# Patient Record
Sex: FEMALE | Race: WHITE | Employment: UNEMPLOYED
[De-identification: names, ages, dates, MRNs, and addresses within clinical notes are randomized per-mention and may not be internally consistent; named-entity substitution may affect disease eponyms.]

---

## 2017-05-17 ENCOUNTER — HOSPITAL ENCOUNTER (OUTPATIENT)
Dept: MAMMOGRAPHY | Facility: CLINIC | Age: 72
Discharge: HOME | End: 2017-05-17
Payer: MEDICARE

## 2017-05-17 DIAGNOSIS — Z12.31 VISIT FOR SCREENING MAMMOGRAM: ICD-10-CM

## 2017-05-17 PROCEDURE — G0202 SCR MAMMO BI INCL CAD: HCPCS

## 2018-06-11 ENCOUNTER — HOSPITAL ENCOUNTER (OUTPATIENT)
Dept: MAMMOGRAPHY | Facility: CLINIC | Age: 73
Discharge: HOME | End: 2018-06-13
Payer: MEDICARE

## 2018-06-11 DIAGNOSIS — Z12.39 SCREENING BREAST EXAMINATION: ICD-10-CM

## 2018-06-11 PROCEDURE — 77067 SCR MAMMO BI INCL CAD: CPT

## 2018-06-13 PROBLEM — R82.991 HYPOCITRATURIA: Status: ACTIVE | Noted: 2018-06-13

## 2020-02-11 ENCOUNTER — APPOINTMENT (RX ONLY)
Dept: URBAN - METROPOLITAN AREA CLINIC 116 | Facility: CLINIC | Age: 75
Setting detail: DERMATOLOGY
End: 2020-02-11

## 2020-02-11 DIAGNOSIS — D69.2 OTHER NONTHROMBOCYTOPENIC PURPURA: ICD-10-CM

## 2020-02-11 DIAGNOSIS — L30.9 DERMATITIS, UNSPECIFIED: ICD-10-CM

## 2020-02-11 DIAGNOSIS — L57.0 ACTINIC KERATOSIS: ICD-10-CM

## 2020-02-11 DIAGNOSIS — L29.89 OTHER PRURITUS: ICD-10-CM

## 2020-02-11 DIAGNOSIS — L85.3 XEROSIS CUTIS: ICD-10-CM

## 2020-02-11 PROBLEM — L29.8 OTHER PRURITUS: Status: ACTIVE | Noted: 2020-02-11

## 2020-02-11 PROCEDURE — 99203 OFFICE O/P NEW LOW 30 MIN: CPT

## 2020-02-11 PROCEDURE — ? PRESCRIPTION

## 2020-02-11 PROCEDURE — ? COUNSELING

## 2020-02-11 PROCEDURE — ? DEFER

## 2020-02-11 PROCEDURE — ? TREATMENT REGIMEN

## 2020-02-11 PROCEDURE — ? MEDICATION COUNSELING

## 2020-02-11 RX ORDER — NYSTATIN 100000 [USP'U]/G
1 POWDER TOPICAL TID
Qty: 1 | Refills: 0 | Status: ERX | COMMUNITY
Start: 2020-02-11

## 2020-02-11 RX ORDER — TRIAMCINOLONE ACETONIDE 1 MG/G
CREAM TOPICAL BID
Qty: 1 | Refills: 0 | Status: ERX | COMMUNITY
Start: 2020-02-11

## 2020-02-11 RX ORDER — EMOLLIENT COMBINATION NO.35
1 CREAM (GRAM) TOPICAL QD
Qty: 1 | Refills: 0 | Status: ERX | COMMUNITY
Start: 2020-02-11

## 2020-02-11 RX ORDER — GENTAMICIN SULFATE 1 MG/G
1 CREAM TOPICAL
Qty: 1 | Refills: 0 | Status: ERX | COMMUNITY
Start: 2020-02-11

## 2020-02-11 RX ADMIN — GENTAMICIN SULFATE 1: 1 CREAM TOPICAL at 00:00

## 2020-02-11 RX ADMIN — TRIAMCINOLONE ACETONIDE: 1 CREAM TOPICAL at 00:00

## 2020-02-11 RX ADMIN — NYSTATIN 1: 100000 POWDER TOPICAL at 00:00

## 2020-02-11 RX ADMIN — Medication 1: at 00:00

## 2020-02-11 ASSESSMENT — LOCATION DETAILED DESCRIPTION DERM
LOCATION DETAILED: LEFT ULNAR DORSAL HAND
LOCATION DETAILED: LEFT PROXIMAL DORSAL FOREARM
LOCATION DETAILED: RIGHT RADIAL DORSAL HAND
LOCATION DETAILED: LEFT RADIAL DORSAL HAND
LOCATION DETAILED: RIGHT PROXIMAL DORSAL FOREARM
LOCATION DETAILED: LEFT ANTERIOR PROXIMAL THIGH

## 2020-02-11 ASSESSMENT — LOCATION SIMPLE DESCRIPTION DERM
LOCATION SIMPLE: LEFT THIGH
LOCATION SIMPLE: LEFT FOREARM
LOCATION SIMPLE: LEFT HAND
LOCATION SIMPLE: RIGHT FOREARM
LOCATION SIMPLE: RIGHT HAND

## 2020-02-11 ASSESSMENT — LOCATION ZONE DERM
LOCATION ZONE: LEG
LOCATION ZONE: HAND
LOCATION ZONE: ARM

## 2020-02-11 NOTE — HPI: RASH
What Type Of Note Output Would You Prefer (Optional)?: Standard Output
Is The Patient Presenting As Previously Scheduled?: Yes
How Severe Is Your Rash?: mild
Is This A New Presentation, Or A Follow-Up?: Rash
Additional History: Patient states that she saw her PCP and they thought it was a fungus.  \\nPatient states the rash started after shegot sick and was prescribed a zpack and antibiotics

## 2020-02-11 NOTE — PROCEDURE: DEFER
Introduction Text (Please End With A Colon): The following procedure was deferred: Liquid Nitrogen
Reason To Defer Override: patient wishes to decline LN2 today
Detail Level: Simple

## 2020-02-11 NOTE — PROCEDURE: MEDICATION COUNSELING
Mirvaso Pregnancy And Lactation Text: This medication has not been assigned a Pregnancy Risk Category. It is unknown if the medication is excreted in breast milk.
Tetracycline Pregnancy And Lactation Text: This medication is Pregnancy Category D and not consider safe during pregnancy. It is also excreted in breast milk.
Otezla Pregnancy And Lactation Text: This medication is Pregnancy Category C and it isn't known if it is safe during pregnancy. It is unknown if it is excreted in breast milk.
Clindamycin Counseling: I counseled the patient regarding use of clindamycin as an antibiotic for prophylactic and/or therapeutic purposes. Clindamycin is active against numerous classes of bacteria, including skin bacteria. Side effects may include nausea, diarrhea, gastrointestinal upset, rash, hives, yeast infections, and in rare cases, colitis.
Zyclara Counseling:  I discussed with the patient the risks of imiquimod including but not limited to erythema, scaling, itching, weeping, crusting, and pain. Patient understands that the inflammatory response to imiquimod is variable from person to person and was educated regarded proper titration schedule. If flu-like symptoms develop, patient knows to discontinue the medication and contact us.
Prednisone Pregnancy And Lactation Text: This medication is Pregnancy Category C and it isn't know if it is safe during pregnancy. This medication is excreted in breast milk.
Finasteride Counseling:  I discussed with the patient the risks of use of finasteride including but not limited to decreased libido, decreased ejaculate volume, gynecomastia, and depression. Women should not handle medication. All of the patient's questions and concerns were addressed.
Valtrex Pregnancy And Lactation Text: this medication is Pregnancy Category B and is considered safe during pregnancy. This medication is not directly found in breast milk but it's metabolite acyclovir is present.
(4) no impairment
Azathioprine Counseling:  I discussed with the patient the risks of azathioprine including but not limited to myelosuppression, immunosuppression, hepatotoxicity, lymphoma, and infections. The patient understands that monitoring is required including baseline LFTs, Creatinine, possible TPMP genotyping and weekly CBCs for the first month and then every 2 weeks thereafter. The patient verbalized understanding of the proper use and possible adverse effects of azathioprine. All of the patient's questions and concerns were addressed.
5-Fu Counseling: 5-Fluorouracil Counseling:  I discussed with the patient the risks of 5-fluorouracil including but not limited to erythema, scaling, itching, weeping, crusting, and pain.
Include Pregnancy/Lactation Warning?: No
Dupixent Counseling: I discussed with the patient the risks of dupilumab including but not limited to eye infection and irritation, cold sores, injection site reactions, worsening of asthma, allergic reactions and increased risk of parasitic infection. Live vaccines should be avoided while taking dupilumab. Dupilumab will also interact with certain medications such as warfarin and cyclosporine. The patient understands that monitoring is required and they must alert us or the primary physician if symptoms of infection or other concerning signs are noted.
5-Fu Pregnancy And Lactation Text: This medication is Pregnancy Category X and contraindicated in pregnancy and in women who may become pregnant. It is unknown if this medication is excreted in breast milk.
Zyclara Pregnancy And Lactation Text: This medication is Pregnancy Category C. It is unknown if this medication is excreted in breast milk.
Picato Counseling:  I discussed with the patient the risks of Picato including but not limited to erythema, scaling, itching, weeping, crusting, and pain.
Simponi Pregnancy And Lactation Text: The risk during pregnancy and breastfeeding is uncertain with this medication.
Oxybutynin Counseling:  I discussed with the patient the risks of oxybutynin including but not limited to skin rash, drowsiness, dry mouth, difficulty urinating, and blurred vision.
Doxepin Pregnancy And Lactation Text: This medication is Pregnancy Category C and it isn't known if it is safe during pregnancy. It is also excreted in breast milk and breast feeding isn't recommended.
Clindamycin Pregnancy And Lactation Text: This medication can be used in pregnancy if certain situations. Clindamycin is also present in breast milk.
Azathioprine Pregnancy And Lactation Text: This medication is Pregnancy Category D and isn't considered safe during pregnancy. It is unknown if this medication is excreted in breast milk.
Skyrizi Counseling: I discussed with the patient the risks of risankizumab-rzaa including but not limited to immunosuppression, and serious infections. The patient understands that monitoring is required including a PPD at baseline and must alert us or the primary physician if symptoms of infection or other concerning signs are noted.
Dupixent Pregnancy And Lactation Text: This medication likely crosses the placenta but the risk for the fetus is uncertain. This medication is excreted in breast milk.
Finasteride Pregnancy And Lactation Text: This medication is absolutely contraindicated during pregnancy. It is unknown if it is excreted in breast milk.
Oxybutynin Pregnancy And Lactation Text: This medication is Pregnancy Category B and is considered safe during pregnancy. It is unknown if it is excreted in breast milk.
Hydroxyzine Counseling: Patient advised that the medication is sedating and not to drive a car after taking this medication. Patient informed of potential adverse effects including but not limited to dry mouth, urinary retention, and blurry vision. The patient verbalized understanding of the proper use and possible adverse effects of hydroxyzine. All of the patient's questions and concerns were addressed.
Fluconazole Counseling:  Patient counseled regarding adverse effects of fluconazole including but not limited to headache, diarrhea, nausea, upset stomach, liver function test abnormalities, taste disturbance, and stomach pain. There is a rare possibility of liver failure that can occur when taking fluconazole. The patient understands that monitoring of LFTs and kidney function test may be required, especially at baseline. The patient verbalized understanding of the proper use and possible adverse effects of fluconazole. All of the patient's questions and concerns were addressed.
Acitretin Counseling:  I discussed with the patient the risks of acitretin including but not limited to hair loss, dry lips/skin/eyes, liver damage, hyperlipidemia, depression/suicidal ideation, photosensitivity. Serious rare side effects can include but are not limited to pancreatitis, pseudotumor cerebri, bony changes, clot formation/stroke/heart attack. Patient understands that alcohol is contraindicated since it can result in liver toxicity and significantly prolong the elimination of the drug by many years.
Enbrel Counseling:  I discussed with the patient the risks of etanercept including but not limited to myelosuppression, immunosuppression, autoimmune hepatitis, demyelinating diseases, lymphoma, and infections. The patient understands that monitoring is required including a PPD at baseline and must alert us or the primary physician if symptoms of infection or other concerning signs are noted.
Gabapentin Counseling: I discussed with the patient the risks of gabapentin including but not limited to dizziness, somnolence, fatigue and ataxia.
Doxycycline Counseling:  Patient counseled regarding possible photosensitivity and increased risk for sunburn. Patient instructed to avoid sunlight, if possible. When exposed to sunlight, patients should wear protective clothing, sunglasses, and sunscreen. The patient was instructed to call the office immediately if the following severe adverse effects occur:  hearing changes, easy bruising/bleeding, severe headache, or vision changes. The patient verbalized understanding of the proper use and possible adverse effects of doxycycline. All of the patient's questions and concerns were addressed.
Drysol Counseling:  I discussed with the patient the risks of drysol/aluminum chloride including but not limited to skin rash, itching, irritation, burning.
Cellcept Counseling:  I discussed with the patient the risks of mycophenolate mofetil including but not limited to infection/immunosuppression, GI upset, hypokalemia, hypercholesterolemia, bone marrow suppression, lymphoproliferative disorders, malignancy, GI ulceration/bleed/perforation, colitis, interstitial lung disease, kidney failure, progressive multifocal leukoencephalopathy, and birth defects. The patient understands that monitoring is required including a baseline creatinine and regular CBC testing. In addition, patient must alert us immediately if symptoms of infection or other concerning signs are noted.
Protopic Counseling: Patient may experience a mild burning sensation during topical application. Protopic is not approved in children less than 3years of age. There have been case reports of hematologic and skin malignancies in patients using topical calcineurin inhibitors although causality is questionable.
Opioid Counseling: I discussed with the patient the potential side effects of opioids including but not limited to addiction, altered mental status, and depression. I stressed avoiding alcohol, benzodiazepines, muscle relaxants and sleep aids unless specifically okayed by a physician. The patient verbalized understanding of the proper use and possible adverse effects of opioids. All of the patient's questions and concerns were addressed. They were instructed to flush the remaining pills down the toilet if they did not need them for pain.
Fluconazole Pregnancy And Lactation Text: This medication is Pregnancy Category C and it isn't know if it is safe during pregnancy. It is also excreted in breast milk.
Propranolol Counseling:  I discussed with the patient the risks of propranolol including but not limited to low heart rate, low blood pressure, low blood sugar, restlessness and increased cold sensitivity. They should call the office if they experience any of these side effects.
Acitretin Pregnancy And Lactation Text: This medication is Pregnancy Category X and should not be given to women who are pregnant or may become pregnant in the future. This medication is excreted in breast milk.
Drysol Pregnancy And Lactation Text: This medication is considered safe during pregnancy and breast feeding.
Hydroxyzine Pregnancy And Lactation Text: This medication is not safe during pregnancy and should not be taken. It is also excreted in breast milk and breast feeding isn't recommended.
Doxycycline Pregnancy And Lactation Text: This medication is Pregnancy Category D and not consider safe during pregnancy. It is also excreted in breast milk but is considered safe for shorter treatment courses.
Enbrel Pregnancy And Lactation Text: This medication is Pregnancy Category B and is considered safe during pregnancy. It is unknown if this medication is excreted in breast milk.
Gabapentin Pregnancy And Lactation Text: This medication is Pregnancy Category C and isn't considered safe during pregnancy. It is excreted in breast milk.
Stelara Counseling:  I discussed with the patient the risks of ustekinumab including but not limited to immunosuppression, malignancy, posterior leukoencephalopathy syndrome, and serious infections. The patient understands that monitoring is required including a PPD at baseline and must alert us or the primary physician if symptoms of infection or other concerning signs are noted.
Griseofulvin Counseling:  I discussed with the patient the risks of griseofulvin including but not limited to photosensitivity, cytopenia, liver damage, nausea/vomiting and severe allergy. The patient understands that this medication is best absorbed when taken with a fatty meal (e.g., ice cream or french fries).
Opioid Pregnancy And Lactation Text: These medications can lead to premature delivery and should be avoided during pregnancy. These medications are also present in breast milk in small amounts.
Bexarotene Counseling:  I discussed with the patient the risks of bexarotene including but not limited to hair loss, dry lips/skin/eyes, liver abnormalities, hyperlipidemia, pancreatitis, depression/suicidal ideation, photosensitivity, drug rash/allergic reactions, hypothyroidism, anemia, leukopenia, infection, cataracts, and teratogenicity. Patient understands that they will need regular blood tests to check lipid profile, liver function tests, white blood cell count, thyroid function tests and pregnancy test if applicable.
Humira Counseling:  I discussed with the patient the risks of adalimumab including but not limited to myelosuppression, immunosuppression, autoimmune hepatitis, demyelinating diseases, lymphoma, and serious infections. The patient understands that monitoring is required including a PPD at baseline and must alert us or the primary physician if symptoms of infection or other concerning signs are noted.
Erythromycin Counseling:  I discussed with the patient the risks of erythromycin including but not limited to GI upset, allergic reaction, drug rash, diarrhea, increase in liver enzymes, and yeast infections.
Glycopyrrolate Counseling:  I discussed with the patient the risks of glycopyrrolate including but not limited to skin rash, drowsiness, dry mouth, difficulty urinating, and blurred vision.
Elidel Counseling: Patient may experience a mild burning sensation during topical application. Elidel is not approved in children less than 3years of age. There have been case reports of hematologic and skin malignancies in patients using topical calcineurin inhibitors although causality is questionable.
Protopic Pregnancy And Lactation Text: This medication is Pregnancy Category C. It is unknown if this medication is excreted in breast milk when applied topically.
Propranolol Pregnancy And Lactation Text: This medication is Pregnancy Category C and it isn't known if it is safe during pregnancy. It is excreted in breast milk.
Cyclophosphamide Counseling:  I discussed with the patient the risks of cyclophosphamide including but not limited to hair loss, hormonal abnormalities, decreased fertility, abdominal pain, diarrhea, nausea and vomiting, bone marrow suppression and infection. The patient understands that monitoring is required while taking this medication.
Albendazole Counseling:  I discussed with the patient the risks of albendazole including but not limited to cytopenia, kidney damage, nausea/vomiting and severe allergy. The patient understands that this medication is being used in an off-label manner.
Birth Control Pills Counseling: Birth Control Pill Counseling: I discussed with the patient the potential side effects of OCPs including but not limited to increased risk of stroke, heart attack, thrombophlebitis, deep venous thrombosis, hepatic adenomas, breast changes, GI upset, headaches, and depression. The patient verbalized understanding of the proper use and possible adverse effects of OCPs. All of the patient's questions and concerns were addressed.
Griseofulvin Pregnancy And Lactation Text: This medication is Pregnancy Category X and is known to cause serious birth defects. It is unknown if this medication is excreted in breast milk but breast feeding should be avoided.
Rhofade Counseling: Rhofade is a topical medication which can decrease superficial blood flow where applied. Side effects are uncommon and include stinging, redness and allergic reactions.
Bexarotene Pregnancy And Lactation Text: This medication is Pregnancy Category X and should not be given to women who are pregnant or may become pregnant. This medication should not be used if you are breast feeding.
Cyclophosphamide Pregnancy And Lactation Text: This medication is Pregnancy Category D and it isn't considered safe during pregnancy. This medication is excreted in breast milk.
Glycopyrrolate Pregnancy And Lactation Text: This medication is Pregnancy Category B and is considered safe during pregnancy. It is unknown if it is excreted breast milk.
Erythromycin Pregnancy And Lactation Text: This medication is Pregnancy Category B and is considered safe during pregnancy. It is also excreted in breast milk.
Isotretinoin Counseling: Patient should get monthly blood tests, not donate blood, not drive at night if vision affected, not share medication, and not undergo elective surgery for 6 months after tx completed. Side effects reviewed, pt to contact office should one occur.
Albendazole Pregnancy And Lactation Text: This medication is Pregnancy Category C and it isn't known if it is safe during pregnancy. It is also excreted in breast milk.
Arava Counseling:  Patient counseled regarding adverse effects of Arava including but not limited to nausea, vomiting, abnormalities in liver function tests. Patients may develop mouth sores, rash, diarrhea, and abnormalities in blood counts. The patient understands that monitoring is required including LFTs and blood counts. There is a rare possibility of scarring of the liver and lung problems that can occur when taking methotrexate. Persistent nausea, loss of appetite, pale stools, dark urine, cough, and shortness of breath should be reported immediately. Patient advised to discontinue Arava treatment and consult with a physician prior to attempting conception. The patient will have to undergo a treatment to eliminate Arava from the body prior to conception.
Taltz Counseling: I discussed with the patient the risks of ixekizumab including but not limited to immunosuppression, serious infections, worsening of inflammatory bowel disease and drug reactions. The patient understands that monitoring is required including a PPD at baseline and must alert us or the primary physician if symptoms of infection or other concerning signs are noted.
Metronidazole Counseling:  I discussed with the patient the risks of metronidazole including but not limited to seizures, nausea/vomiting, a metallic taste in the mouth, nausea/vomiting and severe allergy.
Itraconazole Counseling:  I discussed with the patient the risks of itraconazole including but not limited to liver damage, nausea/vomiting, neuropathy, and severe allergy. The patient understands that this medication is best absorbed when taken with acidic beverages such as non-diet cola or ginger ale. The patient understands that monitoring is required including baseline LFTs and repeat LFTs at intervals. The patient understands that they are to contact us or the primary physician if concerning signs are noted.
Birth Control Pills Pregnancy And Lactation Text: This medication should be avoided if pregnant and for the first 30 days post-partum.
Eucrisa Counseling: Patient may experience a mild burning sensation during topical application. Shellye Jorgito is not approved in children less than 3years of age.
Ilumya Counseling: I discussed with the patient the risks of tildrakizumab including but not limited to immunosuppression, malignancy, posterior leukoencephalopathy syndrome, and serious infections. The patient understands that monitoring is required including a PPD at baseline and must alert us or the primary physician if symptoms of infection or other concerning signs are noted.
Ivermectin Counseling:  Patient instructed to take medication on an empty stomach with a full glass of water. Patient informed of potential adverse effects including but not limited to nausea, diarrhea, dizziness, itching, and swelling of the extremities or lymph nodes. The patient verbalized understanding of the proper use and possible adverse effects of ivermectin. All of the patient's questions and concerns were addressed.
Arava Pregnancy And Lactation Text: This medication is Pregnancy Category X and is absolutely contraindicated during pregnancy. It is unknown if it is excreted in breast milk.
Solaraze Counseling:  I discussed with the patient the risks of Solaraze including but not limited to erythema, scaling, itching, weeping, crusting, and pain.
Cyclosporine Counseling:  I discussed with the patient the risks of cyclosporine including but not limited to hypertension, gingival hyperplasia,myelosuppression, immunosuppression, liver damage, kidney damage, neurotoxicity, lymphoma, and serious infections. The patient understands that monitoring is required including baseline blood pressure, CBC, CMP, lipid panel and uric acid, and then 1-2 times monthly CMP and blood pressure.
Hydroxychloroquine Counseling:  I discussed with the patient that a baseline ophthalmologic exam is needed at the start of therapy and every year thereafter while on therapy. A CBC may also be warranted for monitoring. The side effects of this medication were discussed with the patient, including but not limited to agranulocytosis, aplastic anemia, seizures, rashes, retinopathy, and liver toxicity. Patient instructed to call the office should any adverse effect occur. The patient verbalized understanding of the proper use and possible adverse effects of Plaquenil. All the patient's questions and concerns were addressed.
Hydroxychloroquine Pregnancy And Lactation Text: This medication has been shown to cause fetal harm but it isn't assigned a Pregnancy Risk Category. There are small amounts excreted in breast milk.
Spironolactone Counseling: Patient advised regarding risks of diarrhea, abdominal pain, hyperkalemia, birth defects (for female patients), liver toxicity and renal toxicity. The patient may need blood work to monitor liver and kidney function and potassium levels while on therapy. The patient verbalized understanding of the proper use and possible adverse effects of spironolactone. All of the patient's questions and concerns were addressed.
Eucrisa Pregnancy And Lactation Text: This medication has not been assigned a Pregnancy Risk Category but animal studies failed to show danger with the topical medication. It is unknown if the medication is excreted in breast milk.
Isotretinoin Pregnancy And Lactation Text: This medication is Pregnancy Category X and is considered extremely dangerous during pregnancy. It is unknown if it is excreted in breast milk.
Metronidazole Pregnancy And Lactation Text: This medication is Pregnancy Category B and considered safe during pregnancy. It is also excreted in breast milk.
High Dose Vitamin A Counseling: Side effects reviewed, pt to contact office should one occur.
Clofazimine Counseling:  I discussed with the patient the risks of clofazimine including but not limited to skin and eye pigmentation, liver damage, nausea/vomiting, gastrointestinal bleeding and allergy.
Tremfya Counseling: I discussed with the patient the risks of guselkumab including but not limited to immunosuppression, serious infections, worsening of inflammatory bowel disease and drug reactions. The patient understands that monitoring is required including a PPD at baseline and must alert us or the primary physician if symptoms of infection or other concerning signs are noted.
Minocycline Counseling: Patient advised regarding possible photosensitivity and discoloration of the teeth, skin, lips, tongue and gums. Patient instructed to avoid sunlight, if possible. When exposed to sunlight, patients should wear protective clothing, sunglasses, and sunscreen. The patient was instructed to call the office immediately if the following severe adverse effects occur:  hearing changes, easy bruising/bleeding, severe headache, or vision changes. The patient verbalized understanding of the proper use and possible adverse effects of minocycline. All of the patient's questions and concerns were addressed.
Spironolactone Pregnancy And Lactation Text: This medication can cause feminization of the male fetus and should be avoided during pregnancy. The active metabolite is also found in breast milk.
Ketoconazole Counseling:   Patient counseled regarding improving absorption with orange juice. Adverse effects include but are not limited to breast enlargement, headache, diarrhea, nausea, upset stomach, liver function test abnormalities, taste disturbance, and stomach pain. There is a rare possibility of liver failure that can occur when taking ketoconazole. The patient understands that monitoring of LFTs may be required, especially at baseline. The patient verbalized understanding of the proper use and possible adverse effects of ketoconazole. All of the patient's questions and concerns were addressed.
Solaraze Pregnancy And Lactation Text: This medication is Pregnancy Category B and is considered safe. There is some data to suggest avoiding during the third trimester. It is unknown if this medication is excreted in breast milk.
Infliximab Counseling:  I discussed with the patient the risks of infliximab including but not limited to myelosuppression, immunosuppression, autoimmune hepatitis, demyelinating diseases, lymphoma, and serious infections. The patient understands that monitoring is required including a PPD at baseline and must alert us or the primary physician if symptoms of infection or other concerning signs are noted.
Topical Retinoid counseling:  Patient advised to apply a pea-sized amount only at bedtime and wait 30 minutes after washing their face before applying. If too drying, patient may add a non-comedogenic moisturizer. The patient verbalized understanding of the proper use and possible adverse effects of retinoids. All of the patient's questions and concerns were addressed.
Hydroquinone Counseling:  Patient advised that medication may result in skin irritation, lightening (hypopigmentation), dryness, and burning. In the event of skin irritation, the patient was advised to reduce the amount of the drug applied or use it less frequently. Rarely, spots that are treated with hydroquinone can become darker (pseudoochronosis). Should this occur, patient instructed to stop medication and call the office. The patient verbalized understanding of the proper use and possible adverse effects of hydroquinone. All of the patient's questions and concerns were addressed.
Niacinamide Counseling: I recommended taking niacin or niacinamide, also know as vitamin B3, twice daily. Recent evidence suggests that taking vitamin B3 (500 mg twice daily) can reduce the risk of actinic keratoses and non-melanoma skin cancers. Side effects of vitamin B3 include flushing and headache.
SSKI Counseling:  I discussed with the patient the risks of SSKI including but not limited to thyroid abnormalities, metallic taste, GI upset, fever, headache, acne, arthralgias, paraesthesias, lymphadenopathy, easy bleeding, arrhythmias, and allergic reaction.
Ketoconazole Pregnancy And Lactation Text: This medication is Pregnancy Category C and it isn't know if it is safe during pregnancy. It is also excreted in breast milk and breast feeding isn't recommended.
Azithromycin Counseling:  I discussed with the patient the risks of azithromycin including but not limited to GI upset, allergic reaction, drug rash, diarrhea, and yeast infections.
High Dose Vitamin A Pregnancy And Lactation Text: High dose vitamin A therapy is contraindicated during pregnancy and breast feeding.
Colchicine Counseling:  Patient counseled regarding adverse effects including but not limited to stomach upset (nausea, vomiting, stomach pain, or diarrhea). Patient instructed to limit alcohol consumption while taking this medication. Colchicine may reduce blood counts especially with prolonged use. The patient understands that monitoring of kidney function and blood counts may be required, especially at baseline. The patient verbalized understanding of the proper use and possible adverse effects of colchicine. All of the patient's questions and concerns were addressed.
Niacinamide Pregnancy And Lactation Text: These medications are considered safe during pregnancy.
Quinolones Counseling:  I discussed with the patient the risks of fluoroquinolones including but not limited to GI upset, allergic reaction, drug rash, diarrhea, dizziness, photosensitivity, yeast infections, liver function test abnormalities, tendonitis/tendon rupture.
Terbinafine Counseling: Patient counseling regarding adverse effects of terbinafine including but not limited to headache, diarrhea, rash, upset stomach, liver function test abnormalities, itching, taste/smell disturbance, nausea, abdominal pain, and flatulence. There is a rare possibility of liver failure that can occur when taking terbinafine. The patient understands that a baseline LFT and kidney function test may be required. The patient verbalized understanding of the proper use and possible adverse effects of terbinafine. All of the patient's questions and concerns were addressed.
Sski Pregnancy And Lactation Text: This medication is Pregnancy Category D and isn't considered safe during pregnancy. It is excreted in breast milk.
Rituxan Counseling:  I discussed with the patient the risks of Rituxan infusions. Side effects can include infusion reactions, severe drug rashes including mucocutaneous reactions, reactivation of latent hepatitis and other infections and rarely progressive multifocal leukoencephalopathy. All of the patient's questions and concerns were addressed.
Azithromycin Pregnancy And Lactation Text: This medication is considered safe during pregnancy and is also secreted in breast milk.
Nsaids Counseling: NSAID Counseling: I discussed with the patient that NSAIDs should be taken with food. Prolonged use of NSAIDs can result in the development of stomach ulcers. Patient advised to stop taking NSAIDs if abdominal pain occurs. The patient verbalized understanding of the proper use and possible adverse effects of NSAIDs. All of the patient's questions and concerns were addressed.
Herbert Counseling: Caprice Levy Counseling: I discussed with the patient the risks of Loralyn Olton therapy including increased risk of infection, liver issues, headache, diarrhea, or cold symptoms. Live vaccines should be avoided. They were instructed to call if they have any problems.
Imiquimod Counseling:  I discussed with the patient the risks of imiquimod including but not limited to erythema, scaling, itching, weeping, crusting, and pain. Patient understands that the inflammatory response to imiquimod is variable from person to person and was educated regarded proper titration schedule. If flu-like symptoms develop, patient knows to discontinue the medication and contact us.
Terbinafine Pregnancy And Lactation Text: This medication is Pregnancy Category B and is considered safe during pregnancy. It is also excreted in breast milk and breast feeding isn't recommended.
Tazorac Counseling:  Patient advised that medication is irritating and drying. Patient may need to apply sparingly and wash off after an hour before eventually leaving it on overnight. The patient verbalized understanding of the proper use and possible adverse effects of tazorac. All of the patient's questions and concerns were addressed.
Thalidomide Counseling: I discussed with the patient the risks of thalidomide including but not limited to birth defects, anxiety, weakness, chest pain, dizziness, cough and severe allergy.
Nsaids Pregnancy And Lactation Text: These medications are considered safe up to 30 weeks gestation. It is excreted in breast milk.
Dapsone Counseling: I discussed with the patient the risks of dapsone including but not limited to hemolytic anemia, agranulocytosis, rashes, methemoglobinemia, kidney failure, peripheral neuropathy, headaches, GI upset, and liver toxicity. Patients who start dapsone require monitoring including baseline LFTs and weekly CBCs for the first month, then every month thereafter. The patient verbalized understanding of the proper use and possible adverse effects of dapsone. All of the patient's questions and concerns were addressed.
Xelsamsonz Pregnancy And Lactation Text: This medication is Pregnancy Category D and is not considered safe during pregnancy. The risk during breast feeding is also uncertain.
Benzoyl Peroxide Counseling: Patient counseled that medicine may cause skin irritation and bleach clothing. In the event of skin irritation, the patient was advised to reduce the amount of the drug applied or use it less frequently. The patient verbalized understanding of the proper use and possible adverse effects of benzoyl peroxide. All of the patient's questions and concerns were addressed.
Topical Sulfur Applications Counseling: Topical Sulfur Counseling: Patient counseled that this medication may cause skin irritation or allergic reactions. In the event of skin irritation, the patient was advised to reduce the amount of the drug applied or use it less frequently. The patient verbalized understanding of the proper use and possible adverse effects of topical sulfur application. All of the patient's questions and concerns were addressed.
Bactrim Counseling:  I discussed with the patient the risks of sulfa antibiotics including but not limited to GI upset, allergic reaction, drug rash, diarrhea, dizziness, photosensitivity, and yeast infections. Rarely, more serious reactions can occur including but not limited to aplastic anemia, agranulocytosis, methemoglobinemia, blood dyscrasias, liver or kidney failure, lung infiltrates or desquamative/blistering drug rashes.
Cimzia Counseling:  I discussed with the patient the risks of Cimzia including but not limited to immunosuppression, allergic reactions and infections. The patient understands that monitoring is required including a PPD at baseline and must alert us or the primary physician if symptoms of infection or other concerning signs are noted.
Rituxan Pregnancy And Lactation Text: This medication is Pregnancy Category C and it isn't know if it is safe during pregnancy. It is unknown if this medication is excreted in breast milk but similar antibodies are known to be excreted.
Tazorac Pregnancy And Lactation Text: This medication is not safe during pregnancy. It is unknown if this medication is excreted in breast milk.
Xolair Counseling:  Patient informed of potential adverse effects including but not limited to fever, muscle aches, rash and allergic reactions. The patient verbalized understanding of the proper use and possible adverse effects of Xolair. All of the patient's questions and concerns were addressed.
Bactrim Pregnancy And Lactation Text: This medication is Pregnancy Category D and is known to cause fetal risk. It is also excreted in breast milk.
Methotrexate Counseling:  Patient counseled regarding adverse effects of methotrexate including but not limited to nausea, vomiting, abnormalities in liver function tests. Patients may develop mouth sores, rash, diarrhea, and abnormalities in blood counts. The patient understands that monitoring is required including LFT's and blood counts. There is a rare possibility of scarring of the liver and lung problems that can occur when taking methotrexate. Persistent nausea, loss of appetite, pale stools, dark urine, cough, and shortness of breath should be reported immediately. Patient advised to discontinue methotrexate treatment at least three months before attempting to become pregnant. I discussed the need for folate supplements while taking methotrexate. These supplements can decrease side effects during methotrexate treatment. The patient verbalized understanding of the proper use and possible adverse effects of methotrexate. All of the patient's questions and concerns were addressed.
Benzoyl Peroxide Pregnancy And Lactation Text: This medication is Pregnancy Category C. It is unknown if benzoyl peroxide is excreted in breast milk.
Rifampin Counseling: I discussed with the patient the risks of rifampin including but not limited to liver damage, kidney damage, red-orange body fluids, nausea/vomiting and severe allergy.
Siliq Counseling:  I discussed with the patient the risks of Siliq including but not limited to new or worsening depression, suicidal thoughts and behavior, immunosuppression, malignancy, posterior leukoencephalopathy syndrome, and serious infections. The patient understands that monitoring is required including a PPD at baseline and must alert us or the primary physician if symptoms of infection or other concerning signs are noted. There is also a special program designed to monitor depression which is required with Siliq.
Cimzia Pregnancy And Lactation Text: This medication crosses the placenta but can be considered safe in certain situations. Cimzia may be excreted in breast milk.
Minoxidil Counseling: Minoxidil is a topical medication which can increase blood flow where it is applied. It is uncertain how this medication increases hair growth. Side effects are uncommon and include stinging and allergic reactions.
Topical Sulfur Applications Pregnancy And Lactation Text: This medication is Pregnancy Category C and has an unknown safety profile during pregnancy. It is unknown if this topical medication is excreted in breast milk.
Odomzo Counseling- I discussed with the patient the risks of Odomzo including but not limited to nausea, vomiting, diarrhea, constipation, weight loss, changes in the sense of taste, decreased appetite, muscle spasms, and hair loss. The patient verbalized understanding of the proper use and possible adverse effects of Odomzo. All of the patient's questions and concerns were addressed.
Tranexamic Acid Counseling:  Patient advised of the small risk of bleeding problems with tranexamic acid. They were also instructed to call if they developed any nausea, vomiting or diarrhea. All of the patient's questions and concerns were addressed.
Topical Clindamycin Counseling: Patient counseled that this medication may cause skin irritation or allergic reactions. In the event of skin irritation, the patient was advised to reduce the amount of the drug applied or use it less frequently. The patient verbalized understanding of the proper use and possible adverse effects of clindamycin. All of the patient's questions and concerns were addressed.
Cimetidine Counseling:  I discussed with the patient the risks of Cimetidine including but not limited to gynecomastia, headache, diarrhea, nausea, drowsiness, arrhythmias, pancreatitis, skin rashes, psychosis, bone marrow suppression and kidney toxicity.
Dapsone Pregnancy And Lactation Text: This medication is Pregnancy Category C and is not considered safe during pregnancy or breast feeding.
Rifampin Pregnancy And Lactation Text: This medication is Pregnancy Category C and it isn't know if it is safe during pregnancy. It is also excreted in breast milk and should not be used if you are breast feeding.
Detail Level: Simple
Carac Counseling:  I discussed with the patient the risks of Carac including but not limited to erythema, scaling, itching, weeping, crusting, and pain.
Cephalexin Counseling: I counseled the patient regarding use of cephalexin as an antibiotic for prophylactic and/or therapeutic purposes. Cephalexin (commonly prescribed under brand name Keflex) is a cephalosporin antibiotic which is active against numerous classes of bacteria, including most skin bacteria. Side effects may include nausea, diarrhea, gastrointestinal upset, rash, hives, yeast infections, and in rare cases, hepatitis, kidney disease, seizures, fever, confusion, neurologic symptoms, and others. Patients with severe allergies to penicillin medications are cautioned that there is about a 10% incidence of cross-reactivity with cephalosporins. When possible, patients with penicillin allergies should use alternatives to cephalosporins for antibiotic therapy.
Methotrexate Pregnancy And Lactation Text: This medication is Pregnancy Category X and is known to cause fetal harm. This medication is excreted in breast milk.
Wartpeel Counseling:  I discussed with the patient the risks of Wartpeel including but not limited to erythema, scaling, itching, weeping, crusting, and pain.
Erivedge Counseling- I discussed with the patient the risks of Erivedge including but not limited to nausea, vomiting, diarrhea, constipation, weight loss, changes in the sense of taste, decreased appetite, muscle spasms, and hair loss. The patient verbalized understanding of the proper use and possible adverse effects of Erivedge. All of the patient's questions and concerns were addressed.
Xolair Pregnancy And Lactation Text: This medication is Pregnancy Category B and is considered safe during pregnancy. This medication is excreted in breast milk.
Tranexamic Acid Pregnancy And Lactation Text: It is unknown if this medication is safe during pregnancy or breast feeding.
Cosentyx Counseling:  I discussed with the patient the risks of Cosentyx including but not limited to worsening of Crohn's disease, immunosuppression, allergic reactions and infections. The patient understands that monitoring is required including a PPD at baseline and must alert us or the primary physician if symptoms of infection or other concerning signs are noted.
Prednisone Counseling:  I discussed with the patient the risks of prolonged use of prednisone including but not limited to weight gain, insomnia, osteoporosis, mood changes, diabetes, susceptibility to infection, glaucoma and high blood pressure. In cases where prednisone use is prolonged, patients should be monitored with blood pressure checks, serum glucose levels and an eye exam.  Additionally, the patient may need to be placed on GI prophylaxis, PCP prophylaxis, and calcium and vitamin D supplementation and/or a bisphosphonate. The patient verbalized understanding of the proper use and the possible adverse effects of prednisone. All of the patient's questions and concerns were addressed.
Mirvaso Counseling: Shivam Lety is a topical medication which can decrease superficial blood flow where applied. Side effects are uncommon and include stinging, redness and allergic reactions.
Otezla Counseling: Lunette Hailey Counseling: The side effects of Lunette Hailey were discussed with the patient, including but not limited to worsening or new depression, weight loss, diarrhea, nausea, upper respiratory tract infection, and headache. Patient instructed to call the office should any adverse effect occur. The patient verbalized understanding of the proper use and possible adverse effects of Otezla. All the patient's questions and concerns were addressed.
Tetracycline Counseling: Patient counseled regarding possible photosensitivity and increased risk for sunburn. Patient instructed to avoid sunlight, if possible. When exposed to sunlight, patients should wear protective clothing, sunglasses, and sunscreen. The patient was instructed to call the office immediately if the following severe adverse effects occur:  hearing changes, easy bruising/bleeding, severe headache, or vision changes. The patient verbalized understanding of the proper use and possible adverse effects of tetracycline. All of the patient's questions and concerns were addressed. Patient understands to avoid pregnancy while on therapy due to potential birth defects.
Cephalexin Pregnancy And Lactation Text: This medication is Pregnancy Category B and considered safe during pregnancy. It is also excreted in breast milk but can be used safely for shorter doses.
Valtrex Counseling: I discussed with the patient the risks of valacyclovir including but not limited to kidney damage, nausea, vomiting and severe allergy. The patient understands that if the infection seems to be worsening or is not improving, they are to call.
Simponi Counseling:  I discussed with the patient the risks of golimumab including but not limited to myelosuppression, immunosuppression, autoimmune hepatitis, demyelinating diseases, lymphoma, and serious infections. The patient understands that monitoring is required including a PPD at baseline and must alert us or the primary physician if symptoms of infection or other concerning signs are noted.
Doxepin Counseling:  Patient advised that the medication is sedating and not to drive a car after taking this medication. Patient informed of potential adverse effects including but not limited to dry mouth, urinary retention, and blurry vision. The patient verbalized understanding of the proper use and possible adverse effects of doxepin. All of the patient's questions and concerns were addressed.

## 2020-02-11 NOTE — PROCEDURE: TREATMENT REGIMEN
Samples Given: Mimyx
Detail Level: Zone
Initiate Treatment: Nystatin powder TID\\nGentamicin cream BID wait until dry then apply Triamcinolone cream bid

## 2020-03-03 ENCOUNTER — APPOINTMENT (RX ONLY)
Dept: URBAN - METROPOLITAN AREA CLINIC 116 | Facility: CLINIC | Age: 75
Setting detail: DERMATOLOGY
End: 2020-03-03

## 2020-03-03 DIAGNOSIS — D69.2 OTHER NONTHROMBOCYTOPENIC PURPURA: ICD-10-CM

## 2020-03-03 DIAGNOSIS — L81.4 OTHER MELANIN HYPERPIGMENTATION: ICD-10-CM

## 2020-03-03 DIAGNOSIS — L30.9 DERMATITIS, UNSPECIFIED: ICD-10-CM | Status: RESOLVING

## 2020-03-03 DIAGNOSIS — D22 MELANOCYTIC NEVI: ICD-10-CM

## 2020-03-03 DIAGNOSIS — L85.3 XEROSIS CUTIS: ICD-10-CM

## 2020-03-03 DIAGNOSIS — L57.0 ACTINIC KERATOSIS: ICD-10-CM

## 2020-03-03 PROBLEM — D22.62 MELANOCYTIC NEVI OF LEFT UPPER LIMB, INCLUDING SHOULDER: Status: ACTIVE | Noted: 2020-03-03

## 2020-03-03 PROBLEM — D22.61 MELANOCYTIC NEVI OF RIGHT UPPER LIMB, INCLUDING SHOULDER: Status: ACTIVE | Noted: 2020-03-03

## 2020-03-03 PROCEDURE — 17003 DESTRUCT PREMALG LES 2-14: CPT

## 2020-03-03 PROCEDURE — 99214 OFFICE O/P EST MOD 30 MIN: CPT | Mod: 25

## 2020-03-03 PROCEDURE — ? LIQUID NITROGEN

## 2020-03-03 PROCEDURE — ? COUNSELING

## 2020-03-03 PROCEDURE — 17000 DESTRUCT PREMALG LESION: CPT

## 2020-03-03 ASSESSMENT — LOCATION SIMPLE DESCRIPTION DERM
LOCATION SIMPLE: LEFT WRIST
LOCATION SIMPLE: LEFT FOREARM
LOCATION SIMPLE: LEFT HAND
LOCATION SIMPLE: RIGHT FOREARM
LOCATION SIMPLE: LEFT THIGH
LOCATION SIMPLE: RIGHT HAND

## 2020-03-03 ASSESSMENT — LOCATION DETAILED DESCRIPTION DERM
LOCATION DETAILED: LEFT VENTRAL PROXIMAL FOREARM
LOCATION DETAILED: LEFT RADIAL DORSAL HAND
LOCATION DETAILED: RIGHT RADIAL DORSAL HAND
LOCATION DETAILED: LEFT ANTERIOR PROXIMAL THIGH
LOCATION DETAILED: LEFT VENTRAL DISTAL FOREARM
LOCATION DETAILED: LEFT MEDIAL DORSAL WRIST
LOCATION DETAILED: LEFT ULNAR DORSAL HAND
LOCATION DETAILED: RIGHT VENTRAL DISTAL FOREARM
LOCATION DETAILED: RIGHT VENTRAL PROXIMAL FOREARM

## 2020-03-03 ASSESSMENT — LOCATION ZONE DERM
LOCATION ZONE: HAND
LOCATION ZONE: ARM
LOCATION ZONE: LEG

## 2020-03-03 NOTE — PROCEDURE: LIQUID NITROGEN
Post-Care Instructions: I reviewed with the patient in detail post-care instructions. Patient is to wear sunprotection, and avoid picking at any of the treated lesions. Pt may apply Vaseline to crusted or scabbing areas.
Render Note In Bullet Format When Appropriate: No
Number Of Freeze-Thaw Cycles: 2 freeze-thaw cycles
Detail Level: Simple
Consent: The patient's consent was obtained including but not limited to risks of crusting, scabbing, blistering, scarring, darker or lighter pigmentary change, recurrence, incomplete removal and infection.
Duration Of Freeze Thaw-Cycle (Seconds): 3

## 2021-04-23 ENCOUNTER — IMMUNIZATION (OUTPATIENT)
Dept: FAMILY MEDICINE CLINIC | Age: 76
End: 2021-04-23
Payer: MEDICARE

## 2021-04-23 PROCEDURE — 91300 COVID-19, PFIZER VACCINE 30MCG/0.3ML DOSE: CPT | Performed by: INTERNAL MEDICINE

## 2021-04-23 PROCEDURE — 0001A COVID-19, PFIZER VACCINE 30MCG/0.3ML DOSE: CPT | Performed by: INTERNAL MEDICINE

## 2021-05-14 ENCOUNTER — IMMUNIZATION (OUTPATIENT)
Dept: FAMILY MEDICINE CLINIC | Age: 76
End: 2021-05-14
Payer: MEDICARE

## 2021-05-14 PROCEDURE — 91300 COVID-19, PFIZER VACCINE 30MCG/0.3ML DOSE: CPT | Performed by: INTERNAL MEDICINE

## 2021-05-14 PROCEDURE — 0002A COVID-19, PFIZER VACCINE 30MCG/0.3ML DOSE: CPT | Performed by: INTERNAL MEDICINE

## 2021-08-04 RX ORDER — SODIUM CHLORIDE, SODIUM LACTATE, POTASSIUM CHLORIDE, CALCIUM CHLORIDE 600; 310; 30; 20 MG/100ML; MG/100ML; MG/100ML; MG/100ML
1000 INJECTION, SOLUTION INTRAVENOUS CONTINUOUS
Status: CANCELLED | OUTPATIENT
Start: 2021-08-04

## 2021-08-06 ENCOUNTER — HOSPITAL ENCOUNTER (OUTPATIENT)
Dept: PREADMISSION TESTING | Age: 76
Discharge: HOME | End: 2021-08-10
Payer: MEDICARE

## 2021-08-06 VITALS
HEIGHT: 65 IN | DIASTOLIC BLOOD PRESSURE: 57 MMHG | RESPIRATION RATE: 16 BRPM | OXYGEN SATURATION: 97 % | TEMPERATURE: 96.8 F | WEIGHT: 272 LBS | SYSTOLIC BLOOD PRESSURE: 158 MMHG | BODY MASS INDEX: 45.32 KG/M2 | HEART RATE: 55 BPM

## 2021-08-06 LAB
ANION GAP SERPL CALCULATED.3IONS-SCNC: 12 MMOL/L (ref 9–17)
BUN BLDV-MCNC: 25 MG/DL (ref 8–23)
CHLORIDE BLD-SCNC: 106 MMOL/L (ref 98–107)
CO2: 25 MMOL/L (ref 20–31)
CREAT SERPL-MCNC: 0.92 MG/DL (ref 0.5–0.9)
GFR AFRICAN AMERICAN: >60 ML/MIN
GFR NON-AFRICAN AMERICAN: 60 ML/MIN
GFR SERPL CREATININE-BSD FRML MDRD: ABNORMAL ML/MIN/{1.73_M2}
GFR SERPL CREATININE-BSD FRML MDRD: ABNORMAL ML/MIN/{1.73_M2}
GLUCOSE BLD-MCNC: 105 MG/DL (ref 70–99)
POTASSIUM SERPL-SCNC: 5.5 MMOL/L (ref 3.7–5.3)
SODIUM BLD-SCNC: 143 MMOL/L (ref 135–144)

## 2021-08-06 PROCEDURE — 84520 ASSAY OF UREA NITROGEN: CPT

## 2021-08-06 PROCEDURE — 82947 ASSAY GLUCOSE BLOOD QUANT: CPT

## 2021-08-06 PROCEDURE — 93005 ELECTROCARDIOGRAM TRACING: CPT | Performed by: ANESTHESIOLOGY

## 2021-08-06 PROCEDURE — 82565 ASSAY OF CREATININE: CPT

## 2021-08-06 PROCEDURE — 80051 ELECTROLYTE PANEL: CPT

## 2021-08-06 PROCEDURE — 36415 COLL VENOUS BLD VENIPUNCTURE: CPT

## 2021-08-06 RX ORDER — HYDROCODONE BITARTRATE AND ACETAMINOPHEN 5; 325 MG/1; MG/1
TABLET ORAL
Status: ON HOLD | COMMUNITY
Start: 2021-07-14 | End: 2021-08-17 | Stop reason: HOSPADM

## 2021-08-06 RX ORDER — ONDANSETRON 8 MG/1
TABLET, ORALLY DISINTEGRATING ORAL
COMMUNITY
Start: 2021-07-14 | End: 2021-09-29

## 2021-08-06 RX ORDER — CLOTRIMAZOLE AND BETAMETHASONE DIPROPIONATE 10; .5 MG/ML; MG/ML
LOTION TOPICAL PRN
COMMUNITY
Start: 2021-08-02 | End: 2021-09-29

## 2021-08-06 RX ORDER — TRIAMCINOLONE ACETONIDE 0.25 MG/G
CREAM TOPICAL PRN
COMMUNITY
End: 2021-09-29

## 2021-08-06 RX ORDER — METOPROLOL TARTRATE 100 MG/1
100 TABLET ORAL 2 TIMES DAILY
COMMUNITY
Start: 2021-08-02

## 2021-08-06 RX ORDER — LEVOTHYROXINE SODIUM 137 MCG
137 TABLET ORAL DAILY
COMMUNITY
Start: 2021-08-02

## 2021-08-06 RX ORDER — CLINDAMYCIN PHOSPHATE 900 MG/50ML
900 INJECTION INTRAVENOUS ONCE
Status: CANCELLED | OUTPATIENT
Start: 2021-08-06 | End: 2021-08-06

## 2021-08-06 RX ORDER — TRAMADOL HYDROCHLORIDE 50 MG/1
50 TABLET ORAL EVERY 6 HOURS PRN
Status: ON HOLD | COMMUNITY
End: 2021-08-17 | Stop reason: HOSPADM

## 2021-08-06 RX ORDER — GENTAMICIN SULFATE 1 MG/G
CREAM TOPICAL 2 TIMES DAILY PRN
COMMUNITY

## 2021-08-06 RX ORDER — FERROUS SULFATE 325(65) MG
325 TABLET ORAL DAILY
COMMUNITY
Start: 2021-08-02

## 2021-08-06 NOTE — PROGRESS NOTES
Yes    I spoke with Dr. Xiang Sosa. Patient history and pertinent findings reviewed. Patient with history of palpitations and MVP, follows with Dr. Jorge Alberto Plasencia cardiologist twice annually. Most recent cardiac echo with EF = 55%. Today's PAT EKG is sinus oscar. Medical or cardiac clearance ordered: no, obtained most recent cardiology notes and testing results.      Herb Cockayne, APRN - CNP   8/6/21  2:13 PM

## 2021-08-08 LAB
EKG ATRIAL RATE: 55 BPM
EKG P-R INTERVAL: 172 MS
EKG Q-T INTERVAL: 438 MS
EKG QRS DURATION: 76 MS
EKG QTC CALCULATION (BAZETT): 419 MS
EKG T AXIS: 37 DEGREES
EKG VENTRICULAR RATE: 55 BPM

## 2021-08-08 PROCEDURE — 93010 ELECTROCARDIOGRAM REPORT: CPT | Performed by: INTERNAL MEDICINE

## 2021-08-16 ENCOUNTER — ANESTHESIA EVENT (OUTPATIENT)
Dept: OPERATING ROOM | Age: 76
End: 2021-08-16
Payer: MEDICARE

## 2021-08-17 ENCOUNTER — HOSPITAL ENCOUNTER (OUTPATIENT)
Age: 76
Setting detail: OUTPATIENT SURGERY
Discharge: HOME | End: 2021-08-17
Attending: SPECIALIST | Admitting: SPECIALIST
Payer: MEDICARE

## 2021-08-17 ENCOUNTER — ANESTHESIA (OUTPATIENT)
Dept: OPERATING ROOM | Age: 76
End: 2021-08-17
Payer: MEDICARE

## 2021-08-17 ENCOUNTER — APPOINTMENT (OUTPATIENT)
Dept: GENERAL RADIOLOGY | Age: 76
End: 2021-08-17
Attending: SPECIALIST
Payer: MEDICARE

## 2021-08-17 VITALS
OXYGEN SATURATION: 98 % | SYSTOLIC BLOOD PRESSURE: 126 MMHG | WEIGHT: 270 LBS | RESPIRATION RATE: 17 BRPM | TEMPERATURE: 96.8 F | HEART RATE: 55 BPM | HEIGHT: 65 IN | BODY MASS INDEX: 44.98 KG/M2 | DIASTOLIC BLOOD PRESSURE: 62 MMHG

## 2021-08-17 VITALS — SYSTOLIC BLOOD PRESSURE: 141 MMHG | OXYGEN SATURATION: 99 % | DIASTOLIC BLOOD PRESSURE: 115 MMHG

## 2021-08-17 DIAGNOSIS — G89.18 ACUTE POSTOPERATIVE PAIN: Primary | ICD-10-CM

## 2021-08-17 LAB
GLUCOSE BLD-MCNC: 117 MG/DL (ref 65–105)
GLUCOSE BLD-MCNC: 155 MG/DL (ref 74–100)
POC POTASSIUM: 4.6 MMOL/L (ref 3.5–4.5)

## 2021-08-17 PROCEDURE — C2617 STENT, NON-COR, TEM W/O DEL: HCPCS | Performed by: SPECIALIST

## 2021-08-17 PROCEDURE — 6360000002 HC RX W HCPCS: Performed by: SPECIALIST

## 2021-08-17 PROCEDURE — 2709999900 HC NON-CHARGEABLE SUPPLY: Performed by: SPECIALIST

## 2021-08-17 PROCEDURE — C1769 GUIDE WIRE: HCPCS | Performed by: SPECIALIST

## 2021-08-17 PROCEDURE — 2580000003 HC RX 258: Performed by: ANESTHESIOLOGY

## 2021-08-17 PROCEDURE — 3700000001 HC ADD 15 MINUTES (ANESTHESIA): Performed by: SPECIALIST

## 2021-08-17 PROCEDURE — 3700000000 HC ANESTHESIA ATTENDED CARE: Performed by: SPECIALIST

## 2021-08-17 PROCEDURE — 6370000000 HC RX 637 (ALT 250 FOR IP): Performed by: ANESTHESIOLOGY

## 2021-08-17 PROCEDURE — 3209999900 FLUORO FOR SURGICAL PROCEDURES

## 2021-08-17 PROCEDURE — 2580000003 HC RX 258: Performed by: SPECIALIST

## 2021-08-17 PROCEDURE — 3600000014 HC SURGERY LEVEL 4 ADDTL 15MIN: Performed by: SPECIALIST

## 2021-08-17 PROCEDURE — 6360000002 HC RX W HCPCS: Performed by: ANESTHESIOLOGY

## 2021-08-17 PROCEDURE — 84132 ASSAY OF SERUM POTASSIUM: CPT

## 2021-08-17 PROCEDURE — 7100000040 HC SPAR PHASE II RECOVERY - FIRST 15 MIN: Performed by: SPECIALIST

## 2021-08-17 PROCEDURE — 7100000041 HC SPAR PHASE II RECOVERY - ADDTL 15 MIN: Performed by: SPECIALIST

## 2021-08-17 PROCEDURE — 6370000000 HC RX 637 (ALT 250 FOR IP): Performed by: SPECIALIST

## 2021-08-17 PROCEDURE — 7100000001 HC PACU RECOVERY - ADDTL 15 MIN: Performed by: SPECIALIST

## 2021-08-17 PROCEDURE — 3600000004 HC SURGERY LEVEL 4 BASE: Performed by: SPECIALIST

## 2021-08-17 PROCEDURE — C1894 INTRO/SHEATH, NON-LASER: HCPCS | Performed by: SPECIALIST

## 2021-08-17 PROCEDURE — 2500000003 HC RX 250 WO HCPCS: Performed by: ANESTHESIOLOGY

## 2021-08-17 PROCEDURE — 2720000010 HC SURG SUPPLY STERILE: Performed by: SPECIALIST

## 2021-08-17 PROCEDURE — 82947 ASSAY GLUCOSE BLOOD QUANT: CPT

## 2021-08-17 PROCEDURE — 87086 URINE CULTURE/COLONY COUNT: CPT

## 2021-08-17 PROCEDURE — 7100000000 HC PACU RECOVERY - FIRST 15 MIN: Performed by: SPECIALIST

## 2021-08-17 PROCEDURE — 6360000002 HC RX W HCPCS: Performed by: STUDENT IN AN ORGANIZED HEALTH CARE EDUCATION/TRAINING PROGRAM

## 2021-08-17 DEVICE — URETERAL STENT
Type: IMPLANTABLE DEVICE | Site: URETER | Status: FUNCTIONAL
Brand: CONTOUR™

## 2021-08-17 RX ORDER — HYDROCODONE BITARTRATE AND ACETAMINOPHEN 5; 325 MG/1; MG/1
1 TABLET ORAL ONCE
Status: COMPLETED | OUTPATIENT
Start: 2021-08-17 | End: 2021-08-17

## 2021-08-17 RX ORDER — HYDROCODONE BITARTRATE AND ACETAMINOPHEN 5; 325 MG/1; MG/1
TABLET ORAL
Status: DISCONTINUED
Start: 2021-08-17 | End: 2021-08-17 | Stop reason: HOSPADM

## 2021-08-17 RX ORDER — CLINDAMYCIN PHOSPHATE 900 MG/50ML
900 INJECTION INTRAVENOUS ONCE
Status: DISCONTINUED | OUTPATIENT
Start: 2021-08-17 | End: 2021-08-17

## 2021-08-17 RX ORDER — MAGNESIUM HYDROXIDE 1200 MG/15ML
LIQUID ORAL CONTINUOUS PRN
Status: COMPLETED | OUTPATIENT
Start: 2021-08-17 | End: 2021-08-17

## 2021-08-17 RX ORDER — FENTANYL CITRATE 50 UG/ML
50 INJECTION, SOLUTION INTRAMUSCULAR; INTRAVENOUS EVERY 5 MIN PRN
Status: DISCONTINUED | OUTPATIENT
Start: 2021-08-17 | End: 2021-08-17 | Stop reason: HOSPADM

## 2021-08-17 RX ORDER — ONDANSETRON 2 MG/ML
4 INJECTION INTRAMUSCULAR; INTRAVENOUS
Status: DISCONTINUED | OUTPATIENT
Start: 2021-08-17 | End: 2021-08-17 | Stop reason: HOSPADM

## 2021-08-17 RX ORDER — 0.9 % SODIUM CHLORIDE 0.9 %
500 INTRAVENOUS SOLUTION INTRAVENOUS
Status: DISCONTINUED | OUTPATIENT
Start: 2021-08-17 | End: 2021-08-17 | Stop reason: HOSPADM

## 2021-08-17 RX ORDER — SCOLOPAMINE TRANSDERMAL SYSTEM 1 MG/1
1 PATCH, EXTENDED RELEASE TRANSDERMAL ONCE
Status: DISCONTINUED | OUTPATIENT
Start: 2021-08-17 | End: 2021-08-17

## 2021-08-17 RX ORDER — PROPOFOL 10 MG/ML
INJECTION, EMULSION INTRAVENOUS PRN
Status: DISCONTINUED | OUTPATIENT
Start: 2021-08-17 | End: 2021-08-17 | Stop reason: SDUPTHER

## 2021-08-17 RX ORDER — PROMETHAZINE HYDROCHLORIDE 25 MG/ML
6.25 INJECTION, SOLUTION INTRAMUSCULAR; INTRAVENOUS
Status: DISCONTINUED | OUTPATIENT
Start: 2021-08-17 | End: 2021-08-17 | Stop reason: HOSPADM

## 2021-08-17 RX ORDER — OXYBUTYNIN CHLORIDE 10 MG/1
10 TABLET, EXTENDED RELEASE ORAL ONCE
Status: COMPLETED | OUTPATIENT
Start: 2021-08-17 | End: 2021-08-17

## 2021-08-17 RX ORDER — LABETALOL HYDROCHLORIDE 5 MG/ML
5 INJECTION, SOLUTION INTRAVENOUS EVERY 10 MIN PRN
Status: DISCONTINUED | OUTPATIENT
Start: 2021-08-17 | End: 2021-08-17 | Stop reason: HOSPADM

## 2021-08-17 RX ORDER — OXYBUTYNIN CHLORIDE 10 MG/1
TABLET, EXTENDED RELEASE ORAL
Status: DISCONTINUED
Start: 2021-08-17 | End: 2021-08-17 | Stop reason: HOSPADM

## 2021-08-17 RX ORDER — FENTANYL CITRATE 50 UG/ML
INJECTION, SOLUTION INTRAMUSCULAR; INTRAVENOUS PRN
Status: DISCONTINUED | OUTPATIENT
Start: 2021-08-17 | End: 2021-08-17 | Stop reason: SDUPTHER

## 2021-08-17 RX ORDER — MIDAZOLAM HYDROCHLORIDE 2 MG/2ML
1 INJECTION, SOLUTION INTRAMUSCULAR; INTRAVENOUS EVERY 10 MIN PRN
Status: DISCONTINUED | OUTPATIENT
Start: 2021-08-17 | End: 2021-08-17 | Stop reason: HOSPADM

## 2021-08-17 RX ORDER — TAMSULOSIN HYDROCHLORIDE 0.4 MG/1
0.4 CAPSULE ORAL DAILY
Qty: 7 CAPSULE | Refills: 1 | Status: SHIPPED | OUTPATIENT
Start: 2021-08-17 | End: 2021-09-29

## 2021-08-17 RX ORDER — ONDANSETRON 2 MG/ML
INJECTION INTRAMUSCULAR; INTRAVENOUS PRN
Status: DISCONTINUED | OUTPATIENT
Start: 2021-08-17 | End: 2021-08-17 | Stop reason: SDUPTHER

## 2021-08-17 RX ORDER — OXYBUTYNIN CHLORIDE 10 MG/1
10 TABLET, EXTENDED RELEASE ORAL DAILY
Qty: 7 TABLET | Refills: 1 | Status: SHIPPED | OUTPATIENT
Start: 2021-08-17 | End: 2021-09-29

## 2021-08-17 RX ORDER — FENTANYL CITRATE 50 UG/ML
25 INJECTION, SOLUTION INTRAMUSCULAR; INTRAVENOUS EVERY 5 MIN PRN
Status: DISCONTINUED | OUTPATIENT
Start: 2021-08-17 | End: 2021-08-17 | Stop reason: HOSPADM

## 2021-08-17 RX ORDER — LIDOCAINE HYDROCHLORIDE 10 MG/ML
1 INJECTION, SOLUTION EPIDURAL; INFILTRATION; INTRACAUDAL; PERINEURAL
Status: COMPLETED | OUTPATIENT
Start: 2021-08-17 | End: 2021-08-17

## 2021-08-17 RX ORDER — SODIUM CHLORIDE, SODIUM LACTATE, POTASSIUM CHLORIDE, CALCIUM CHLORIDE 600; 310; 30; 20 MG/100ML; MG/100ML; MG/100ML; MG/100ML
1000 INJECTION, SOLUTION INTRAVENOUS CONTINUOUS
Status: DISCONTINUED | OUTPATIENT
Start: 2021-08-17 | End: 2021-08-17 | Stop reason: HOSPADM

## 2021-08-17 RX ORDER — HYDRALAZINE HYDROCHLORIDE 20 MG/ML
5 INJECTION INTRAMUSCULAR; INTRAVENOUS EVERY 10 MIN PRN
Status: DISCONTINUED | OUTPATIENT
Start: 2021-08-17 | End: 2021-08-17 | Stop reason: HOSPADM

## 2021-08-17 RX ORDER — HYDROCODONE BITARTRATE AND ACETAMINOPHEN 5; 325 MG/1; MG/1
1 TABLET ORAL EVERY 6 HOURS PRN
Qty: 4 TABLET | Refills: 0 | Status: SHIPPED | OUTPATIENT
Start: 2021-08-17 | End: 2021-08-24

## 2021-08-17 RX ORDER — SODIUM CHLORIDE, SODIUM LACTATE, POTASSIUM CHLORIDE, CALCIUM CHLORIDE 600; 310; 30; 20 MG/100ML; MG/100ML; MG/100ML; MG/100ML
INJECTION, SOLUTION INTRAVENOUS CONTINUOUS
Status: DISCONTINUED | OUTPATIENT
Start: 2021-08-17 | End: 2021-08-17 | Stop reason: HOSPADM

## 2021-08-17 RX ORDER — SODIUM CHLORIDE, SODIUM LACTATE, POTASSIUM CHLORIDE, CALCIUM CHLORIDE 600; 310; 30; 20 MG/100ML; MG/100ML; MG/100ML; MG/100ML
INJECTION, SOLUTION INTRAVENOUS CONTINUOUS
Status: DISCONTINUED | OUTPATIENT
Start: 2021-08-17 | End: 2021-08-17

## 2021-08-17 RX ORDER — DIPHENHYDRAMINE HYDROCHLORIDE 50 MG/ML
12.5 INJECTION INTRAMUSCULAR; INTRAVENOUS
Status: DISCONTINUED | OUTPATIENT
Start: 2021-08-17 | End: 2021-08-17 | Stop reason: HOSPADM

## 2021-08-17 RX ORDER — ONDANSETRON 2 MG/ML
4 INJECTION INTRAMUSCULAR; INTRAVENOUS DAILY PRN
Status: DISCONTINUED | OUTPATIENT
Start: 2021-08-17 | End: 2021-08-17 | Stop reason: HOSPADM

## 2021-08-17 RX ADMIN — HYDROCODONE BITARTRATE AND ACETAMINOPHEN 1 TABLET: 5; 325 TABLET ORAL at 10:54

## 2021-08-17 RX ADMIN — ONDANSETRON 4 MG: 2 INJECTION, SOLUTION INTRAMUSCULAR; INTRAVENOUS at 09:42

## 2021-08-17 RX ADMIN — FENTANYL CITRATE 25 MCG: 50 INJECTION, SOLUTION INTRAMUSCULAR; INTRAVENOUS at 10:38

## 2021-08-17 RX ADMIN — SODIUM CHLORIDE, POTASSIUM CHLORIDE, SODIUM LACTATE AND CALCIUM CHLORIDE: 600; 310; 30; 20 INJECTION, SOLUTION INTRAVENOUS at 08:46

## 2021-08-17 RX ADMIN — OXYBUTYNIN CHLORIDE 10 MG: 10 TABLET, EXTENDED RELEASE ORAL at 11:46

## 2021-08-17 RX ADMIN — FENTANYL CITRATE 50 MCG: 50 INJECTION, SOLUTION INTRAMUSCULAR; INTRAVENOUS at 10:30

## 2021-08-17 RX ADMIN — FENTANYL CITRATE 50 MCG: 50 INJECTION, SOLUTION INTRAMUSCULAR; INTRAVENOUS at 10:25

## 2021-08-17 RX ADMIN — PROPOFOL 150 MG: 10 INJECTION, EMULSION INTRAVENOUS at 09:21

## 2021-08-17 RX ADMIN — Medication 3000 MG: at 09:27

## 2021-08-17 RX ADMIN — FENTANYL CITRATE 25 MCG: 50 INJECTION, SOLUTION INTRAMUSCULAR; INTRAVENOUS at 10:45

## 2021-08-17 RX ADMIN — FENTANYL CITRATE 50 MCG: 50 INJECTION, SOLUTION INTRAMUSCULAR; INTRAVENOUS at 09:21

## 2021-08-17 RX ADMIN — SODIUM CHLORIDE, POTASSIUM CHLORIDE, SODIUM LACTATE AND CALCIUM CHLORIDE: 600; 310; 30; 20 INJECTION, SOLUTION INTRAVENOUS at 09:10

## 2021-08-17 RX ADMIN — LIDOCAINE HYDROCHLORIDE 50 ML: 10 INJECTION, SOLUTION EPIDURAL; INFILTRATION; INTRACAUDAL; PERINEURAL at 09:21

## 2021-08-17 ASSESSMENT — PAIN DESCRIPTION - PAIN TYPE
TYPE: SURGICAL PAIN
TYPE: ACUTE PAIN

## 2021-08-17 ASSESSMENT — PULMONARY FUNCTION TESTS
PIF_VALUE: 13
PIF_VALUE: 14
PIF_VALUE: 16
PIF_VALUE: 13
PIF_VALUE: 12
PIF_VALUE: 3
PIF_VALUE: 13
PIF_VALUE: 13
PIF_VALUE: 14
PIF_VALUE: 1
PIF_VALUE: 12
PIF_VALUE: 13
PIF_VALUE: 13
PIF_VALUE: 14
PIF_VALUE: 13
PIF_VALUE: 17
PIF_VALUE: 18
PIF_VALUE: 1
PIF_VALUE: 14
PIF_VALUE: 7
PIF_VALUE: 15
PIF_VALUE: 0
PIF_VALUE: 2
PIF_VALUE: 0
PIF_VALUE: 16
PIF_VALUE: 14
PIF_VALUE: 14
PIF_VALUE: 2
PIF_VALUE: 0
PIF_VALUE: 13
PIF_VALUE: 12
PIF_VALUE: 12
PIF_VALUE: 13
PIF_VALUE: 16
PIF_VALUE: 13
PIF_VALUE: 13
PIF_VALUE: 2
PIF_VALUE: 13
PIF_VALUE: 14
PIF_VALUE: 13
PIF_VALUE: 13
PIF_VALUE: 1
PIF_VALUE: 5
PIF_VALUE: 13
PIF_VALUE: 14
PIF_VALUE: 13
PIF_VALUE: 13
PIF_VALUE: 6
PIF_VALUE: 13
PIF_VALUE: 1
PIF_VALUE: 3
PIF_VALUE: 2
PIF_VALUE: 17
PIF_VALUE: 0
PIF_VALUE: 0

## 2021-08-17 ASSESSMENT — PAIN DESCRIPTION - LOCATION
LOCATION: BACK
LOCATION: KNEE;BACK
LOCATION: BACK
LOCATION: FLANK
LOCATION: BACK

## 2021-08-17 ASSESSMENT — PAIN DESCRIPTION - DESCRIPTORS
DESCRIPTORS: PRESSURE
DESCRIPTORS: ACHING;PRESSURE

## 2021-08-17 ASSESSMENT — PAIN SCALES - GENERAL
PAINLEVEL_OUTOF10: 4
PAINLEVEL_OUTOF10: 7
PAINLEVEL_OUTOF10: 5
PAINLEVEL_OUTOF10: 4
PAINLEVEL_OUTOF10: 7

## 2021-08-17 ASSESSMENT — PAIN DESCRIPTION - ORIENTATION
ORIENTATION: LEFT

## 2021-08-17 ASSESSMENT — PAIN - FUNCTIONAL ASSESSMENT: PAIN_FUNCTIONAL_ASSESSMENT: 0-10

## 2021-08-17 NOTE — H&P
Sadi Caldera MD [facility]    Pre-op History and Physical  Ronen Dickson PA-C    Patient:  Winsome Moreno  MRN: [id number]  YOB: 1945    HISTORY OF PRESENT ILLNESS:     The patient is a 77 y.o. female who presents with LUQ and epigastric pain.     CT A/P form 7/14/2021 showed bilateral nonobstructing kidney stones, four kidney stones on the left with largest = 0.5 cm. Patient is on K citrate.      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 Ronen Dickson PA-C independently reviewed the images and verified the radiology reports from:    No results found.       Past Medical History:    Past Medical History:   Diagnosis Date    Allergic rhinitis     Arthritis     Generalized    Borderline diabetes     Caffeine use     2 coffee day    COPD (chronic obstructive pulmonary disease) (HCC)     EARLY STAGE    GERD (gastroesophageal reflux disease)     Kidney stones     Mumps     MVP (mitral valve prolapse)     Nausea & vomiting     Palpitations     PONV (postoperative nausea and vomiting)     Ringing in ears     Schamberg's disease     Thoracic outlet syndrome     Type II or unspecified type diabetes mellitus without mention of complication, not stated as uncontrolled     Borderline, no meds    Under care of team 08/06/2021    PCP: Dr. Zev Carmen, [facility], last visit 7/2021    Under care of team 08/06/2021    Cardiologist: Rafa Sofia, last visit 5/2021    Under care of team 08/06/2021    Urology: Dr. Mario Snider, last visit 7/2021    Under care of team 08/06/2021    Endo: Dr. Patrick Diaz, [facility], last visit 5/2021       Past Surgical History:    Past Surgical History:   Procedure Laterality Date    ACHILLES TENDON SURGERY Bilateral     ANGIOPLASTY      APPENDECTOMY      BACK SURGERY      BREAST BIOPSY      CARPAL TUNNEL RELEASE Bilateral     CERVICAL SPINE SURGERY      CHOLECYSTECTOMY      COLONOSCOPY      CYSTOSCOPY      with stent 2010    DIAGNOSTIC CARDIAC CATH LAB PROCEDURE      10 YEARS AGO    ENDOSCOPY, COLON, DIAGNOSTIC      EYE SURGERY Bilateral     Cataracts    FINGER TRIGGER RELEASE Bilateral     HYSTERECTOMY      JOINT REPLACEMENT Bilateral     Knees    KNEE ARTHROSCOPY Bilateral     LITHOTRIPSY      LITHOTRIPSY      PARATHYROIDECTOMY      STOMACH SURGERY      Cyst removal    THYROIDECTOMY      TONSILLECTOMY      VASCULAR SURGERY      THORACIC OUTLET RELEASE       Medications Prior to Admission:    Prior to Admission medications    Medication Sig Start Date End Date Taking? Authorizing Provider   clotrimazole-betamethasone (LOTRISONE) 1-0.05 % lotion as needed  8/2/21  Yes Historical Provider, MD   FEROSUL 325 (65 Fe) MG tablet Take 325 mg by mouth daily  8/2/21  Yes Historical Provider, MD   SYNTHROID 137 MCG tablet Take 137 mcg by mouth Daily  8/2/21  Yes Historical Provider, MD   metoprolol (LOPRESSOR) 100 MG tablet 100 mg 2 times daily  8/2/21  Yes Historical Provider, MD   Cyanocobalamin (VITAMIN B 12 PO) Take by mouth Patient takes vit-B12 shots, last shot will be 9/21/2021, she did a series of 8 shots   Yes Historical Provider, MD   traMADol (ULTRAM) 50 MG tablet Take 50 mg by mouth every 6 hours as needed for Pain. Yes Historical Provider, MD   gentamicin (GARAMYCIN) 0.1 % cream Apply topically 2 times daily as needed Apply topically 3 times daily.    Yes Historical Provider, MD   triamcinolone (KENALOG) 0.025 % cream Apply topically as needed Apply Topically   Yes Historical Provider, MD   potassium citrate (UROCIT-K) 10 MEQ (1080 MG) extended release tablet Take 1 tablet by mouth 2 times daily 8/3/21  Yes Saul Velazquez MD   Cholecalciferol (VITAMIN D3) 125 MCG (5000 UT) TABS Take 2,000 Units by mouth daily    Yes Historical Provider, MD   Ascorbic Acid (VITAMIN C) 1000 MG tablet Take 500 mg by mouth daily    Yes Historical Provider, MD   omeprazole (PRILOSEC) 40 MG capsule Take 40 mg by mouth as needed  5/25/15  Yes Historical Provider, MD   aspirin 81 MG tablet Take 81 mg by mouth daily. Yes Historical Provider, MD   fluticasone-salmeterol (ADVAIR) 250-50 MCG/DOSE AEPB Inhale 1 puff into the lungs daily. Yes Historical Provider, MD   meloxicam (MOBIC) 15 MG tablet Take 15 mg by mouth daily. Yes Historical Provider, MD   montelukast (SINGULAIR) 10 MG tablet Take 10 mg by mouth nightly.    Yes Historical Provider, MD   gemfibrozil (LOPID) 600 MG tablet Take 600 mg by mouth daily    Yes Historical Provider, MD   HYDROcodone-acetaminophen (Alexander Comes) 5-325 MG per tablet take 1-2 tablets by mouth if needed every 12 hours for SEVERE pain 7/14/21   Historical Provider, MD   ondansetron (ZOFRAN-ODT) 8 MG TBDP disintegrating tablet dissolve 1 tablet ON TONGUE every 12 hours if needed for nausea 7/14/21   Historical Provider, MD       Allergies:  Latex, Other, Adhesive tape, Bactrim [sulfamethoxazole-trimethoprim], Codeine, Iodine, Pcn [penicillins], and Morphine    Social History:    Social History     Socioeconomic History    Marital status:      Spouse name: Not on file    Number of children: Not on file    Years of education: Not on file    Highest education level: Not on file   Occupational History    Not on file   Tobacco Use    Smoking status: Never Smoker    Smokeless tobacco: Never Used   Substance and Sexual Activity    Alcohol use: No     Comment: rare    Drug use: No    Sexual activity: Not on file   Other Topics Concern    Not on file   Social History Narrative    Not on file     Social Determinants of Health     Financial Resource Strain:     Difficulty of Paying Living Expenses:    Food Insecurity:     Worried About Running Out of Food in the Last Year:     [address] in the Last Year:    Transportation Needs:     Lack of Transportation (Medical):  Lack of Transportation (Non-Medical):    Physical Activity:     Days of Exercise per Week:     Minutes of Exercise per Session:    Stress:     Feeling of Stress :    Social Connections:     Frequency of Communication with Friends and Family:     Frequency of Social Gatherings with Friends and Family:     Attends Jew Services:     Active Member of Clubs or Organizations:     Attends Club or Organization Meetings:     Marital Status:    Intimate Partner Violence:     Fear of Current or Ex-Partner:     Emotionally Abused:     Physically Abused:     Sexually Abused:        Family History:    Family History   Problem Relation Age of Onset    Diabetes Mother        REVIEW OF SYSTEMS:  Constitutional: negative  Eyes: negative  Respiratory: negative  Cardiovascular: negative  Gastrointestinal: negative  Genitourinary: no acute issues  Musculoskeletal: negative  Skin: negative   Neurological: negative  Hematological/Lymphatic: negative  Psychological: negative    PHYSICAL EXAM:    No data found. Constitutional: Patient in NAD  Neuro: Alert and oriented to person, place, and time. Psych: Mood and affect normal.  Skin: Clean, dry, intact   Lungs: Respiratory effort normal, CTA  Cardiovascular:  Normal peripheral pulses; no murmur. Normal rhythm  Abdomen: Soft, non-tender, non-distended with no CVA, flank pain, hepatosplenomegaly or hernia. Bladder: non-tender and not distended. LABS:   No results for input(s): WBC, HGB, HCT, MCV, PLT in the last 72 hours.   No results for input(s): NA, K, CL, CO2, PHOS, BUN, CREATININE in the last 72 hours. Invalid input(s): CA  No results found for: PSA      Urinalysis: No results for input(s): COLORU, PHUR, LABCAST, WBCUA, RBCUA, MUCUS, TRICHOMONAS, YEAST, BACTERIA, CLARITYU, SPECGRAV, LEUKOCYTESUR, UROBILINOGEN, Dickson Olden in the last 72 hours. Invalid input(s): NITRATE, GLUCOSEUKETONESUAMORPHOUS     -----------------------------------------------------------------      ASSESSMENT AND PLAN:    Impression:    Bilateral non obstructing renal stones with largest on the left 5mm  Patient Active Problem List   Diagnosis    Renal calculus, bilateral    Type II or unspecified type diabetes mellitus without mention of complication, not stated as uncontrolled    Female stress incontinence    Other nonspecific finding on examination of urine    Hypocitraturia       Plan: Cystoscopy, ureteroscopy, HLL, and ureteral stent insertion LEFT in OR today. Consent obtained    The patient was counseled at length about the risks of adilene Covid-19 during their perioperative period and any recovery window from their procedure. The patient was made aware that adilene Covid-19  may worsen their prognosis for recovering from their procedure  and lend to a higher morbidity and/or mortality risk. All material risks, benefits, and reasonable alternatives including postponing the procedure were discussed. The patient does wish to proceed with the procedure at this time.         Jonatan Barrera PA-C  8:20 AM 8/17/2021

## 2021-08-17 NOTE — BRIEF OP NOTE
Brief Postoperative Note      Patient: Barbie Boyce  YOB: 1945  MRN: [id number]    Date of Procedure: 8/17/2021    Pre-Op Diagnosis: LEFT RENAL CALCULUS    Post-Op Diagnosis: Same       Procedure(s):  HOLMIUM, CYSTOSCOPY, URETEROSCOPY, LITHO, STENT PLACEMENT    Surgeon(s):  Amy Angel MD    Assistant:  Angus Lew MD    Anesthesia: General    Estimated Blood Loss (mL): Minimal    Complications: None    Specimens:   ID Type Source Tests Collected by Time Destination   1 : URINE FOR CULTURE Urine Urine, Cystoscopic CULTURE, URINE Amy Angel MD 8/17/2021 1000        Implants:  Implant Name Type Inv. Item Serial No.  Lot No. LRB No. Used Action   STENT URET 6FR L24CM PERCFLX HYDR+ SFT PGTL TAPR TIP W/O  STENT URET 6FR L24CM PERCFLX HYDR+ SFT PGTL TAPR TIP W/O  [employer]- [id number] Left 1 Implanted         Drains: * No LDAs found *    Findings:   1.  Left renal calyceal stones, largest 1 mm    Electronically signed by Angus Lew MD on 8/17/2021 at 10:23 AM

## 2021-08-17 NOTE — OP NOTE
A timeout occurred. Two patient identifiers were used. We entered the urethra with a 22 [place] scope with 30 degree lens. Urine was collected for culture. The left ureteral orifice was visualized and carefully cannulated with a straight 0.035 Glidewire. This was advanced into the kidney with fluoroscopic guidance. A Timmy sheath introducer was then used to place a second 0.035 Glidewire into the kidney, also using fluoroscopic guidance. The flexible ureteroscope was assembled, place over the Glidewire, and advanced into the kidney carefully under fluoro. The second wire remained in place as a safety. Pan-nephroscopy was completed. The stones were located in the left renal calyces and using a 200 micron laser fiber they were fragmented into sub-200 micron size pieces for easy passage. Repeat nephroscopy and ureteroscopy demonstrated no further stone fragments. In addition, there were no papillary lesions within the kidney, or erythematous patches concerning for malignant disease. With the safety wire in place, the ureteroscope was slowly retracted down the ureter. The entire ureter was surveyed. There was no evidence of stricture, stone disease, ureteral trauma, or papillary lesion. To place the stent a 22 Persian rigid cystoscope was back loaded over the wire. Under direct visualization the stent was advanced until it was in proper location. The Glidewire was then removed. A curl could be seen in the left renal pelvis under using fluoroscopic vision, and in the bladder under direct visualization. The patients bladder was drained. All instrumentation was removed. A string was left on the stent. The patient was then awakened, extubated, and discharged back to the PACU in good and stable condition. Dr. Enzo Hutson was scrubbed and present for the entirety of the surgery. Follow-Up: Patient discharged in stable condition with Flomax, oxybutynin, oral pain medications.   Patient instructed to remove stent in 5 days using the attached strings. Patient will follow up with Dr. Shane Adkins in 6 weeks with KUB prior. Patient will also send in [address] [place] [address] test for stone.     Rudolph Pressley MD  Electronically signed on 8/17/2021 at 3:49 PM        Electronically signed by Rudolph Pressley MD on 8/17/2021 at 3:48 PM

## 2021-08-17 NOTE — ANESTHESIA PRE PROCEDURE
Department of Anesthesiology  Preprocedure Note       Name:  Barbie Smart   Age:  77 y.o.  :  1945                                          MRN:  [id number]         Date:  2021      Surgeon: Emmie Singer):  Cookie Spatz, MD    Procedure: Procedure(s):  HOLMIUM, CYSTOSCOPY, URETEROSCOPY, LITHO, STENT PLACEMENT    Medications prior to admission:   Prior to Admission medications    Medication Sig Start Date End Date Taking? Authorizing Provider   clotrimazole-betamethasone (LOTRISONE) 1-0.05 % lotion as needed  21   Historical Provider, MD   FEROSUL 325 (65 Fe) MG tablet Take 325 mg by mouth daily  21   Historical Provider, MD   HYDROcodone-acetaminophen (Miguel Johnson) 5-325 MG per tablet take 1-2 tablets by mouth if needed every 12 hours for SEVERE pain 21   Historical Provider, MD   SYNTHROID 137 MCG tablet Take 137 mcg by mouth Daily  21   Historical Provider, MD   ondansetron (ZOFRAN-ODT) 8 MG TBDP disintegrating tablet dissolve 1 tablet ON TONGUE every 12 hours if needed for nausea 21   Historical Provider, MD   metoprolol (LOPRESSOR) 100 MG tablet 100 mg 2 times daily  21   Historical Provider, MD   Cyanocobalamin (VITAMIN B 12 PO) Take by mouth Patient takes vit-B12 shots, last shot will be 2021, she did a series of 8 shots    Historical Provider, MD   traMADol (ULTRAM) 50 MG tablet Take 50 mg by mouth every 6 hours as needed for Pain. Historical Provider, MD   gentamicin (GARAMYCIN) 0.1 % cream Apply topically 2 times daily as needed Apply topically 3 times daily.     Historical Provider, MD   triamcinolone (KENALOG) 0.025 % cream Apply topically as needed Apply Topically    Historical Provider, MD   potassium citrate (UROCIT-K) 10 MEQ (1080 MG) extended release tablet Take 1 tablet by mouth 2 times daily 8/3/21   Cookie Spatz, MD   Cholecalciferol (VITAMIN D3) 125 MCG (5000 UT) TABS Take 2,000 Units by mouth daily     Historical Provider, MD   Ascorbic Acid (VITAMIN C) 1000 MG tablet Take 500 mg by mouth daily     Historical Provider, MD   omeprazole (PRILOSEC) 40 MG capsule Take 40 mg by mouth as needed  5/25/15   Historical Provider, MD   aspirin 81 MG tablet Take 81 mg by mouth daily. Historical Provider, MD   fluticasone-salmeterol (ADVAIR) 250-50 MCG/DOSE AEPB Inhale 1 puff into the lungs daily. Historical Provider, MD   meloxicam (MOBIC) 15 MG tablet Take 15 mg by mouth daily. Historical Provider, MD   montelukast (SINGULAIR) 10 MG tablet Take 10 mg by mouth nightly. Historical Provider, MD   gemfibrozil (LOPID) 600 MG tablet Take 600 mg by mouth daily     Historical Provider, MD       Current medications:    Current Facility-Administered Medications   Medication Dose Route Frequency Provider Last Rate Last Admin    lactated ringers infusion   Intravenous Continuous Saúl Akers MD        ceFAZolin (ANCEF) 3000 mg in dextrose 5 % 100 mL IVPB  3,000 mg Intravenous On Call to Iris Winslow MD        clindamycin (CLEOCIN) 900 mg in dextrose 5 % 50 mL IVPB  900 mg Intravenous Once Noreen Aguilera MD        lactated ringers infusion 1,000 mL  1,000 mL Intravenous Continuous Chiqui Delarosa MD           Allergies:     Allergies   Allergen Reactions    Latex Rash    Other Shortness Of Breath    Adhesive Tape Rash    Bactrim [Sulfamethoxazole-Trimethoprim]     Codeine Other (See Comments)     UPSET STOMACH    Iodine Hives    Pcn [Penicillins] Other (See Comments)     Unknown-child, pt just had Keflex from dentist and was fine    Morphine Rash       Problem List:    Patient Active Problem List   Diagnosis Code    Renal calculus, bilateral N20.0    Type II or unspecified type diabetes mellitus without mention of complication, not stated as uncontrolled E11.9    Female stress incontinence N39.3    Other nonspecific finding on examination of urine R82.998    Hypocitraturia R82.991       Past Medical History:        Diagnosis Date    Allergic rhinitis     Arthritis     Generalized    Borderline diabetes     Caffeine use     2 coffee day    COPD (chronic obstructive pulmonary disease) (HCC)     EARLY STAGE    GERD (gastroesophageal reflux disease)     Kidney stones     Mumps     MVP (mitral valve prolapse)     Nausea & vomiting     Palpitations     PONV (postoperative nausea and vomiting)     Ringing in ears     Schamberg's disease     Thoracic outlet syndrome     Type II or unspecified type diabetes mellitus without mention of complication, not stated as uncontrolled     Borderline, no meds    Under care of team 08/06/2021    PCP: Dr. Zev Carmen, [facility], last visit 7/2021    Under care of team 08/06/2021    Cardiologist: Rafa Sofia, last visit 5/2021    Under care of team 08/06/2021    Urology: Dr. Jenny Caldera, last visit 7/2021    Under care of team 08/06/2021    Endo: Dr. Geri Romberg, [facility], last visit 5/2021       Past Surgical History:        Procedure Laterality Date    ACHILLES TENDON SURGERY Bilateral     ANGIOPLASTY      APPENDECTOMY      BACK SURGERY      BREAST BIOPSY      CARPAL TUNNEL RELEASE Bilateral     CERVICAL SPINE SURGERY      CHOLECYSTECTOMY      COLONOSCOPY      CYSTOSCOPY      with stent 2010    DIAGNOSTIC CARDIAC CATH LAB PROCEDURE      10 YEARS AGO    ENDOSCOPY, COLON, DIAGNOSTIC      EYE SURGERY Bilateral     Cataracts    FINGER TRIGGER RELEASE Bilateral     HYSTERECTOMY      JOINT REPLACEMENT Bilateral     Knees    KNEE ARTHROSCOPY Bilateral     LITHOTRIPSY      LITHOTRIPSY      PARATHYROIDECTOMY      STOMACH SURGERY      Cyst removal    THYROIDECTOMY      TONSILLECTOMY      VASCULAR SURGERY      THORACIC OUTLET RELEASE       Social History:    Social History     Tobacco Use    Smoking status: Never Smoker    Smokeless tobacco: Never Used   Substance Use Topics    Alcohol use: No     Comment: rare                                Counseling given: Not Answered      Vital Signs (Current): There were no vitals filed for this visit. BP Readings from Last 3 Encounters:   08/06/21 (!) 158/57   06/15/20 (!) 157/82   06/13/18 (!) 144/80       NPO Status:                                                                                 BMI:   Wt Readings from Last 3 Encounters:   08/06/21 272 lb (123.4 kg)   06/15/20 270 lb (122.5 kg)   06/13/18 262 lb (118.8 kg)     There is no height or weight on file to calculate BMI.    CBC:   Lab Results   Component Value Date    WBC 8.4 06/11/2014    RBC 3.77 06/11/2014    HGB 11.4 06/11/2014    HCT 34.5 06/11/2014    MCV 91.4 06/11/2014    RDW 15.3 06/11/2014     06/11/2014       CMP:   Lab Results   Component Value Date     08/06/2021    K 5.5 08/06/2021     08/06/2021    CO2 25 08/06/2021    BUN 25 08/06/2021    CREATININE 0.92 08/06/2021    GFRAA >60 08/06/2021    LABGLOM 60 08/06/2021    GLUCOSE 105 08/06/2021    CALCIUM 10.5 09/26/2016    CALCIUM 10.4 09/26/2016       POC Tests: No results for input(s): POCGLU, POCNA, POCK, POCCL, POCBUN, POCHEMO, POCHCT in the last 72 hours. Coags:   Lab Results   Component Value Date    PROTIME 10.4 06/02/2014    INR 1.0 06/02/2014    APTT 25.4 06/02/2014       HCG (If Applicable): No results found for: PREGTESTUR, PREGSERUM, HCG, HCGQUANT     ABGs: No results found for: PHART, PO2ART, FGP4HCO, YNO2UIF, BEART, T4FFYSIU     Type & Screen (If Applicable):  No results found for: LABABO, LABRH    Drug/Infectious Status (If Applicable):  No results found for: HIV, HEPCAB    COVID-19 Screening (If Applicable): No results found for: COVID19        Anesthesia Evaluation  Patient summary reviewed   history of anesthetic complications: PONV.   Airway: Mallampati: II        Dental:          Pulmonary:normal exam  breath sounds clear to auscultation  (+) COPD:                             Cardiovascular:Negative CV ROS  Exercise tolerance:

## 2021-08-17 NOTE — LETTER
Liliana Turner MD [facility], [address] [place] [address],[address]  [place], [address] [facility]  P: [phone] / F: [phone]    Brief Postoperative Note  Surgical Facility: [address] [place] [address]   8/17/21    Isi Jimenez  [id number]  1945    Dear Cole Wetzel MD,    I've enclosed a Brief Op note on a procedure performed on your patient, Isi Jimenez today. Pre-operative Diagnosis: Left kidney stones  Post-operative Diagnosis: Same    Procedure: Cystoscopy, Left ureteroscopy and Holmium laser lithotripsy with ureteral stent insertion ([id number]) with string    Anesthesia: General    Surgeon: Shane Adkins; Resident: Rudolph Pressley MD     Findings: All stones broken up nicely with Holmium laser lithotripsy. Plan: Remove stent with string in 5 days. Get Litholink 24 hour urine collection to determine etiology of stone disease in next 2-3 weeks. Follow up in 6 weeks with KUB prior. Thank you for allowing me to participate in the care of this patient. I will keep you updated on this patient's follow up and I look forward to serving you and your patients again in the future.         Electronically signed by Kandis Walker MD, FACS

## 2021-08-17 NOTE — ANESTHESIA POSTPROCEDURE EVALUATION
Department of Anesthesiology  Postprocedure Note    Patient: Chong Linton  MRN: [id number]  YOB: 1945  Date of evaluation: 8/17/2021  Time:  10:38 AM     Procedure Summary     Date: 08/17/21 Room / Location: [address]    Anesthesia Start: 2538 Anesthesia Stop: 6027    Procedure: HOLMIUM, CYSTOSCOPY, URETEROSCOPY, LITHO, STENT PLACEMENT (Left ) Diagnosis: (LEFT RENAL CALCULUS)    Surgeons: Salazar Adhikari MD Responsible Provider: Sandy Molina MD    Anesthesia Type: general ASA Status: 3          Anesthesia Type: general    Dennis Phase I: Dennis Score: 9    Dennis Phase II:      Last vitals: Reviewed and per EMR flowsheets.        Anesthesia Post Evaluation    Patient location during evaluation: PACU  Patient participation: complete - patient participated  Level of consciousness: awake  Pain score: 1  Airway patency: patent  Nausea & Vomiting: no nausea and no vomiting  Complications: no  Cardiovascular status: blood pressure returned to baseline and hemodynamically stable  Respiratory status: acceptable  Hydration status: euvolemic

## 2021-08-18 LAB
CULTURE: NO GROWTH
Lab: NORMAL
SPECIMEN DESCRIPTION: NORMAL

## 2021-09-07 ENCOUNTER — HOSPITAL ENCOUNTER (OUTPATIENT)
Dept: GENERAL RADIOLOGY | Facility: CLINIC | Age: 76
Discharge: HOME | End: 2021-09-09
Payer: MEDICARE

## 2021-09-07 DIAGNOSIS — N20.0 CALCULUS, RENAL: ICD-10-CM

## 2021-09-07 PROCEDURE — 74018 RADEX ABDOMEN 1 VIEW: CPT

## 2023-08-19 ENCOUNTER — APPOINTMENT (OUTPATIENT)
Dept: CT IMAGING | Age: 78
DRG: 393 | End: 2023-08-19
Payer: MEDICARE

## 2023-08-19 ENCOUNTER — HOSPITAL ENCOUNTER (INPATIENT)
Age: 78
LOS: 6 days | Discharge: NURSING, REHAB OR LONG-TERM CARE | DRG: 393 | End: 2023-08-25
Attending: EMERGENCY MEDICINE | Admitting: SURGERY
Payer: MEDICARE

## 2023-08-19 ENCOUNTER — APPOINTMENT (OUTPATIENT)
Dept: GENERAL RADIOLOGY | Age: 78
DRG: 393 | End: 2023-08-19
Payer: MEDICARE

## 2023-08-19 ENCOUNTER — HOSPITAL ENCOUNTER (EMERGENCY)
Age: 78
Discharge: ANOTHER HOSPITAL | DRG: 393 | End: 2023-08-19
Attending: EMERGENCY MEDICINE
Payer: MEDICARE

## 2023-08-19 VITALS
WEIGHT: 270 LBS | HEIGHT: 65 IN | BODY MASS INDEX: 44.98 KG/M2 | TEMPERATURE: 97.3 F | HEART RATE: 70 BPM | OXYGEN SATURATION: 96 % | SYSTOLIC BLOOD PRESSURE: 154 MMHG | RESPIRATION RATE: 20 BRPM | DIASTOLIC BLOOD PRESSURE: 73 MMHG

## 2023-08-19 DIAGNOSIS — U07.1 COVID-19: ICD-10-CM
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
## 2023-08-19 DIAGNOSIS — S70.12XA ILIOPSOAS MUSCLE HEMATOMA, LEFT, INITIAL ENCOUNTER: Primary | ICD-10-CM

## 2023-08-19 DIAGNOSIS — S70.12XA HEMATOMA OF LEFT ILIOPSOAS MUSCLE, INITIAL ENCOUNTER: Primary | ICD-10-CM

## 2023-08-19 DIAGNOSIS — U07.1 COVID-19 VIRUS INFECTION: ICD-10-CM

## 2023-08-19 PROBLEM — K66.1 RETROPERITONEAL HEMATOMA: Status: ACTIVE | Noted: 2023-08-19

## 2023-08-19 PROBLEM — K68.3 RETROPERITONEAL HEMATOMA: Status: ACTIVE | Noted: 2023-08-19

## 2023-08-19 LAB
ALBUMIN SERPL-MCNC: 3.9 G/DL (ref 3.5–5.2)
ALP SERPL-CCNC: 91 U/L (ref 35–104)
ALT SERPL-CCNC: 17 U/L (ref 5–33)
ANION GAP SERPL CALCULATED.3IONS-SCNC: 14 MMOL/L (ref 9–17)
AST SERPL-CCNC: 24 U/L
BASOPHILS # BLD: 0 K/UL (ref 0–0.2)
BASOPHILS NFR BLD: 0 % (ref 0–2)
BILIRUB SERPL-MCNC: 0.4 MG/DL (ref 0.3–1.2)
BUN SERPL-MCNC: 26 MG/DL (ref 8–23)
BUN/CREAT SERPL: 26 (ref 9–20)
CALCIUM SERPL-MCNC: 9.2 MG/DL (ref 8.6–10.4)
CHLORIDE SERPL-SCNC: 102 MMOL/L (ref 98–107)
CO2 SERPL-SCNC: 22 MMOL/L (ref 20–31)
CREAT SERPL-MCNC: 1 MG/DL (ref 0.5–0.9)
EKG ATRIAL RATE: 394 BPM
EKG Q-T INTERVAL: 436 MS
EKG QRS DURATION: 86 MS
EKG QTC CALCULATION (BAZETT): 446 MS
EKG R AXIS: -2 DEGREES
EKG T AXIS: 44 DEGREES
EKG VENTRICULAR RATE: 63 BPM
EOSINOPHIL # BLD: 0 K/UL (ref 0–0.44)
EOSINOPHILS RELATIVE PERCENT: 0 % (ref 1–4)
ERYTHROCYTE [DISTWIDTH] IN BLOOD BY AUTOMATED COUNT: 14 % (ref 11.8–14.4)
ETHANOL PERCENT: <0.01 %
ETHANOLAMINE SERPL-MCNC: <10 MG/DL
GFR SERPL CREATININE-BSD FRML MDRD: 58 ML/MIN/1.73M2
GLUCOSE SERPL-MCNC: 182 MG/DL (ref 70–99)
HCT VFR BLD AUTO: 37.3 % (ref 36.3–47.1)
HCT VFR BLD AUTO: 38.5 % (ref 36.3–47.1)
HGB BLD-MCNC: 11.9 G/DL (ref 11.9–15.1)
HGB BLD-MCNC: 12.2 G/DL (ref 11.9–15.1)
IMM GRANULOCYTES # BLD AUTO: 0 K/UL (ref 0–0.3)
IMM GRANULOCYTES NFR BLD: 0 %
LACTATE BLDV-SCNC: 1.6 MMOL/L (ref 0.5–2.2)
LYMPHOCYTES NFR BLD: 0.59 K/UL (ref 1.1–3.7)
LYMPHOCYTES RELATIVE PERCENT: 6 % (ref 24–43)
MCH RBC QN AUTO: 29 PG (ref 25.2–33.5)
MCHC RBC AUTO-ENTMCNC: 31.7 G/DL (ref 28.4–34.8)
MCV RBC AUTO: 91.4 FL (ref 82.6–102.9)
MONOCYTES NFR BLD: 0.29 K/UL (ref 0.1–1.2)
MONOCYTES NFR BLD: 3 % (ref 3–12)
NEUTROPHILS NFR BLD: 91 % (ref 36–65)
NEUTS SEG NFR BLD: 8.92 K/UL (ref 1.5–8.1)
NRBC BLD-RTO: 0 PER 100 WBC
PLATELET # BLD AUTO: 264 K/UL (ref 138–453)
PMV BLD AUTO: 11.2 FL (ref 8.1–13.5)
POTASSIUM SERPL-SCNC: 4.5 MMOL/L (ref 3.7–5.3)
PROT SERPL-MCNC: 6.8 G/DL (ref 6.4–8.3)
RBC # BLD AUTO: 4.21 M/UL (ref 3.95–5.11)
SARS-COV-2 RDRP RESP QL NAA+PROBE: DETECTED
SODIUM SERPL-SCNC: 138 MMOL/L (ref 135–144)
SPECIMEN DESCRIPTION: ABNORMAL
TROPONIN I SERPL HS-MCNC: 8 NG/L (ref 0–14)
WBC OTHER # BLD: 9.8 K/UL (ref 3.5–11.3)

## 2023-08-19 PROCEDURE — 6360000002 HC RX W HCPCS: Performed by: STUDENT IN AN ORGANIZED HEALTH CARE EDUCATION/TRAINING PROGRAM

## 2023-08-19 PROCEDURE — 73552 X-RAY EXAM OF FEMUR 2/>: CPT

## 2023-08-19 PROCEDURE — 85014 HEMATOCRIT: CPT

## 2023-08-19 PROCEDURE — 74176 CT ABD & PELVIS W/O CONTRAST: CPT

## 2023-08-19 PROCEDURE — 85025 COMPLETE CBC W/AUTO DIFF WBC: CPT

## 2023-08-19 PROCEDURE — 84484 ASSAY OF TROPONIN QUANT: CPT

## 2023-08-19 PROCEDURE — 96375 TX/PRO/DX INJ NEW DRUG ADDON: CPT

## 2023-08-19 PROCEDURE — 80053 COMPREHEN METABOLIC PANEL: CPT

## 2023-08-19 PROCEDURE — 6360000002 HC RX W HCPCS

## 2023-08-19 PROCEDURE — 2060000000 HC ICU INTERMEDIATE R&B

## 2023-08-19 PROCEDURE — G0480 DRUG TEST DEF 1-7 CLASSES: HCPCS

## 2023-08-19 PROCEDURE — 96376 TX/PRO/DX INJ SAME DRUG ADON: CPT

## 2023-08-19 PROCEDURE — 72125 CT NECK SPINE W/O DYE: CPT

## 2023-08-19 PROCEDURE — 73562 X-RAY EXAM OF KNEE 3: CPT

## 2023-08-19 PROCEDURE — 70486 CT MAXILLOFACIAL W/O DYE: CPT

## 2023-08-19 PROCEDURE — 96374 THER/PROPH/DIAG INJ IV PUSH: CPT

## 2023-08-19 PROCEDURE — 99285 EMERGENCY DEPT VISIT HI MDM: CPT

## 2023-08-19 PROCEDURE — 70450 CT HEAD/BRAIN W/O DYE: CPT

## 2023-08-19 PROCEDURE — 87635 SARS-COV-2 COVID-19 AMP PRB: CPT

## 2023-08-19 PROCEDURE — 83605 ASSAY OF LACTIC ACID: CPT

## 2023-08-19 PROCEDURE — 6360000002 HC RX W HCPCS: Performed by: EMERGENCY MEDICINE

## 2023-08-19 PROCEDURE — 2580000003 HC RX 258: Performed by: EMERGENCY MEDICINE

## 2023-08-19 PROCEDURE — 99221 1ST HOSP IP/OBS SF/LOW 40: CPT | Performed by: SURGERY

## 2023-08-19 PROCEDURE — 85018 HEMOGLOBIN: CPT

## 2023-08-19 PROCEDURE — 6370000000 HC RX 637 (ALT 250 FOR IP)

## 2023-08-19 PROCEDURE — 2580000003 HC RX 258

## 2023-08-19 PROCEDURE — 93005 ELECTROCARDIOGRAM TRACING: CPT

## 2023-08-19 RX ORDER — SODIUM CHLORIDE 0.9 % (FLUSH) 0.9 %
5-40 SYRINGE (ML) INJECTION EVERY 12 HOURS SCHEDULED
Status: DISCONTINUED | OUTPATIENT
Start: 2023-08-19 | End: 2023-08-25 | Stop reason: HOSPADM

## 2023-08-19 RX ORDER — SODIUM CHLORIDE 0.9 % (FLUSH) 0.9 %
5-40 SYRINGE (ML) INJECTION PRN
Status: DISCONTINUED | OUTPATIENT
Start: 2023-08-19 | End: 2023-08-25 | Stop reason: HOSPADM

## 2023-08-19 RX ORDER — ONDANSETRON 4 MG/1
4 TABLET, ORALLY DISINTEGRATING ORAL EVERY 8 HOURS PRN
Status: DISCONTINUED | OUTPATIENT
Start: 2023-08-19 | End: 2023-08-25 | Stop reason: HOSPADM

## 2023-08-19 RX ORDER — ONDANSETRON 2 MG/ML
4 INJECTION INTRAMUSCULAR; INTRAVENOUS ONCE
Status: COMPLETED | OUTPATIENT
Start: 2023-08-19 | End: 2023-08-19

## 2023-08-19 RX ORDER — PREDNISONE 50 MG/1
50 TABLET ORAL DAILY
Status: ON HOLD | COMMUNITY
Start: 2023-08-17 | End: 2023-08-23

## 2023-08-19 RX ORDER — POLYETHYLENE GLYCOL 3350 17 G/17G
17 POWDER, FOR SOLUTION ORAL DAILY
Status: DISCONTINUED | OUTPATIENT
Start: 2023-08-19 | End: 2023-08-25 | Stop reason: HOSPADM

## 2023-08-19 RX ORDER — METHOCARBAMOL 750 MG/1
750 TABLET, FILM COATED ORAL EVERY 6 HOURS SCHEDULED
Status: DISCONTINUED | OUTPATIENT
Start: 2023-08-20 | End: 2023-08-23

## 2023-08-19 RX ORDER — OXYCODONE HYDROCHLORIDE 5 MG/1
2.5 TABLET ORAL EVERY 6 HOURS PRN
Status: DISCONTINUED | OUTPATIENT
Start: 2023-08-19 | End: 2023-08-20

## 2023-08-19 RX ORDER — SODIUM CHLORIDE 9 MG/ML
INJECTION, SOLUTION INTRAVENOUS PRN
Status: DISCONTINUED | OUTPATIENT
Start: 2023-08-19 | End: 2023-08-25 | Stop reason: HOSPADM

## 2023-08-19 RX ORDER — GABAPENTIN 300 MG/1
300 CAPSULE ORAL EVERY 8 HOURS SCHEDULED
Status: DISCONTINUED | OUTPATIENT
Start: 2023-08-19 | End: 2023-08-22

## 2023-08-19 RX ORDER — ACETAMINOPHEN 500 MG
1000 TABLET ORAL EVERY 8 HOURS SCHEDULED
Status: DISCONTINUED | OUTPATIENT
Start: 2023-08-19 | End: 2023-08-23

## 2023-08-19 RX ORDER — SODIUM CHLORIDE 9 MG/ML
INJECTION, SOLUTION INTRAVENOUS CONTINUOUS
Status: DISCONTINUED | OUTPATIENT
Start: 2023-08-19 | End: 2023-08-19 | Stop reason: HOSPADM

## 2023-08-19 RX ORDER — ONDANSETRON 2 MG/ML
4 INJECTION INTRAMUSCULAR; INTRAVENOUS EVERY 6 HOURS PRN
Status: DISCONTINUED | OUTPATIENT
Start: 2023-08-19 | End: 2023-08-25 | Stop reason: HOSPADM

## 2023-08-19 RX ORDER — FENTANYL CITRATE 0.05 MG/ML
50 INJECTION, SOLUTION INTRAMUSCULAR; INTRAVENOUS ONCE
Status: COMPLETED | OUTPATIENT
Start: 2023-08-19 | End: 2023-08-19

## 2023-08-19 RX ORDER — FENTANYL CITRATE 50 UG/ML
50 INJECTION, SOLUTION INTRAMUSCULAR; INTRAVENOUS ONCE
Status: COMPLETED | OUTPATIENT
Start: 2023-08-19 | End: 2023-08-19

## 2023-08-19 RX ADMIN — ONDANSETRON 4 MG: 2 INJECTION INTRAMUSCULAR; INTRAVENOUS at 19:53

## 2023-08-19 RX ADMIN — GABAPENTIN 300 MG: 300 CAPSULE ORAL at 22:20

## 2023-08-19 RX ADMIN — FENTANYL CITRATE 50 MCG: 50 INJECTION INTRAMUSCULAR; INTRAVENOUS at 17:30

## 2023-08-19 RX ADMIN — SODIUM CHLORIDE, PRESERVATIVE FREE 5 ML: 5 INJECTION INTRAVENOUS at 21:11

## 2023-08-19 RX ADMIN — METHOCARBAMOL 750 MG: 750 TABLET ORAL at 23:23

## 2023-08-19 RX ADMIN — SODIUM CHLORIDE: 9 INJECTION, SOLUTION INTRAVENOUS at 16:38

## 2023-08-19 RX ADMIN — ACETAMINOPHEN 1000 MG: 500 TABLET ORAL at 22:20

## 2023-08-19 RX ADMIN — HYDROMORPHONE HYDROCHLORIDE 0.25 MG: 1 INJECTION, SOLUTION INTRAMUSCULAR; INTRAVENOUS; SUBCUTANEOUS at 15:58

## 2023-08-19 RX ADMIN — FENTANYL CITRATE 50 MCG: 50 INJECTION, SOLUTION INTRAMUSCULAR; INTRAVENOUS at 19:53

## 2023-08-19 RX ADMIN — ONDANSETRON 4 MG: 2 INJECTION INTRAMUSCULAR; INTRAVENOUS at 13:47

## 2023-08-19 RX ADMIN — ONDANSETRON 4 MG: 2 INJECTION INTRAMUSCULAR; INTRAVENOUS at 21:39

## 2023-08-19 RX ADMIN — HYDROMORPHONE HYDROCHLORIDE 0.25 MG: 1 INJECTION, SOLUTION INTRAMUSCULAR; INTRAVENOUS; SUBCUTANEOUS at 13:47

## 2023-08-19 ASSESSMENT — PAIN DESCRIPTION - LOCATION: LOCATION: LEG

## 2023-08-19 ASSESSMENT — PAIN SCALES - GENERAL
PAINLEVEL_OUTOF10: 10
PAINLEVEL_OUTOF10: 10
PAINLEVEL_OUTOF10: 9
PAINLEVEL_OUTOF10: 9
PAINLEVEL_OUTOF10: 10
PAINLEVEL_OUTOF10: 9
PAINLEVEL_OUTOF10: 7

## 2023-08-19 ASSESSMENT — ENCOUNTER SYMPTOMS
VOMITING: 0
COUGH: 1
COUGH: 1
SHORTNESS OF BREATH: 0
VOMITING: 0
ABDOMINAL PAIN: 0
NAUSEA: 1
SHORTNESS OF BREATH: 0
NAUSEA: 1
ABDOMINAL PAIN: 1

## 2023-08-19 ASSESSMENT — PAIN DESCRIPTION - ORIENTATION: ORIENTATION: LEFT

## 2023-08-19 ASSESSMENT — PAIN - FUNCTIONAL ASSESSMENT: PAIN_FUNCTIONAL_ASSESSMENT: 0-10

## 2023-08-19 ASSESSMENT — PAIN DESCRIPTION - DESCRIPTORS: DESCRIPTORS: ACHING

## 2023-08-19 NOTE — ED PROVIDER NOTES
[address] [place] [address] ED  Emergency Department Encounter  Emergency Medicine Resident     Pt Name:Theresa Farias  MRN: [id number]  [facility] 1945  Date of evaluation: 8/19/23  PCP:  Mitul Galo MD  Note Started: 6:45 PM EDT      CHIEF COMPLAINT       No chief complaint on file. HISTORY OF PRESENT ILLNESS  (Location/Symptom, Timing/Onset, Context/Setting, Quality, Duration, Modifying Factors, Severity.)      Lynn Medina is a 69 y.o. female who presents as a transfer from [facility] emergency department for evaluation of left iliopsoas hematoma with retroperitoneal component. Patient reports fall yesterday morning from couch, hit right forehead against a lamp and landed onto left side. Denies LOS. Unable to voice the reason she fell. States since then has had significant pain to left hip and flank and diminished abilities to range left hip. Additionally was found to be COVID-positive earlier this week, also positive on testing today at previous ED. Endorses significant pain currently and significant nausea. Reports small area of L lateral thigh with decreased sensation compared to rest of leg/other leg. Patient transferred here for trauma surgery evaluation and possible evaluation for intervention with IR. Patient is on Eliquis for A-fib/a flutter.     PAST MEDICAL / SURGICAL / SOCIAL / FAMILY HISTORY      has a past medical history of Allergic rhinitis, Arthritis, Borderline diabetes, Caffeine use, COPD (chronic obstructive pulmonary disease) ([address]), GERD (gastroesophageal reflux disease), Kidney stones, Mumps, MVP (mitral valve prolapse), Nausea & vomiting, Palpitations, PONV (postoperative nausea and vomiting), Ringing in ears, Schamberg's disease, Thoracic outlet syndrome, Type II or unspecified type diabetes mellitus without mention of complication, not stated as uncontrolled, Under care of team, Under care of team, Under care of team, and Under care of team.       has a

## 2023-08-20 PROBLEM — S70.12XA HEMATOMA OF LEFT ILIOPSOAS MUSCLE: Status: ACTIVE | Noted: 2023-08-20

## 2023-08-20 LAB
ANION GAP SERPL CALCULATED.3IONS-SCNC: 13 MMOL/L (ref 9–17)
BASOPHILS # BLD: <0.03 K/UL (ref 0–0.2)
BASOPHILS # BLD: <0.03 K/UL (ref 0–0.2)
BASOPHILS NFR BLD: 0 % (ref 0–2)
BASOPHILS NFR BLD: 0 % (ref 0–2)
BUN SERPL-MCNC: 33 MG/DL (ref 8–23)
CALCIUM SERPL-MCNC: 8.8 MG/DL (ref 8.6–10.4)
CHLORIDE SERPL-SCNC: 103 MMOL/L (ref 98–107)
CO2 SERPL-SCNC: 21 MMOL/L (ref 20–31)
CREAT SERPL-MCNC: 1.1 MG/DL (ref 0.5–0.9)
EOSINOPHIL # BLD: <0.03 K/UL (ref 0–0.44)
EOSINOPHIL # BLD: <0.03 K/UL (ref 0–0.44)
EOSINOPHILS RELATIVE PERCENT: 0 % (ref 1–4)
EOSINOPHILS RELATIVE PERCENT: 0 % (ref 1–4)
ERYTHROCYTE [DISTWIDTH] IN BLOOD BY AUTOMATED COUNT: 13.9 % (ref 11.8–14.4)
ERYTHROCYTE [DISTWIDTH] IN BLOOD BY AUTOMATED COUNT: 13.9 % (ref 11.8–14.4)
GFR SERPL CREATININE-BSD FRML MDRD: 52 ML/MIN/1.73M2
GLUCOSE BLD-MCNC: 116 MG/DL (ref 65–105)
GLUCOSE BLD-MCNC: 151 MG/DL (ref 65–105)
GLUCOSE BLD-MCNC: 93 MG/DL (ref 65–105)
GLUCOSE SERPL-MCNC: 123 MG/DL (ref 70–99)
HCT VFR BLD AUTO: 34.1 % (ref 36.3–47.1)
HCT VFR BLD AUTO: 35.1 % (ref 36.3–47.1)
HGB BLD-MCNC: 10.5 G/DL (ref 11.9–15.1)
HGB BLD-MCNC: 10.6 G/DL (ref 11.9–15.1)
IMM GRANULOCYTES # BLD AUTO: 0.04 K/UL (ref 0–0.3)
IMM GRANULOCYTES # BLD AUTO: 0.05 K/UL (ref 0–0.3)
IMM GRANULOCYTES NFR BLD: 0 %
IMM GRANULOCYTES NFR BLD: 1 %
INR PPP: 1.6
LYMPHOCYTES NFR BLD: 0.84 K/UL (ref 1.1–3.7)
LYMPHOCYTES NFR BLD: 1.29 K/UL (ref 1.1–3.7)
LYMPHOCYTES RELATIVE PERCENT: 12 % (ref 24–43)
LYMPHOCYTES RELATIVE PERCENT: 8 % (ref 24–43)
MCH RBC QN AUTO: 28.3 PG (ref 25.2–33.5)
MCH RBC QN AUTO: 29.1 PG (ref 25.2–33.5)
MCHC RBC AUTO-ENTMCNC: 30.2 G/DL (ref 28.4–34.8)
MCHC RBC AUTO-ENTMCNC: 30.8 G/DL (ref 28.4–34.8)
MCV RBC AUTO: 93.9 FL (ref 82.6–102.9)
MCV RBC AUTO: 94.5 FL (ref 82.6–102.9)
MONOCYTES NFR BLD: 0.75 K/UL (ref 0.1–1.2)
MONOCYTES NFR BLD: 0.86 K/UL (ref 0.1–1.2)
MONOCYTES NFR BLD: 7 % (ref 3–12)
MONOCYTES NFR BLD: 8 % (ref 3–12)
NEUTROPHILS NFR BLD: 79 % (ref 36–65)
NEUTROPHILS NFR BLD: 85 % (ref 36–65)
NEUTS SEG NFR BLD: 8.51 K/UL (ref 1.5–8.1)
NEUTS SEG NFR BLD: 9.14 K/UL (ref 1.5–8.1)
NRBC BLD-RTO: 0 PER 100 WBC
NRBC BLD-RTO: 0 PER 100 WBC
PLATELET # BLD AUTO: 275 K/UL (ref 138–453)
PLATELET # BLD AUTO: 290 K/UL (ref 138–453)
PMV BLD AUTO: 11.3 FL (ref 8.1–13.5)
PMV BLD AUTO: 11.6 FL (ref 8.1–13.5)
POTASSIUM SERPL-SCNC: 4.2 MMOL/L (ref 3.7–5.3)
PROTHROMBIN TIME: 19.2 SEC (ref 11.7–14.9)
RBC # BLD AUTO: 3.61 M/UL (ref 3.95–5.11)
RBC # BLD AUTO: 3.74 M/UL (ref 3.95–5.11)
SODIUM SERPL-SCNC: 137 MMOL/L (ref 135–144)
WBC OTHER # BLD: 10.7 K/UL (ref 3.5–11.3)
WBC OTHER # BLD: 10.8 K/UL (ref 3.5–11.3)

## 2023-08-20 PROCEDURE — 97530 THERAPEUTIC ACTIVITIES: CPT

## 2023-08-20 PROCEDURE — 2060000000 HC ICU INTERMEDIATE R&B

## 2023-08-20 PROCEDURE — 82947 ASSAY GLUCOSE BLOOD QUANT: CPT

## 2023-08-20 PROCEDURE — 2580000003 HC RX 258: Performed by: SURGERY

## 2023-08-20 PROCEDURE — 99232 SBSQ HOSP IP/OBS MODERATE 35: CPT | Performed by: SURGERY

## 2023-08-20 PROCEDURE — 80048 BASIC METABOLIC PNL TOTAL CA: CPT

## 2023-08-20 PROCEDURE — 85610 PROTHROMBIN TIME: CPT

## 2023-08-20 PROCEDURE — 2580000003 HC RX 258

## 2023-08-20 PROCEDURE — 6370000000 HC RX 637 (ALT 250 FOR IP)

## 2023-08-20 PROCEDURE — 51798 US URINE CAPACITY MEASURE: CPT

## 2023-08-20 PROCEDURE — 6370000000 HC RX 637 (ALT 250 FOR IP): Performed by: STUDENT IN AN ORGANIZED HEALTH CARE EDUCATION/TRAINING PROGRAM

## 2023-08-20 PROCEDURE — 94640 AIRWAY INHALATION TREATMENT: CPT

## 2023-08-20 PROCEDURE — 36415 COLL VENOUS BLD VENIPUNCTURE: CPT

## 2023-08-20 PROCEDURE — 85025 COMPLETE CBC W/AUTO DIFF WBC: CPT

## 2023-08-20 PROCEDURE — 97162 PT EVAL MOD COMPLEX 30 MIN: CPT

## 2023-08-20 RX ORDER — OXYCODONE HYDROCHLORIDE 5 MG/1
2.5 TABLET ORAL EVERY 6 HOURS PRN
Status: DISCONTINUED | OUTPATIENT
Start: 2023-08-20 | End: 2023-08-25 | Stop reason: HOSPADM

## 2023-08-20 RX ORDER — SODIUM CHLORIDE, SODIUM LACTATE, POTASSIUM CHLORIDE, AND CALCIUM CHLORIDE .6; .31; .03; .02 G/100ML; G/100ML; G/100ML; G/100ML
1000 INJECTION, SOLUTION INTRAVENOUS ONCE
Status: COMPLETED | OUTPATIENT
Start: 2023-08-20 | End: 2023-08-20

## 2023-08-20 RX ORDER — MONTELUKAST SODIUM 10 MG/1
10 TABLET ORAL NIGHTLY
Status: DISCONTINUED | OUTPATIENT
Start: 2023-08-20 | End: 2023-08-25 | Stop reason: HOSPADM

## 2023-08-20 RX ORDER — METOPROLOL TARTRATE 50 MG/1
100 TABLET, FILM COATED ORAL 2 TIMES DAILY
Status: DISCONTINUED | OUTPATIENT
Start: 2023-08-20 | End: 2023-08-23

## 2023-08-20 RX ORDER — LEVOTHYROXINE SODIUM 137 UG/1
137 TABLET ORAL DAILY
Status: DISCONTINUED | OUTPATIENT
Start: 2023-08-20 | End: 2023-08-25 | Stop reason: HOSPADM

## 2023-08-20 RX ORDER — PANTOPRAZOLE SODIUM 40 MG/1
40 TABLET, DELAYED RELEASE ORAL
Status: DISCONTINUED | OUTPATIENT
Start: 2023-08-21 | End: 2023-08-25 | Stop reason: HOSPADM

## 2023-08-20 RX ORDER — BUDESONIDE AND FORMOTEROL FUMARATE DIHYDRATE 160; 4.5 UG/1; UG/1
2 AEROSOL RESPIRATORY (INHALATION)
Status: DISCONTINUED | OUTPATIENT
Start: 2023-08-20 | End: 2023-08-25 | Stop reason: HOSPADM

## 2023-08-20 RX ADMIN — METHOCARBAMOL 750 MG: 750 TABLET ORAL at 23:07

## 2023-08-20 RX ADMIN — GABAPENTIN 300 MG: 300 CAPSULE ORAL at 06:41

## 2023-08-20 RX ADMIN — MONTELUKAST SODIUM 10 MG: 10 TABLET, FILM COATED ORAL at 21:54

## 2023-08-20 RX ADMIN — GABAPENTIN 300 MG: 300 CAPSULE ORAL at 21:54

## 2023-08-20 RX ADMIN — OXYCODONE HYDROCHLORIDE 2.5 MG: 5 TABLET ORAL at 11:27

## 2023-08-20 RX ADMIN — OXYCODONE HYDROCHLORIDE 2.5 MG: 5 TABLET ORAL at 02:33

## 2023-08-20 RX ADMIN — SODIUM CHLORIDE, PRESERVATIVE FREE 10 ML: 5 INJECTION INTRAVENOUS at 11:52

## 2023-08-20 RX ADMIN — METHOCARBAMOL 750 MG: 750 TABLET ORAL at 06:41

## 2023-08-20 RX ADMIN — POLYETHYLENE GLYCOL 3350 17 G: 17 POWDER, FOR SOLUTION ORAL at 11:29

## 2023-08-20 RX ADMIN — METHOCARBAMOL 750 MG: 750 TABLET ORAL at 18:34

## 2023-08-20 RX ADMIN — ACETAMINOPHEN 1000 MG: 500 TABLET ORAL at 15:45

## 2023-08-20 RX ADMIN — SODIUM CHLORIDE, PRESERVATIVE FREE 10 ML: 5 INJECTION INTRAVENOUS at 20:48

## 2023-08-20 RX ADMIN — BUDESONIDE AND FORMOTEROL FUMARATE DIHYDRATE 2 PUFF: 160; 4.5 AEROSOL RESPIRATORY (INHALATION) at 20:34

## 2023-08-20 RX ADMIN — ACETAMINOPHEN 1000 MG: 500 TABLET ORAL at 20:48

## 2023-08-20 RX ADMIN — ACETAMINOPHEN 1000 MG: 500 TABLET ORAL at 06:41

## 2023-08-20 RX ADMIN — SODIUM CHLORIDE, POTASSIUM CHLORIDE, SODIUM LACTATE AND CALCIUM CHLORIDE 1000 ML: 600; 310; 30; 20 INJECTION, SOLUTION INTRAVENOUS at 16:28

## 2023-08-20 RX ADMIN — GABAPENTIN 300 MG: 300 CAPSULE ORAL at 15:45

## 2023-08-20 RX ADMIN — METHOCARBAMOL 750 MG: 750 TABLET ORAL at 11:27

## 2023-08-20 ASSESSMENT — PAIN SCALES - GENERAL
PAINLEVEL_OUTOF10: 6
PAINLEVEL_OUTOF10: 9
PAINLEVEL_OUTOF10: 2
PAINLEVEL_OUTOF10: 2
PAINLEVEL_OUTOF10: 9

## 2023-08-20 ASSESSMENT — PAIN DESCRIPTION - LOCATION
LOCATION: BACK;ABDOMEN;LEG
LOCATION: BACK;ABDOMEN;LEG
LOCATION: ABDOMEN;BACK;LEG

## 2023-08-20 ASSESSMENT — PAIN DESCRIPTION - ORIENTATION
ORIENTATION: LEFT

## 2023-08-20 ASSESSMENT — PAIN DESCRIPTION - DESCRIPTORS
DESCRIPTORS: ACHING;DISCOMFORT;SORE
DESCRIPTORS: SORE;DISCOMFORT
DESCRIPTORS: DISCOMFORT;ACHING;SORE

## 2023-08-20 NOTE — CARE COORDINATION
Case Management Assessment  Initial Evaluation    Date/Time of Evaluation: 8/20/2023 10:54 AM  Assessment Completed by: Liv Enriquez RN    If patient is discharged prior to next notation, then this note serves as note for discharge by case management. Patient Name: Lakhwinder Arvizu                   YOB: 1945  Diagnosis: Retroperitoneal hematoma [K66.1]  Hematoma of left iliopsoas muscle, initial encounter [S58.72MH]  COVID-19 virus infection [U07.1]                   Date / Time: 8/19/2023  6:18 PM    Patient Admission Status: Inpatient   Readmission Risk (Low < 19, Mod (19-27), High > 27): Readmission Risk Score: 15.8    Current PCP: Dave Chappell MD  PCP verified by CM? (P) Yes    Chart Reviewed: Yes      History Provided by: (P) Patient  Patient Orientation: (P) Alert and Oriented    Patient Cognition: (P) Alert    Hospitalization in the last 30 days (Readmission):  No    If yes, Readmission Assessment in CM Navigator will be completed. Advance Directives:      Code Status: Full Code   Patient's Primary Decision Maker is: (P) Legal Next of Kin      Discharge Planning:    Patient lives with: (P) Alone Type of Home: (P) House  Primary Care Giver: (P) Self  Patient Support Systems include: (P) Children   Current Financial resources: (P) Medicare  Current community resources:    Current services prior to admission: (P) Durable Medical Equipment            Current DME: (P) Cane            Type of Home Care services:  (P) None    ADLS  Prior functional level: (P) Independent in ADLs/IADLs  Current functional level: (P) Independent in ADLs/IADLs    PT AM-PAC:   /24  OT AM-PAC:   /24    Family can provide assistance at DC: (P) No  Would you like Case Management to discuss the discharge plan with any other family members/significant others, and if so, who?     Plans to Return to Present Housing: (P) Unknown at present  Other Identified Issues/Barriers to RETURNING to current housing: none  Potential Assistance needed at discharge: (P) [address], [address]            Potential DME:    Patient expects to discharge to: (P) [address] [employer] [place] for transportation at discharge: Christiano Rivera Family    Financial    Payor: MEDICARE / Plan: MEDICARE PART A AND B / Product Type: *No Product type* /     Does insurance require precert for SNF: No    Potential assistance Purchasing Medications:    Meds-to-Beds request:        [address], [address] [facility] 1 [facility] [phone] - [phone]  [address] [place] [address] [address] [address]  Phone: [phone] Fax: [phone] - [phone] [facility], [address] [facility] [phone] Angelica Willy [phone]  [facility] [phone]  Phone: [phone] Fax: [phone]    [facility] ([facility]) - KEILA [address] [place] [address] [phone] Angelica Willy [phone]  [address] [place] [address]  Phone: [phone] Fax: [phone]      Notes:    Factors facilitating achievement of predicted outcomes: Family support, Cooperative, and Pleasant    Barriers to discharge: Medical complications    Additional Case Management Notes: patient's goal is to return home. She has been living alone as her  is currently inpatient at hospice. She may need therapy d/t recent falls.  Will need PT/OT evals when able    The Plan for Transition of Care is related to the following treatment goals of Retroperitoneal hematoma [K66.1]  Hematoma of left iliopsoas muscle, initial encounter [S70.12XA]  COVID-19 virus infection [U07.1]    The Patient and/or Patient Representative Agree with the Discharge Plan?  yes    Kristina Villela RN  Case Management Department  Ph: [phone] Fax: [phone]

## 2023-08-20 NOTE — PROGRESS NOTES
Patient fainted while working PT. Patient never loss consciousness, VS remained stable and orientation in take. Patient also hasn't voided all shift. Bladder scan 25ml. Patient encouraged to eat lunch and increase fluid intake.  Doctor notified

## 2023-08-20 NOTE — ED NOTES
[facility]  69 yof  Fall yesterday, syncope  COVID POS  Non-con CT Iliopsoas/RP hematoma on Eliquis (pt not able to get contrast)  Normal vitals  Hgb stable  Dr. Gaitan Skill aware    Accepted by Dr. David Leon, RN  08/19/23 9830
Dirk Reddy is a 69 y.o. female who presents as a transfer [facility] [place] emergency department for evaluation of left iliopsoas hematoma with retroperitoneal component. Patient reports fall yesterday morning from couch, hit right forehead against a lamp and landed onto left side. Denies LOS. Unable to voice the reason she fell.   States since then has had significant pain to left hip and flank and diminished abilities to range left hip     [place], [place]  08/19/23 2014
Pt resting on stretcher. A&Ox4. RR even and unlabored. No distress noted. Pt denies any needs at this time. Call light within reach.       Adilene Mack RN  08/19/23 2030
Report received from [facility], all questions answered. Pt resting comfortably, NAD noted. Pt remains on full cardiac monitor.  Care ongoing     Carissa Deleon RN  08/19/23 5565
The following labs were labeled with appropriate pt sticker and tubed to lab:     [] Blue     [] Lavender   [] on ice  [x] Green/yellow  [] Green/black [] on ice  [] Collie Hammed  [] on ice  [] Yellow  [] Red  [] Type/ Screen  [] ABG  [] VBG    [] COVID-19 swab    [] Rapid  [] PCR  [] Flu swab  [] Peds Viral Panel     [] Urine Sample  [] Fecal Sample  [] Pelvic Cultures  [] Blood Cultures  [] X 2  [] STREP Cultures     Vince Knott RN  08/19/23 8363
The following labs were labeled with appropriate pt sticker and tubed to lab:     [] Blue     [x] Lavender   [] on ice  [] Green/yellow  [] Green/black [] on ice  [] Dewitte Zain  [] on ice  [] Yellow  [] Red  [] Type/ Screen  [] ABG  [] VBG    [] COVID-19 swab    [] Rapid  [] PCR  [] Flu swab  [] Peds Viral Panel     [] Urine Sample  [] Fecal Sample  [] Pelvic Cultures  [] Blood Cultures  [] X 2  [] STREP Cultures       Nora Dixon RN  08/20/23 0028
Apply topically 2 times daily as needed Apply topically 3 times daily.     MELOXICAM (MOBIC) 15 MG TABLET    Take 1 tablet by mouth daily    METOPROLOL (LOPRESSOR) 100 MG TABLET    TAKE 1 TABLET TWICE A DAY    MONTELUKAST (SINGULAIR) 10 MG TABLET    Take 1 tablet by mouth nightly    OMEPRAZOLE (PRILOSEC) 40 MG CAPSULE    Take 1 capsule by mouth as needed    PREDNISONE (DELTASONE) 50 MG TABLET    Take 1 tablet by mouth daily    SYNTHROID 137 MCG TABLET    Take 1 tablet by mouth Daily    VENTOLIN  (90 BASE) MCG/ACT INHALER         Orders Placed This Encounter   Medications    fentaNYL (SUBLIMAZE) injection 50 mcg    ondansetron (ZOFRAN) injection 4 mg       SURGICAL HISTORY       Past Surgical History:   Procedure Laterality Date    ACHILLES TENDON SURGERY Bilateral     ANGIOPLASTY      APPENDECTOMY      BACK SURGERY      BREAST BIOPSY      CARPAL TUNNEL RELEASE Bilateral     CERVICAL SPINE SURGERY      CHOLECYSTECTOMY      COLONOSCOPY      CYSTOSCOPY      with stent 2010    CYSTOSCOPY Left 08/17/2021    HOLMIUM, CYSTOSCOPY, URETEROSCOPY, LITHO, STENT PLACEMENT     DIAGNOSTIC CARDIAC CATH LAB PROCEDURE      10 YEARS AGO    ENDOSCOPY, COLON, DIAGNOSTIC      EYE SURGERY Bilateral     Cataracts    FINGER TRIGGER RELEASE Bilateral     HYSTERECTOMY (CERVIX STATUS UNKNOWN)      JOINT REPLACEMENT Bilateral     Knees    KNEE ARTHROSCOPY Bilateral     LITHOTRIPSY      LITHOTRIPSY      PARATHYROIDECTOMY      STOMACH SURGERY      Cyst removal    THYROIDECTOMY      TONSILLECTOMY      URETER SURGERY Left 8/17/2021    HOLMIUM, CYSTOSCOPY, URETEROSCOPY, LITHO, STENT PLACEMENT performed by Harvey Hutchins MD at [address]       Past Medical History:   Diagnosis Date    Allergic rhinitis     Arthritis     Generalized    Borderline diabetes     Caffeine use     2 coffee day    COPD (chronic obstructive pulmonary disease) ([facility])     EARLY STAGE    GERD

## 2023-08-20 NOTE — PROGRESS NOTES
Trauma Tertiary Survey    Admit Date: 8/19/2023  Hospital day 1    [facility] [place]       Past Medical History:   Diagnosis Date    Allergic rhinitis     Arthritis     Generalized    Borderline diabetes     Caffeine use     2 coffee day    COPD (chronic obstructive pulmonary disease) (H[facility])     EARLY STAGE    GERD (gastroesophageal reflux disease)     Kidney stones     Mumps     MVP (mitral valve prolapse)     Nausea & vomiting     Palpitations     PONV (postoperative nausea and vomiting)     Ringing in ears     Schamberg's disease     Thoracic outlet syndrome     Type II or unspecified type diabetes mellitus without mention of complication, not stated as uncontrolled     Borderline, no meds    Under care of team 08/06/2021    PCP: Dr. Farhat Dominguez, last visit 7/2021    Under care of team 08/06/2021    Cardiologist: Rei Dixon, last visit 5/2021    Under care of team 08/06/2021    Urology: Dr. Adam Kay, last visit 7/2021    Under care of team 08/06/2021    Endo: Dr. Rachel Marks, [facility], last visit 5/2021       Scheduled Meds:   metoprolol  100 mg Oral BID    levothyroxine  137 mcg Oral Daily    [START ON 8/21/2023] pantoprazole  40 mg Oral QAM AC    montelukast  10 mg Oral Nightly    budesonide-formoterol  2 puff Inhalation BID RT    lactated ringers bolus  1,000 mL IntraVENous Once    sodium chloride flush  5-40 mL IntraVENous 2 times per day    polyethylene glycol  17 g Oral Daily    gabapentin  300 mg Oral 3 times per day    methocarbamol  750 mg Oral 4 times per day    acetaminophen  1,000 mg Oral 3 times per day     Continuous Infusions:   sodium chloride       PRN Meds:oxyCODONE, sodium chloride flush, sodium chloride, ondansetron **OR** ondansetron    Subjective:     Patient has no complaint of new pain. Pain is mild, worsens with movement, and some relief by rest.  There is not associated numbness.     Objective:   Patient Vitals for the past 8 hrs:   BP Temp Temp src Pulse Resp SpO2

## 2023-08-20 NOTE — PROGRESS NOTES
Occupational [address]  Occupational Therapy Not Seen Note    DATE: 2023    NAME: Hua Quan  MRN: [id number]   : 1945      Patient not seen this date for Occupational Therapy due to: Other: Pt had syncopal episode with PT this date.  OT will check back tomorrow and evaluate as appropriate     Next Scheduled Treatment: 2023    Electronically signed by NICOLE Huynh on 2023 at 1:14 PM

## 2023-08-20 NOTE — PROGRESS NOTES
Patient has almost completed her Bolus of LR that was given for her soft BP and PIOTR. Patient bladder scanned and 350 ml noted. Patient ask that we allow her to eat and then she would urinate in the purwick because she doesn't want a joiner. Wound Care: Bacitracin

## 2023-08-20 NOTE — PROGRESS NOTES
Physical Therapy  Facility/Department: [facility] CAR 2- STEPDOWN  Physical Therapy Initial Assessment    Name: Dirk Reddy  : 1945  MRN: [id number]  Date of Service: 2023    Discharge Recommendations:  Patient would benefit from continued therapy after discharge          Patient Diagnosis(es): The primary encounter diagnosis was Hematoma of left iliopsoas muscle, initial encounter. A diagnosis of COVID-19 virus infection was also pertinent to this visit. Past Medical History:  has a past medical history of Allergic rhinitis, Arthritis, Borderline diabetes, Caffeine use, COPD (chronic obstructive pulmonary disease) ([address]), GERD (gastroesophageal reflux disease), Kidney stones, Mumps, MVP (mitral valve prolapse), Nausea & vomiting, Palpitations, PONV (postoperative nausea and vomiting), Ringing in ears, Schamberg's disease, Thoracic outlet syndrome, Type II or unspecified type diabetes mellitus without mention of complication, not stated as uncontrolled, Under care of team, Under care of team, Under care of team, and Under care of team.  Past Surgical History:  has a past surgical history that includes Hysterectomy; Appendectomy; Tonsillectomy; Knee arthroscopy (Bilateral); Carpal tunnel release (Bilateral); Cervical spine surgery; back surgery; vascular surgery; Finger trigger release (Bilateral); Achilles tendon surgery (Bilateral); Diagnostic Cardiac Cath Lab Procedure; Lithotripsy; angioplasty; Cholecystectomy; Colonoscopy; Stomach surgery; Lithotripsy; Cystocopy; Thyroidectomy; parathyroidectomy; Breast biopsy; Endoscopy, colon, diagnostic; eye surgery (Bilateral); joint replacement (Bilateral); Cystoscopy (Left, 2021); and Ureter surgery (Left, 2021). Assessment   Body Structures, Functions, Activity Limitations Requiring Skilled Therapeutic Intervention: Decreased functional mobility ; Decreased ROM; Decreased balance; Increased pain;Decreased posture;Decreased endurance;Decreased Given To: Patient  Education Provided: Role of Therapy;Plan of Care  Education Provided Comments: safety; importance of calling for assistance. Education Method: Demonstration;Verbal  Education Outcome: Verbalized understanding      Therapy Time   Individual   Time In 1142   Time Out 1230   Minutes 48      Total patient care time 59 minutes. Treatment time: 29 minutes.      Vita Ritchie, PT

## 2023-08-21 LAB
ANION GAP SERPL CALCULATED.3IONS-SCNC: 12 MMOL/L (ref 9–17)
BASOPHILS # BLD: 0.03 K/UL (ref 0–0.2)
BASOPHILS NFR BLD: 0 % (ref 0–2)
BUN SERPL-MCNC: 47 MG/DL (ref 8–23)
CALCIUM SERPL-MCNC: 8 MG/DL (ref 8.6–10.4)
CHLORIDE SERPL-SCNC: 107 MMOL/L (ref 98–107)
CO2 SERPL-SCNC: 22 MMOL/L (ref 20–31)
CREAT SERPL-MCNC: 1.8 MG/DL (ref 0.5–0.9)
EOSINOPHIL # BLD: 0.2 K/UL (ref 0–0.44)
EOSINOPHILS RELATIVE PERCENT: 2 % (ref 1–4)
ERYTHROCYTE [DISTWIDTH] IN BLOOD BY AUTOMATED COUNT: 14.2 % (ref 11.8–14.4)
GFR SERPL CREATININE-BSD FRML MDRD: 29 ML/MIN/1.73M2
GLUCOSE SERPL-MCNC: 111 MG/DL (ref 70–99)
HCT VFR BLD AUTO: 29.5 % (ref 36.3–47.1)
HGB BLD-MCNC: 8.9 G/DL (ref 11.9–15.1)
IMM GRANULOCYTES # BLD AUTO: 0.04 K/UL (ref 0–0.3)
IMM GRANULOCYTES NFR BLD: 1 %
LYMPHOCYTES NFR BLD: 1.92 K/UL (ref 1.1–3.7)
LYMPHOCYTES RELATIVE PERCENT: 22 % (ref 24–43)
MCH RBC QN AUTO: 28.8 PG (ref 25.2–33.5)
MCHC RBC AUTO-ENTMCNC: 30.2 G/DL (ref 28.4–34.8)
MCV RBC AUTO: 95.5 FL (ref 82.6–102.9)
MONOCYTES NFR BLD: 0.98 K/UL (ref 0.1–1.2)
MONOCYTES NFR BLD: 11 % (ref 3–12)
NEUTROPHILS NFR BLD: 64 % (ref 36–65)
NEUTS SEG NFR BLD: 5.45 K/UL (ref 1.5–8.1)
NRBC BLD-RTO: 0 PER 100 WBC
PLATELET # BLD AUTO: 221 K/UL (ref 138–453)
PMV BLD AUTO: 11.1 FL (ref 8.1–13.5)
POTASSIUM SERPL-SCNC: 3.9 MMOL/L (ref 3.7–5.3)
RBC # BLD AUTO: 3.09 M/UL (ref 3.95–5.11)
SODIUM SERPL-SCNC: 141 MMOL/L (ref 135–144)
WBC OTHER # BLD: 8.6 K/UL (ref 3.5–11.3)

## 2023-08-21 PROCEDURE — 6370000000 HC RX 637 (ALT 250 FOR IP): Performed by: STUDENT IN AN ORGANIZED HEALTH CARE EDUCATION/TRAINING PROGRAM

## 2023-08-21 PROCEDURE — 2580000003 HC RX 258

## 2023-08-21 PROCEDURE — 85025 COMPLETE CBC W/AUTO DIFF WBC: CPT

## 2023-08-21 PROCEDURE — 36415 COLL VENOUS BLD VENIPUNCTURE: CPT

## 2023-08-21 PROCEDURE — 80048 BASIC METABOLIC PNL TOTAL CA: CPT

## 2023-08-21 PROCEDURE — 97530 THERAPEUTIC ACTIVITIES: CPT

## 2023-08-21 PROCEDURE — 97166 OT EVAL MOD COMPLEX 45 MIN: CPT

## 2023-08-21 PROCEDURE — 94760 N-INVAS EAR/PLS OXIMETRY 1: CPT

## 2023-08-21 PROCEDURE — 99232 SBSQ HOSP IP/OBS MODERATE 35: CPT | Performed by: SURGERY

## 2023-08-21 PROCEDURE — 94640 AIRWAY INHALATION TREATMENT: CPT

## 2023-08-21 PROCEDURE — 2060000000 HC ICU INTERMEDIATE R&B

## 2023-08-21 PROCEDURE — 6370000000 HC RX 637 (ALT 250 FOR IP)

## 2023-08-21 RX ADMIN — GABAPENTIN 300 MG: 300 CAPSULE ORAL at 12:27

## 2023-08-21 RX ADMIN — BUDESONIDE AND FORMOTEROL FUMARATE DIHYDRATE 2 PUFF: 160; 4.5 AEROSOL RESPIRATORY (INHALATION) at 21:09

## 2023-08-21 RX ADMIN — GABAPENTIN 300 MG: 300 CAPSULE ORAL at 06:16

## 2023-08-21 RX ADMIN — OXYCODONE HYDROCHLORIDE 2.5 MG: 5 TABLET ORAL at 16:32

## 2023-08-21 RX ADMIN — MONTELUKAST SODIUM 10 MG: 10 TABLET, FILM COATED ORAL at 20:17

## 2023-08-21 RX ADMIN — SODIUM CHLORIDE, PRESERVATIVE FREE 10 ML: 5 INJECTION INTRAVENOUS at 08:42

## 2023-08-21 RX ADMIN — LEVOTHYROXINE SODIUM 137 MCG: 137 TABLET ORAL at 06:18

## 2023-08-21 RX ADMIN — METHOCARBAMOL 750 MG: 750 TABLET ORAL at 12:27

## 2023-08-21 RX ADMIN — PANTOPRAZOLE SODIUM 40 MG: 40 TABLET, DELAYED RELEASE ORAL at 06:16

## 2023-08-21 RX ADMIN — METHOCARBAMOL 750 MG: 750 TABLET ORAL at 23:20

## 2023-08-21 RX ADMIN — POLYETHYLENE GLYCOL 3350 17 G: 17 POWDER, FOR SOLUTION ORAL at 08:42

## 2023-08-21 RX ADMIN — GABAPENTIN 300 MG: 300 CAPSULE ORAL at 20:17

## 2023-08-21 RX ADMIN — METOPROLOL TARTRATE 100 MG: 50 TABLET, FILM COATED ORAL at 08:41

## 2023-08-21 RX ADMIN — SODIUM CHLORIDE, PRESERVATIVE FREE 10 ML: 5 INJECTION INTRAVENOUS at 20:24

## 2023-08-21 RX ADMIN — METHOCARBAMOL 750 MG: 750 TABLET ORAL at 06:16

## 2023-08-21 RX ADMIN — ACETAMINOPHEN 1000 MG: 500 TABLET ORAL at 06:16

## 2023-08-21 RX ADMIN — METHOCARBAMOL 750 MG: 750 TABLET ORAL at 17:27

## 2023-08-21 RX ADMIN — ACETAMINOPHEN 1000 MG: 500 TABLET ORAL at 20:17

## 2023-08-21 RX ADMIN — METOPROLOL TARTRATE 100 MG: 50 TABLET, FILM COATED ORAL at 20:17

## 2023-08-21 RX ADMIN — ACETAMINOPHEN 1000 MG: 500 TABLET ORAL at 12:27

## 2023-08-21 RX ADMIN — BUDESONIDE AND FORMOTEROL FUMARATE DIHYDRATE 2 PUFF: 160; 4.5 AEROSOL RESPIRATORY (INHALATION) at 08:23

## 2023-08-21 ASSESSMENT — PAIN SCALES - GENERAL
PAINLEVEL_OUTOF10: 8
PAINLEVEL_OUTOF10: 7
PAINLEVEL_OUTOF10: 5
PAINLEVEL_OUTOF10: 1

## 2023-08-21 ASSESSMENT — PAIN DESCRIPTION - ORIENTATION: ORIENTATION: LEFT

## 2023-08-21 ASSESSMENT — PAIN DESCRIPTION - DESCRIPTORS: DESCRIPTORS: NUMBNESS;ACHING;SHARP

## 2023-08-21 ASSESSMENT — PAIN DESCRIPTION - LOCATION: LOCATION: LEG

## 2023-08-21 NOTE — PROGRESS NOTES
Occupational Therapy  Facility/Department: [facility] CAR 2- STEPDOWN  Occupational Therapy Initial Assessment    Name: Jessica Keys  : 1945  MRN: [id number]  Date of Service: 2023    Chief Complaint   Patient presents with    Fall       Yesterday, R eye injury, pt taking eliquis, has L leg pain, generalized weakness       Discharge Recommendations:  Patient would benefit from continued therapy after discharge  OT Equipment Recommendations  Equipment Needed: No       Patient Diagnosis(es): The primary encounter diagnosis was Hematoma of left iliopsoas muscle, initial encounter. A diagnosis of COVID-19 virus infection was also pertinent to this visit. Past Medical History:  has a past medical history of Allergic rhinitis, Arthritis, Borderline diabetes, Caffeine use, COPD (chronic obstructive pulmonary disease) ([address]), GERD (gastroesophageal reflux disease), Kidney stones, Mumps, MVP (mitral valve prolapse), Nausea & vomiting, Palpitations, PONV (postoperative nausea and vomiting), Ringing in ears, Schamberg's disease, Thoracic outlet syndrome, Type II or unspecified type diabetes mellitus without mention of complication, not stated as uncontrolled, Under care of team, Under care of team, Under care of team, and Under care of team.  Past Surgical History:  has a past surgical history that includes Hysterectomy; Appendectomy; Tonsillectomy; Knee arthroscopy (Bilateral); Carpal tunnel release (Bilateral); Cervical spine surgery; back surgery; vascular surgery; Finger trigger release (Bilateral); Achilles tendon surgery (Bilateral); Diagnostic Cardiac Cath Lab Procedure; Lithotripsy; angioplasty; Cholecystectomy; Colonoscopy; Stomach surgery; Lithotripsy; Cystocopy; Thyroidectomy; parathyroidectomy; Breast biopsy; Endoscopy, colon, diagnostic; eye surgery (Bilateral); joint replacement (Bilateral); Cystoscopy (Left, 2021); and Ureter surgery (Left, 2021).            Assessment   Performance deficits / chair  Home Equipment: Robbi Fuentes, janina (uses intermittently per pt report)  Has the patient had two or more falls in the past year or any fall with injury in the past year?: No (1 leading to admission-syncopal episode per pt report)  Receives Help From: Family  ADL Assistance: Independent  Homemaking Assistance: Independent (mona whitehead to assist)  Homemaking Responsibilities: Yes  Ambulation Assistance: Independent  Transfer Assistance: Independent  Active : Yes  Mode of Transportation: SUV  Additional Comments: Pt reports  recently entered hospice and actively passing       Objective        Safety Devices  Type of Devices: All rafia prominences offloaded;Left in bed;Nurse notified;Call light within reach  Restraints  Restraints Initially in Place: No    Bed Mobility Training  Bed Mobility Training: Yes  Overall Level of Assistance: Moderate assistance; Additional time  Interventions: Verbal cues; Safety awareness training  Supine to Sit: Moderate assistance; Additional time  Sit to Supine: Maximum assistance; Additional time  Scooting: Moderate assistance; Additional time    Balance  Sitting: Without support (SBA EOB ~20 mins)  Standing: With support (Max A w walker, tolerating ~20 seconds prior to feeling lightheaded)    Transfer Training  Transfer Training: Yes  Overall Level of Assistance: Maximum assistance; Adaptive equipment; Additional time (x2 attempts. initally unable to offload, sucessful second attempt)  Interventions: Verbal cues  Sit to Stand: Maximum assistance; Additional time; Adaptive equipment  Stand to Sit: Maximum assistance; Adaptive equipment; Additional time    Gait  Overall Level of Assistance:  (KAITLYNN for pt safety d/t report of light headedness upon standing and recent syncopal episodes)     AROM: Within functional limits  Strength:  Within functional limits (4/5 BUE grossly)  Coordination: Within functional limits (Pt is R handed)  Tone: Normal  Sensation: Impaired (numb L hip by

## 2023-08-21 NOTE — PROGRESS NOTES
Physician Progress Note      PATIENTOneal   CSN #:                  [id number]  :                       1945  ADMIT DATE:       2023 6:18 PM  [address] DATE:  RESPONDING  PROVIDER #:        Sam MILTON          QUERY TEXT:    Patient admitted with L iliopsoas and retroperitoneal hematoma. Documentation   reflects COVID positive in ED notes of . If possible, please document in   the progress notes and discharge summary if COVID was: The medical record reflects the following:  Risk Factors: COVID,  L iliopsoas and retroperitoneal hematoma  Clinical Indicators: labs show COVID positive on testing on  @ 13:20   before transfer. pt transferred to [facility] on  @ 18:16. Treatment: Droplet precaution, monitor. Thank you, please contact me for any questions. Farshad Dotson RN, CDS  cell- [phone]  office hours - [phone]  Options provided:  -- COVID confirmed after study  -- COVID ruled out after study  -- Other - I will add my own diagnosis  -- Disagree - Not applicable / Not valid  -- Disagree - Clinically unable to determine / Unknown  -- Refer to Clinical Documentation Reviewer    PROVIDER RESPONSE TEXT:    Provider disagreed with this query.   Writer only documented history and physical at time of admission, did not   document progress note nor discharge summary    Query created by: Farshad Dotson on 2023 11:10 AM      Electronically signed by:  Dagmar Cabot 2023 5:46 PM

## 2023-08-22 LAB
ANION GAP SERPL CALCULATED.3IONS-SCNC: 11 MMOL/L (ref 9–17)
BASOPHILS # BLD: <0.03 K/UL (ref 0–0.2)
BASOPHILS NFR BLD: 0 % (ref 0–2)
BNP SERPL-MCNC: 1938 PG/ML
BUN SERPL-MCNC: 54 MG/DL (ref 8–23)
CALCIUM SERPL-MCNC: 7.8 MG/DL (ref 8.6–10.4)
CHLORIDE SERPL-SCNC: 102 MMOL/L (ref 98–107)
CO2 SERPL-SCNC: 21 MMOL/L (ref 20–31)
CREAT SERPL-MCNC: 2.3 MG/DL (ref 0.5–0.9)
CREAT UR-MCNC: 124.8 MG/DL (ref 28–217)
EOSINOPHIL # BLD: 0.4 K/UL (ref 0–0.44)
EOSINOPHILS RELATIVE PERCENT: 5 % (ref 1–4)
ERYTHROCYTE [DISTWIDTH] IN BLOOD BY AUTOMATED COUNT: 14.1 % (ref 11.8–14.4)
GFR SERPL CREATININE-BSD FRML MDRD: 21 ML/MIN/1.73M2
GLUCOSE SERPL-MCNC: 108 MG/DL (ref 70–99)
HCT VFR BLD AUTO: 29.5 % (ref 36.3–47.1)
HGB BLD-MCNC: 8.7 G/DL (ref 11.9–15.1)
IMM GRANULOCYTES # BLD AUTO: 0.06 K/UL (ref 0–0.3)
IMM GRANULOCYTES NFR BLD: 1 %
LYMPHOCYTES NFR BLD: 2.08 K/UL (ref 1.1–3.7)
LYMPHOCYTES RELATIVE PERCENT: 24 % (ref 24–43)
MCH RBC QN AUTO: 28.6 PG (ref 25.2–33.5)
MCHC RBC AUTO-ENTMCNC: 29.5 G/DL (ref 28.4–34.8)
MCV RBC AUTO: 97 FL (ref 82.6–102.9)
MONOCYTES NFR BLD: 0.8 K/UL (ref 0.1–1.2)
MONOCYTES NFR BLD: 9 % (ref 3–12)
NEUTROPHILS NFR BLD: 61 % (ref 36–65)
NEUTS SEG NFR BLD: 5.29 K/UL (ref 1.5–8.1)
NRBC BLD-RTO: 0 PER 100 WBC
PLATELET # BLD AUTO: 208 K/UL (ref 138–453)
PMV BLD AUTO: 11.5 FL (ref 8.1–13.5)
POTASSIUM SERPL-SCNC: 4 MMOL/L (ref 3.7–5.3)
RBC # BLD AUTO: 3.04 M/UL (ref 3.95–5.11)
SODIUM SERPL-SCNC: 134 MMOL/L (ref 135–144)
SODIUM UR-SCNC: 57 MMOL/L
WBC OTHER # BLD: 8.7 K/UL (ref 3.5–11.3)

## 2023-08-22 PROCEDURE — 80048 BASIC METABOLIC PNL TOTAL CA: CPT

## 2023-08-22 PROCEDURE — 6370000000 HC RX 637 (ALT 250 FOR IP)

## 2023-08-22 PROCEDURE — 85025 COMPLETE CBC W/AUTO DIFF WBC: CPT

## 2023-08-22 PROCEDURE — 2580000003 HC RX 258

## 2023-08-22 PROCEDURE — 2060000000 HC ICU INTERMEDIATE R&B

## 2023-08-22 PROCEDURE — 84300 ASSAY OF URINE SODIUM: CPT

## 2023-08-22 PROCEDURE — 2580000003 HC RX 258: Performed by: NURSE PRACTITIONER

## 2023-08-22 PROCEDURE — 6360000002 HC RX W HCPCS: Performed by: NURSE PRACTITIONER

## 2023-08-22 PROCEDURE — 6370000000 HC RX 637 (ALT 250 FOR IP): Performed by: STUDENT IN AN ORGANIZED HEALTH CARE EDUCATION/TRAINING PROGRAM

## 2023-08-22 PROCEDURE — 82570 ASSAY OF URINE CREATININE: CPT

## 2023-08-22 PROCEDURE — 36415 COLL VENOUS BLD VENIPUNCTURE: CPT

## 2023-08-22 PROCEDURE — 83880 ASSAY OF NATRIURETIC PEPTIDE: CPT

## 2023-08-22 RX ORDER — HEPARIN SODIUM 5000 [USP'U]/ML
5000 INJECTION, SOLUTION INTRAVENOUS; SUBCUTANEOUS EVERY 8 HOURS SCHEDULED
Status: DISCONTINUED | OUTPATIENT
Start: 2023-08-22 | End: 2023-08-25 | Stop reason: HOSPADM

## 2023-08-22 RX ORDER — SODIUM CHLORIDE 9 MG/ML
INJECTION, SOLUTION INTRAVENOUS CONTINUOUS
Status: DISCONTINUED | OUTPATIENT
Start: 2023-08-22 | End: 2023-08-24

## 2023-08-22 RX ORDER — 0.9 % SODIUM CHLORIDE 0.9 %
1000 INTRAVENOUS SOLUTION INTRAVENOUS ONCE
Status: COMPLETED | OUTPATIENT
Start: 2023-08-22 | End: 2023-08-22

## 2023-08-22 RX ADMIN — LEVOTHYROXINE SODIUM 137 MCG: 137 TABLET ORAL at 05:21

## 2023-08-22 RX ADMIN — ACETAMINOPHEN 1000 MG: 500 TABLET ORAL at 05:21

## 2023-08-22 RX ADMIN — METHOCARBAMOL 750 MG: 750 TABLET ORAL at 17:32

## 2023-08-22 RX ADMIN — METHOCARBAMOL 750 MG: 750 TABLET ORAL at 23:33

## 2023-08-22 RX ADMIN — SODIUM CHLORIDE, PRESERVATIVE FREE 10 ML: 5 INJECTION INTRAVENOUS at 09:00

## 2023-08-22 RX ADMIN — METHOCARBAMOL 750 MG: 750 TABLET ORAL at 05:21

## 2023-08-22 RX ADMIN — OXYCODONE HYDROCHLORIDE 2.5 MG: 5 TABLET ORAL at 11:26

## 2023-08-22 RX ADMIN — OXYCODONE HYDROCHLORIDE 2.5 MG: 5 TABLET ORAL at 20:31

## 2023-08-22 RX ADMIN — SODIUM CHLORIDE: 9 INJECTION, SOLUTION INTRAVENOUS at 15:42

## 2023-08-22 RX ADMIN — SODIUM CHLORIDE: 9 INJECTION, SOLUTION INTRAVENOUS at 22:45

## 2023-08-22 RX ADMIN — METHOCARBAMOL 750 MG: 750 TABLET ORAL at 11:26

## 2023-08-22 RX ADMIN — POLYETHYLENE GLYCOL 3350 17 G: 17 POWDER, FOR SOLUTION ORAL at 09:00

## 2023-08-22 RX ADMIN — HEPARIN SODIUM 5000 UNITS: 5000 INJECTION INTRAVENOUS; SUBCUTANEOUS at 20:31

## 2023-08-22 RX ADMIN — ACETAMINOPHEN 1000 MG: 500 TABLET ORAL at 20:30

## 2023-08-22 RX ADMIN — PANTOPRAZOLE SODIUM 40 MG: 40 TABLET, DELAYED RELEASE ORAL at 05:21

## 2023-08-22 RX ADMIN — SODIUM CHLORIDE 1000 ML: 9 INJECTION, SOLUTION INTRAVENOUS at 10:19

## 2023-08-22 RX ADMIN — METOPROLOL TARTRATE 100 MG: 50 TABLET, FILM COATED ORAL at 08:59

## 2023-08-22 RX ADMIN — HEPARIN SODIUM 5000 UNITS: 5000 INJECTION INTRAVENOUS; SUBCUTANEOUS at 14:57

## 2023-08-22 RX ADMIN — ACETAMINOPHEN 1000 MG: 500 TABLET ORAL at 14:54

## 2023-08-22 RX ADMIN — GABAPENTIN 300 MG: 300 CAPSULE ORAL at 05:21

## 2023-08-22 RX ADMIN — MONTELUKAST SODIUM 10 MG: 10 TABLET, FILM COATED ORAL at 20:30

## 2023-08-22 ASSESSMENT — PAIN SCALES - GENERAL
PAINLEVEL_OUTOF10: 7
PAINLEVEL_OUTOF10: 6
PAINLEVEL_OUTOF10: 5
PAINLEVEL_OUTOF10: 7

## 2023-08-22 ASSESSMENT — PAIN DESCRIPTION - DESCRIPTORS: DESCRIPTORS: ACHING

## 2023-08-22 ASSESSMENT — PAIN DESCRIPTION - LOCATION: LOCATION: LEG

## 2023-08-22 ASSESSMENT — PAIN DESCRIPTION - ORIENTATION: ORIENTATION: LEFT

## 2023-08-22 NOTE — PLAN OF CARE
Problem: Respiratory - Adult  Goal: Achieves optimal ventilation and oxygenation  8/22/2023 0830 by Naga Lovett RCP  Outcome: Progressing   BRONCHOSPASM/BRONCHOCONSTRICTION     [x]         IMPROVE AERATION/BREATH SOUNDS  [x]   ADMINISTER BRONCHODILATOR THERAPY AS APPROPRIATE  [x]   ASSESS BREATH SOUNDS  []   IMPLEMENT AEROSOL/MDI PROTOCOL  [x]   PATIENT EDUCATION AS NEEDED

## 2023-08-22 NOTE — DISCHARGE INSTR - COC
Continuity of Care Form    Patient Name: Lazaro Qiu   :  1945  MRN:  [id number]    Admit date:  2023  Discharge date:  2023    Code Status Order: Full Code   Advance Directives:     Admitting Physician:  Yuko Penny MD  PCP: Sofi Hayes MD    Discharging Nurse:  Gabby Sow [facility] Unit/Room#:   Discharging Unit Phone Number: [phone]    Emergency Contact:   Extended Emergency Contact Information  Primary Emergency Contact: [address] Phone: [phone]  Mobile Phone: [phone]  Relation: Child  Secondary Emergency Contact: Jerome Crow  Address: [address] [facility], [address] [place] [address]  Home Phone: [phone]  Relation: Spouse    Past Surgical History:  Past Surgical History:   Procedure Laterality Date    ACHILLES TENDON SURGERY Bilateral     ANGIOPLASTY      APPENDECTOMY      BACK SURGERY      BREAST BIOPSY      CARPAL TUNNEL RELEASE Bilateral     CERVICAL SPINE SURGERY      CHOLECYSTECTOMY      COLONOSCOPY      CYSTOSCOPY      with stent     CYSTOSCOPY Left 2021    HOLMIUM, CYSTOSCOPY, URETEROSCOPY, LITHO, STENT PLACEMENT     DIAGNOSTIC CARDIAC CATH LAB PROCEDURE      10 YEARS AGO    ENDOSCOPY, COLON, DIAGNOSTIC      EYE SURGERY Bilateral     Cataracts    FINGER TRIGGER RELEASE Bilateral     HYSTERECTOMY (CERVIX STATUS UNKNOWN)      JOINT REPLACEMENT Bilateral     Knees    KNEE ARTHROSCOPY Bilateral     LITHOTRIPSY      LITHOTRIPSY      PARATHYROIDECTOMY      STOMACH SURGERY      Cyst removal    THYROIDECTOMY      TONSILLECTOMY      URETER SURGERY Left 2021    HOLMIUM, CYSTOSCOPY, URETEROSCOPY, LITHO, STENT PLACEMENT performed by Karena Kothari MD at [address] [place] [address]       Immunization History:   Immunization History   Administered Date(s) Administered    COVID-19, PFIZER Bivalent, DO NOT Dilute, (age 12y+), IM, 30 mcg/0.3 mL 10/03/2022    COVID-19, PFIZER PURPLE top, information and transfer of Dereck Parent  is necessary for the continuing treatment of the diagnosis listed and that she requires [address] for less 30 days.      Update Admission H&P: No change in H&P    PHYSICIAN SIGNATURE:  Electronically signed by REMA Bonilla CNP on 8/23/23 at 2:28 PM EDT

## 2023-08-22 NOTE — PLAN OF CARE
Problem: Discharge Planning  Goal: Discharge to home or other facility with appropriate resources  Outcome: Progressing     Problem: Safety - Adult  Goal: Free from fall injury  Outcome: Progressing     Problem: ABCDS Injury Assessment  Goal: Absence of physical injury  Outcome: Progressing     Problem: Skin/Tissue Integrity  Goal: Absence of new skin breakdown  Description: 1. Monitor for areas of redness and/or skin breakdown  2. Assess vascular access sites hourly  3. Every 4-6 hours minimum:  Change oxygen saturation probe site  4. Every 4-6 hours:  If on nasal continuous positive airway pressure, respiratory therapy assess nares and determine need for appliance change or resting period.   Outcome: Progressing     Problem: Respiratory - Adult  Goal: Achieves optimal ventilation and oxygenation  8/21/2023 2156 by Kathleen Sanders RCP  Outcome: Progressing

## 2023-08-22 NOTE — CARE COORDINATION
Transitional care planning    [phone] with pt regarding discharge plan. Pt lives alone and is agreeable to go to SNF upon discharge. SNF list provided to pt. [facility] is [facility], referral sent. Awaiting other choices, pt considering list.    1220 SNF choices are as follows:  [address]  Referrals sent to [facility] and [facility]    Pt tested covid + at home on 8/17.    1435: Clinicals faxed to [place] at [facility]. [phone] with Sheeba Cifuentes from [facility], they will not have a bed until the end of the week but will likely be able to accept at end of week. Messi with Darrell Heath from [facility], they can accept pt on 8/29 when her 10 days of covid isolation are up.

## 2023-08-22 NOTE — ACP (ADVANCE CARE PLANNING)
Advance Care Planning     Advance Care Planning Activator (Inpatient)  Conversation Note      Date of ACP Conversation: 8/22/2023     Conversation Conducted with: Patient with Decision Making Capacity    ACP Activator: Malika Pinon RN    Health Care Decision Maker:  Son: Vince Vieira    Today we documented Decision Maker(s) consistent with Legal Next of Kin hierarchy. Care Preferences    Ventilation: \"If you were in your present state of health and suddenly became very ill and were unable to breathe on your own, what would your preference be about the use of a ventilator (breathing machine) if it were available to you? \"      Would the patient desire the use of ventilator (breathing machine)?: yes    \"If your health worsens and it becomes clear that your chance of recovery is unlikely, what would your preference be about the use of a ventilator (breathing machine) if it were available to you? \"     Would the patient desire the use of ventilator (breathing machine)?: Yes      Resuscitation  \"CPR works best to restart the heart when there is a sudden event, like a heart attack, in someone who is otherwise healthy. Unfortunately, CPR does not typically restart the heart for people who have serious health conditions or who are very sick. \"    \"In the event your heart stopped as a result of an underlying serious health condition, would you want attempts to be made to restart your heart (answer \"yes\" for attempt to resuscitate) or would you prefer a natural death (answer \"no\" for do not attempt to resuscitate)? \" yes       [x] Yes   [] No   Educated Patient / Berle Picket regarding differences between Advance Directives and portable DNR orders.     Length of ACP Conversation in minutes:  10    Conversation Outcomes:  ACP discussion completed

## 2023-08-22 NOTE — DISCHARGE INSTRUCTIONS
Discharge Instructions for Trauma       What to do after you leave the hospital:      For resources transitioning back to the community following your trauma, visit [email or web address]    General questions or concerns please call the Trauma and [address] [place] [address] at [phone]. If needed, the clinic fax number is [phone]. Trauma is a life-threatening condition. Your doctor will want to closely monitor you. Be sure to go to all of your appointments.

## 2023-08-22 NOTE — PROGRESS NOTES
Team made aware of drop in BP with orthostatics. Also made aware BP was soft to begin with after the administration of Lopressor 100mg.

## 2023-08-23 ENCOUNTER — APPOINTMENT (OUTPATIENT)
Dept: GENERAL RADIOLOGY | Age: 78
DRG: 393 | End: 2023-08-23
Payer: MEDICARE

## 2023-08-23 PROBLEM — I48.20 ATRIAL FIBRILLATION, CHRONIC (HCC): Status: ACTIVE | Noted: 2023-08-23

## 2023-08-23 PROBLEM — E11.65 TYPE 2 DIABETES MELLITUS WITH HYPERGLYCEMIA (HCC): Status: ACTIVE | Noted: 2023-08-23

## 2023-08-23 PROBLEM — R55 SYNCOPE: Status: ACTIVE | Noted: 2023-08-23

## 2023-08-23 PROBLEM — E66.813 CLASS 3 SEVERE OBESITY DUE TO EXCESS CALORIES WITH SERIOUS COMORBIDITY AND BODY MASS INDEX (BMI) OF 45.0 TO 49.9 IN ADULT: Status: ACTIVE | Noted: 2023-08-23

## 2023-08-23 PROBLEM — E66.01 CLASS 3 SEVERE OBESITY DUE TO EXCESS CALORIES WITH SERIOUS COMORBIDITY AND BODY MASS INDEX (BMI) OF 45.0 TO 49.9 IN ADULT (HCC): Status: ACTIVE | Noted: 2023-08-23

## 2023-08-23 PROBLEM — U07.1 COVID-19: Status: ACTIVE | Noted: 2023-08-23

## 2023-08-23 PROBLEM — I95.0 IDIOPATHIC HYPOTENSION: Status: ACTIVE | Noted: 2023-08-23

## 2023-08-23 LAB
ANION GAP SERPL CALCULATED.3IONS-SCNC: 13 MMOL/L (ref 9–17)
BASOPHILS # BLD: 0.03 K/UL (ref 0–0.2)
BASOPHILS NFR BLD: 0 % (ref 0–2)
BNP SERPL-MCNC: 3134 PG/ML
BUN SERPL-MCNC: 46 MG/DL (ref 8–23)
CALCIUM SERPL-MCNC: 7.9 MG/DL (ref 8.6–10.4)
CHLORIDE SERPL-SCNC: 109 MMOL/L (ref 98–107)
CO2 SERPL-SCNC: 21 MMOL/L (ref 20–31)
CREAT SERPL-MCNC: 1.5 MG/DL (ref 0.5–0.9)
CREAT UR-MCNC: 75.6 MG/DL (ref 28–217)
CRP SERPL HS-MCNC: 109.8 MG/L (ref 0–5)
CRP SERPL HS-MCNC: 92.4 MG/L (ref 0–5)
EOSINOPHIL # BLD: 0.43 K/UL (ref 0–0.44)
EOSINOPHILS RELATIVE PERCENT: 5 % (ref 1–4)
ERYTHROCYTE [DISTWIDTH] IN BLOOD BY AUTOMATED COUNT: 14.4 % (ref 11.8–14.4)
GFR SERPL CREATININE-BSD FRML MDRD: 36 ML/MIN/1.73M2
GLUCOSE SERPL-MCNC: 120 MG/DL (ref 70–99)
HCT VFR BLD AUTO: 28.6 % (ref 36.3–47.1)
HGB BLD-MCNC: 8.8 G/DL (ref 11.9–15.1)
IMM GRANULOCYTES # BLD AUTO: 0.06 K/UL (ref 0–0.3)
IMM GRANULOCYTES NFR BLD: 1 %
LYMPHOCYTES NFR BLD: 1.43 K/UL (ref 1.1–3.7)
LYMPHOCYTES RELATIVE PERCENT: 16 % (ref 24–43)
MCH RBC QN AUTO: 29.2 PG (ref 25.2–33.5)
MCHC RBC AUTO-ENTMCNC: 30.8 G/DL (ref 28.4–34.8)
MCV RBC AUTO: 95 FL (ref 82.6–102.9)
MONOCYTES NFR BLD: 0.57 K/UL (ref 0.1–1.2)
MONOCYTES NFR BLD: 7 % (ref 3–12)
NEUTROPHILS NFR BLD: 71 % (ref 36–65)
NEUTS SEG NFR BLD: 6.22 K/UL (ref 1.5–8.1)
NRBC BLD-RTO: 0.2 PER 100 WBC
PLATELET # BLD AUTO: 234 K/UL (ref 138–453)
PMV BLD AUTO: 11.8 FL (ref 8.1–13.5)
POTASSIUM SERPL-SCNC: 4.1 MMOL/L (ref 3.7–5.3)
RBC # BLD AUTO: 3.01 M/UL (ref 3.95–5.11)
SODIUM SERPL-SCNC: 143 MMOL/L (ref 135–144)
SODIUM UR-SCNC: 46 MMOL/L
WBC OTHER # BLD: 8.7 K/UL (ref 3.5–11.3)

## 2023-08-23 PROCEDURE — 84300 ASSAY OF URINE SODIUM: CPT

## 2023-08-23 PROCEDURE — 6370000000 HC RX 637 (ALT 250 FOR IP)

## 2023-08-23 PROCEDURE — 97116 GAIT TRAINING THERAPY: CPT

## 2023-08-23 PROCEDURE — 6360000002 HC RX W HCPCS: Performed by: NURSE PRACTITIONER

## 2023-08-23 PROCEDURE — 99232 SBSQ HOSP IP/OBS MODERATE 35: CPT | Performed by: NURSE PRACTITIONER

## 2023-08-23 PROCEDURE — 2060000000 HC ICU INTERMEDIATE R&B

## 2023-08-23 PROCEDURE — 97530 THERAPEUTIC ACTIVITIES: CPT

## 2023-08-23 PROCEDURE — 6370000000 HC RX 637 (ALT 250 FOR IP): Performed by: NURSE PRACTITIONER

## 2023-08-23 PROCEDURE — 97110 THERAPEUTIC EXERCISES: CPT

## 2023-08-23 PROCEDURE — 36415 COLL VENOUS BLD VENIPUNCTURE: CPT

## 2023-08-23 PROCEDURE — 94640 AIRWAY INHALATION TREATMENT: CPT

## 2023-08-23 PROCEDURE — 97112 NEUROMUSCULAR REEDUCATION: CPT

## 2023-08-23 PROCEDURE — 94760 N-INVAS EAR/PLS OXIMETRY 1: CPT

## 2023-08-23 PROCEDURE — 83880 ASSAY OF NATRIURETIC PEPTIDE: CPT

## 2023-08-23 PROCEDURE — 80048 BASIC METABOLIC PNL TOTAL CA: CPT

## 2023-08-23 PROCEDURE — 2580000003 HC RX 258: Performed by: NURSE PRACTITIONER

## 2023-08-23 PROCEDURE — 99222 1ST HOSP IP/OBS MODERATE 55: CPT | Performed by: INTERNAL MEDICINE

## 2023-08-23 PROCEDURE — 6370000000 HC RX 637 (ALT 250 FOR IP): Performed by: STUDENT IN AN ORGANIZED HEALTH CARE EDUCATION/TRAINING PROGRAM

## 2023-08-23 PROCEDURE — 71045 X-RAY EXAM CHEST 1 VIEW: CPT

## 2023-08-23 PROCEDURE — 82570 ASSAY OF URINE CREATININE: CPT

## 2023-08-23 PROCEDURE — 85025 COMPLETE CBC W/AUTO DIFF WBC: CPT

## 2023-08-23 PROCEDURE — 86140 C-REACTIVE PROTEIN: CPT

## 2023-08-23 RX ORDER — ACETAMINOPHEN 500 MG
1000 TABLET ORAL EVERY 8 HOURS PRN
Status: DISCONTINUED | OUTPATIENT
Start: 2023-08-23 | End: 2023-08-25 | Stop reason: HOSPADM

## 2023-08-23 RX ORDER — GEMFIBROZIL 600 MG/1
600 TABLET, FILM COATED ORAL DAILY
Status: DISCONTINUED | OUTPATIENT
Start: 2023-08-23 | End: 2023-08-25 | Stop reason: HOSPADM

## 2023-08-23 RX ORDER — ALBUTEROL SULFATE 2.5 MG/3ML
2.5 SOLUTION RESPIRATORY (INHALATION) EVERY 6 HOURS PRN
Status: DISCONTINUED | OUTPATIENT
Start: 2023-08-23 | End: 2023-08-25 | Stop reason: HOSPADM

## 2023-08-23 RX ADMIN — METOPROLOL TARTRATE 25 MG: 50 TABLET, FILM COATED ORAL at 21:21

## 2023-08-23 RX ADMIN — OXYCODONE HYDROCHLORIDE 2.5 MG: 5 TABLET ORAL at 23:38

## 2023-08-23 RX ADMIN — BUDESONIDE AND FORMOTEROL FUMARATE DIHYDRATE 2 PUFF: 160; 4.5 AEROSOL RESPIRATORY (INHALATION) at 12:01

## 2023-08-23 RX ADMIN — HEPARIN SODIUM 5000 UNITS: 5000 INJECTION INTRAVENOUS; SUBCUTANEOUS at 05:54

## 2023-08-23 RX ADMIN — LEVOTHYROXINE SODIUM 137 MCG: 137 TABLET ORAL at 05:54

## 2023-08-23 RX ADMIN — SODIUM CHLORIDE: 9 INJECTION, SOLUTION INTRAVENOUS at 23:39

## 2023-08-23 RX ADMIN — ACETAMINOPHEN 1000 MG: 500 TABLET ORAL at 05:54

## 2023-08-23 RX ADMIN — HEPARIN SODIUM 5000 UNITS: 5000 INJECTION INTRAVENOUS; SUBCUTANEOUS at 15:14

## 2023-08-23 RX ADMIN — MONTELUKAST SODIUM 10 MG: 10 TABLET, FILM COATED ORAL at 21:21

## 2023-08-23 RX ADMIN — GEMFIBROZIL 600 MG: 600 TABLET ORAL at 15:14

## 2023-08-23 RX ADMIN — POLYETHYLENE GLYCOL 3350 17 G: 17 POWDER, FOR SOLUTION ORAL at 08:42

## 2023-08-23 RX ADMIN — PANTOPRAZOLE SODIUM 40 MG: 40 TABLET, DELAYED RELEASE ORAL at 05:54

## 2023-08-23 RX ADMIN — SODIUM CHLORIDE: 9 INJECTION, SOLUTION INTRAVENOUS at 15:15

## 2023-08-23 RX ADMIN — METHOCARBAMOL 750 MG: 750 TABLET ORAL at 05:53

## 2023-08-23 RX ADMIN — OXYCODONE HYDROCHLORIDE 2.5 MG: 5 TABLET ORAL at 08:56

## 2023-08-23 RX ADMIN — BUDESONIDE AND FORMOTEROL FUMARATE DIHYDRATE 2 PUFF: 160; 4.5 AEROSOL RESPIRATORY (INHALATION) at 21:45

## 2023-08-23 RX ADMIN — OXYCODONE HYDROCHLORIDE 2.5 MG: 5 TABLET ORAL at 16:12

## 2023-08-23 RX ADMIN — HEPARIN SODIUM 5000 UNITS: 5000 INJECTION INTRAVENOUS; SUBCUTANEOUS at 21:21

## 2023-08-23 ASSESSMENT — PAIN SCALES - GENERAL
PAINLEVEL_OUTOF10: 6
PAINLEVEL_OUTOF10: 7
PAINLEVEL_OUTOF10: 10
PAINLEVEL_OUTOF10: 7

## 2023-08-23 NOTE — CONSULTS
[facility]    Consult / H&P               Today's Date: 8/23/2023  Patient Name: Emely Brito  Date of admission: 8/19/2023  6:18 PM  Patient's age: 69 y.o., 1945  Admission Dx: Retroperitoneal hematoma [K66.1]  Hematoma of left iliopsoas muscle, initial encounter [S70.12XA]  COVID-19 virus infection [U07.1]    Reason for Consult: Hypotensive and dizzy after taking lopressor for rate control    Requesting Physician: Mirta Smith MD    CHIEF COMPLAINT: Fall    History Obtained From:  patient, EMR    HISTORY OF PRESENT ILLNESS:      The patient is a 69 y.o. female who presents as a transfer from [address] [place] [address] At [facility] after a fall on Friday morning. She was walking in living room when she felt like she was going to lose her balance, went to sit on the couch and miss. She did hit her head and she did lose consciousness. Imaging at [facility]. Kelli's revealed Left iliopsoas hematoma along entire length of muscle as well as a retroperitoneal hematoma. The patients eliquis was held, but she was continued on her home dose lopressor 100mg bid. Yesterday it was noted that the patient became hypotensive and dizzy after administration of lopressor for rate control, BP 85/44. Orthostatic vitals were negative yesterday. Normal heart catheterization 05/2001 with normal LV function. Echo 08/24/2016: EF 50%.  Grade I DD  Tilt table test on 09/01/2016 positive for vasodepressor response    Past Medical History:   has a past medical history of Allergic rhinitis, Arthritis, Borderline diabetes, Caffeine use, COPD (chronic obstructive pulmonary disease) ([address]), GERD (gastroesophageal reflux disease), Kidney stones, Mumps, MVP (mitral valve prolapse), Nausea & vomiting, Palpitations, PONV (postoperative nausea and vomiting), Ringing in ears, Schamberg's disease, Thoracic outlet syndrome, Type II or unspecified type diabetes mellitus without mention of complication, not stated as uncontrolled, Under care of team, Atrial Fibrillation Stroke Risk   Risk   Factors  Component Value   C CHF No 0   H HTN No 0   A2 Age >= 76 Yes,  (80 y.o.) 2   D DM Yes 1   S2 Prior Stroke/TIA No 0   V Vascular Disease No 0   A Age 77-78 No,  (80 y.o.) 0   Sc Sex female 1    [id number]  Score  4   Score last updated 8/23/23 8:14 AM EDT    Disclaimer: Risk Score calculation is dependent on accuracy of patient problem list and past encounter diagnosis. Decrease lopressor to 25mg bid with holding parameters  Restart eliquis once okay per surgery    Will discuss with rounding attending for final recommendations. Nicole Zuniga MD  Cardiology Service  Internal Medicine Residency Program, PGY-2  [address]      Attending Physician Statement  I have discussed the case of Heidi Cummings including pertinent history and exam findings with the student/resident/fellow. I have seen and examined the patient and the key elements of the encounter have been performed by me. I agree with the assessment, plan and orders as documented by the resident With changes made to the note.      Electronically signed by Vazquez Andrew MD on 8/23/2023 at 2:56 PM.    [facility]      [phone]

## 2023-08-23 NOTE — PROGRESS NOTES
and pivot transfers and re eval as appropriate  Short Term Goal 4: Standing balance static min assist +1 w/ rwalker  Patient Goals   Patient Goals : Pt goal is to get up and walk again    Education  Patient Education  Education Given To: Patient  Education Provided: Role of Therapy;Plan of Care  Education Provided Comments: safety; importance of calling for assistance.   Education Method: Demonstration;Verbal  Education Outcome: Verbalized understanding    Therapy Time   Individual Concurrent Group Co-treatment   Time In 0640         Time Out 2503         Minutes 53         Timed Code Treatment Minutes: [address], PT, DPT

## 2023-08-23 NOTE — PLAN OF CARE
Problem: Discharge Planning  Goal: Discharge to home or other facility with appropriate resources  Outcome: Progressing     Problem: Safety - Adult  Goal: Free from fall injury  Outcome: Progressing     Problem: ABCDS Injury Assessment  Goal: Absence of physical injury  Outcome: Progressing     Problem: Respiratory - Adult  Goal: Achieves optimal ventilation and oxygenation  8/22/2023 0830 by Nahun Hawkins RCP  Outcome: Progressing

## 2023-08-23 NOTE — PLAN OF CARE
Problem: Discharge Planning  Goal: Discharge to home or other facility with appropriate resources  Outcome: Progressing     Problem: Safety - Adult  Goal: Free from fall injury  Outcome: Progressing     Problem: ABCDS Injury Assessment  Goal: Absence of physical injury  Outcome: Progressing     Problem: Skin/Tissue Integrity  Goal: Absence of new skin breakdown  Description: 1. Monitor for areas of redness and/or skin breakdown  2. Assess vascular access sites hourly  3. Every 4-6 hours minimum:  Change oxygen saturation probe site  4. Every 4-6 hours:  If on nasal continuous positive airway pressure, respiratory therapy assess nares and determine need for appliance change or resting period.   Outcome: Progressing     Problem: Pain  Goal: Verbalizes/displays adequate comfort level or baseline comfort level  Outcome: Progressing     Problem: Respiratory - Adult  Goal: Achieves optimal ventilation and oxygenation  Outcome: Progressing     Problem: Chronic Conditions and Co-morbidities  Goal: Patient's chronic conditions and co-morbidity symptoms are monitored and maintained or improved  Outcome: Progressing

## 2023-08-23 NOTE — PROGRESS NOTES
Writer was able to reach Elsa Almeida after Eb Gibson sent secure message that stated this patient she is complaining of worsening SOB and cough. SPO2 99% on RA. She is requesting a breathing treatment. No orders were placed by previous resident. She also has a CXR that ID ordered earlier that is still in process. Resident made aware that day shift nurse is signing out and may call the unit to speak to night shift with any questions.

## 2023-08-23 NOTE — PROGRESS NOTES
Patient complaining of SOB and worsening cough. Writer reached out to Trauma resident Dr. Naga Smith. Writer was instructed to reach out to Wade Gilliland however unable to reach via perfect serve. Waiting for response from resident.

## 2023-08-23 NOTE — PROGRESS NOTES
Physician Progress Note      [id number]  :                       1945  ADMIT DATE:       2023 6:18 PM  [address] [facility] DATE:  RESPONDING  PROVIDER #:        Molly Pulido CNP          QUERY TEXT:    Patient admitted with L iliopsoas and retroperitoneal hematoma. Documentation   reflects COVID positive in ED notes of . Previously answered per Resident   and disagreed with query as only did H&P. If possible, please review for any   further clarification of the diagnosis of COVID :    The medical record reflects the following:  Risk Factors: COVID,  L iliopsoas and retroperitoneal hematoma  Clinical Indicators: labs show COVID positive on testing on  @ 13:20   before transfer. pt transferred to [facility] on  @ 18:16. Treatment: Droplet precaution, monitor. Thank you, please contact me for any questions. Dayne Harris RN, CDS  cell- [phone]  office hours - [id number]  Options provided:  -- COVID confirmed after study  -- COVID ruled out after study  -- Other - I will add my own diagnosis  -- Disagree - Not applicable / Not valid  -- Disagree - Clinically unable to determine / Unknown  -- Refer to Clinical Documentation Reviewer    PROVIDER RESPONSE TEXT:    COVID confirmed after study.     Query created by: Dayne Harris on 2023 7:06 AM      Electronically signed by:  Molly Pulido CNP 2023 1:50 PM

## 2023-08-23 NOTE — PROGRESS NOTES
Physician Progress Note      PATIENT:               Marcel Becker  CSN #:                  [id number]  :                       1945  ADMIT DATE:       2023 6:18 PM  [address] [facility] DATE:  RESPONDING  PROVIDER #:        Ana Rosa Abbott MD          QUERY TEXT:    Patient admitted with L iliopsoas and retroperitoneal hematoma, noted to have   atrial fibrillation and is maintained on Eliquis. If possible, please document   in progress notes and discharge summary if you are evaluating and/or treating   any of the following: The medical record reflects the following:  Risk Factors: L iliopsoas and retroperitoneal hematoma, Atrial fibrillation  Clinical Indicators: telemetry noted as Atrial Fibrillation with rate   controlled from 66-74  Treatment: PO- Eliquis, on hold due to hematoma and fall. Thank you, please contact me for any questions. Angelita Dyson RN, [facility]  wzvh- [phone]  office hours - [phone]  Options provided:  -- Secondary hypercoagulable state in a patient with atrial fibrillation  -- Other - I will add my own diagnosis  -- Disagree - Not applicable / Not valid  -- Disagree - Clinically unable to determine / Unknown  -- Refer to Clinical Documentation Reviewer    PROVIDER RESPONSE TEXT:    Provider is clinically unable to determine a response to this query.     Query created by: Angelita Dyson on 2023 11:16 AM      Electronically signed by:  Ana Rosa Abbott MD 2023 10:54 PM

## 2023-08-23 NOTE — PROGRESS NOTES
PROGRESS NOTE          PATIENT NAME: [facility] RECORD NO. [id number]  DATE: 8/23/2023    HD: # 4      Patient Active Problem List   Diagnosis    Renal calculus, bilateral    Type II or unspecified type diabetes mellitus without mention of complication, not stated as uncontrolled    Female stress incontinence    Other nonspecific finding on examination of urine    Hypocitraturia    Retroperitoneal hematoma    Hematoma of left iliopsoas muscle       DIAGNOSIS AND PLAN  L iliopsoas and retroperitoneal hematoma  Anemia   Hemoglobin stable   Hold eliquis for now     PIOTR  Positive orthostatic hypotension   IVF given   Creatinine improving today   Continue to monitor intake/output   Will order ABIDA hose   Follow up urine sodium and urine creatinine    COVID   In isolation   Patient states she testing positive on 9/17 but did have symptoms prior to that date. On singulair and symbicort   Consult ID    AFib   Rate controlled   Continue metoprolol    DVT proph   Continue heparin SC    GI   On protonix   Continue general diet    Pain management   Continue PRN tylenol and kianna    Hypothyroidism   Continue home synthroid    Dispo   Pending resolution/improvement of PIOTR   Pt/ot         Subjective    Patient seen and examined at bedside. Patient states she is feeling better. She states she fell at home but is unsure how she fell. She did not call for help right away as she did not think she was injured and states she has had a lot of stressful events recently. Her  was recently placed in a hospice facility and she was recently diagnosed with covid so she was not feeling well. Patient states she laid on the couch but the next morning she was unable to move well. States it took her about 30 minutes just to get to the bathroom so she decided to call her son for help. Patient states she has numbness to her anterior left thigh but can feel below her knee.   States she does have pain to her left flank

## 2023-08-23 NOTE — CONSULTS
unspecified type diabetes mellitus without mention of complication, not stated as uncontrolled     Borderline, no meds    Under care of team 08/06/2021    PCP: Ajay Marte, last visit 7/2021    Under care of team 08/06/2021    Cardiologist: Chris Dan, last visit 5/2021    Under care of team 08/06/2021    Urology: Dr. Debbie Whitt, last visit 7/2021    Under care of team 08/06/2021    Endo: Dr. Brenda Griffin, [facility], last visit 5/2021       Past Surgical  History:     Past Surgical History:   Procedure Laterality Date    ACHILLES TENDON SURGERY Bilateral     ANGIOPLASTY      APPENDECTOMY      BACK SURGERY      BREAST BIOPSY      CARPAL TUNNEL RELEASE Bilateral     CERVICAL [facility]      with stent 2010    CYSTOSCOPY Left 08/17/2021    HOLMIUM, CYSTOSCOPY, URETEROSCOPY, LITHO, STENT PLACEMENT     DIAGNOSTIC CARDIAC CATH LAB PROCEDURE      10 YEARS AGO    ENDOSCOPY, COLON, DIAGNOSTIC      EYE SURGERY Bilateral     Cataracts    FINGER TRIGGER RELEASE Bilateral     HYSTERECTOMY (CERVIX STATUS UNKNOWN)      JOINT REPLACEMENT Bilateral     Knees    KNEE ARTHROSCOPY Bilateral     LITHOTRIPSY      LITHOTRIPSY      PARATHYROIDECTOMY      STOMACH SURGERY      Cyst removal    THYROIDECTOMY      TONSILLECTOMY      URETER SURGERY Left 8/17/2021    HOLMIUM, CYSTOSCOPY, URETEROSCOPY, LITHO, STENT PLACEMENT performed by Enrique Kendrick MD at [address] [place] [address]       Medications:      metoprolol  25 mg Oral BID    gemfibrozil  600 mg Oral Daily    heparin (porcine)  5,000 Units SubCUTAneous 3 times per day    levothyroxine  137 mcg Oral Daily    pantoprazole  40 mg Oral QAM AC    montelukast  10 mg Oral Nightly    budesonide-formoterol  2 puff Inhalation BID RT    sodium chloride flush  5-40 mL IntraVENous 2 times per day    polyethylene glycol  17 g Oral Daily       Social History:     Social History reviewed  Reviewed need for follow up as outpatient.     Salima Morales MD.      Pager: [phone] - Office: [phone]

## 2023-08-24 ENCOUNTER — APPOINTMENT (OUTPATIENT)
Dept: GENERAL RADIOLOGY | Age: 78
DRG: 393 | End: 2023-08-24
Payer: MEDICARE

## 2023-08-24 LAB
ANION GAP SERPL CALCULATED.3IONS-SCNC: 10 MMOL/L (ref 9–17)
BASOPHILS # BLD: 0.04 K/UL (ref 0–0.2)
BASOPHILS NFR BLD: 1 % (ref 0–2)
BNP SERPL-MCNC: 4034 PG/ML
BUN SERPL-MCNC: 29 MG/DL (ref 8–23)
CALCIUM SERPL-MCNC: 8 MG/DL (ref 8.6–10.4)
CHLORIDE SERPL-SCNC: 111 MMOL/L (ref 98–107)
CO2 SERPL-SCNC: 17 MMOL/L (ref 20–31)
CREAT SERPL-MCNC: 1 MG/DL (ref 0.5–0.9)
EKG ATRIAL RATE: 394 BPM
EKG Q-T INTERVAL: 436 MS
EKG QRS DURATION: 86 MS
EKG QTC CALCULATION (BAZETT): 446 MS
EKG R AXIS: -2 DEGREES
EKG T AXIS: 44 DEGREES
EKG VENTRICULAR RATE: 63 BPM
EOSINOPHIL # BLD: 0.54 K/UL (ref 0–0.44)
EOSINOPHILS RELATIVE PERCENT: 7 % (ref 1–4)
ERYTHROCYTE [DISTWIDTH] IN BLOOD BY AUTOMATED COUNT: 14.9 % (ref 11.8–14.4)
GFR SERPL CREATININE-BSD FRML MDRD: 58 ML/MIN/1.73M2
GLUCOSE SERPL-MCNC: 126 MG/DL (ref 70–99)
HCT VFR BLD AUTO: 27.8 % (ref 36.3–47.1)
HGB BLD-MCNC: 8.1 G/DL (ref 11.9–15.1)
IMM GRANULOCYTES # BLD AUTO: 0.05 K/UL (ref 0–0.3)
IMM GRANULOCYTES NFR BLD: 1 %
LYMPHOCYTES NFR BLD: 1.45 K/UL (ref 1.1–3.7)
LYMPHOCYTES RELATIVE PERCENT: 17 % (ref 24–43)
MAGNESIUM SERPL-MCNC: 2.1 MG/DL (ref 1.6–2.6)
MCH RBC QN AUTO: 30.1 PG (ref 25.2–33.5)
MCHC RBC AUTO-ENTMCNC: 29.1 G/DL (ref 28.4–34.8)
MCV RBC AUTO: 103.3 FL (ref 82.6–102.9)
MONOCYTES NFR BLD: 0.68 K/UL (ref 0.1–1.2)
MONOCYTES NFR BLD: 8 % (ref 3–12)
NEUTROPHILS NFR BLD: 67 % (ref 36–65)
NEUTS SEG NFR BLD: 5.55 K/UL (ref 1.5–8.1)
NRBC BLD-RTO: 0 PER 100 WBC
PLATELET # BLD AUTO: 255 K/UL (ref 138–453)
PMV BLD AUTO: 10.8 FL (ref 8.1–13.5)
POTASSIUM SERPL-SCNC: 4.4 MMOL/L (ref 3.7–5.3)
RBC # BLD AUTO: 2.69 M/UL (ref 3.95–5.11)
RBC # BLD: ABNORMAL 10*6/UL
RBC # BLD: ABNORMAL 10*6/UL
SODIUM SERPL-SCNC: 138 MMOL/L (ref 135–144)
WBC OTHER # BLD: 8.3 K/UL (ref 3.5–11.3)

## 2023-08-24 PROCEDURE — 2580000003 HC RX 258

## 2023-08-24 PROCEDURE — 6360000002 HC RX W HCPCS: Performed by: NURSE PRACTITIONER

## 2023-08-24 PROCEDURE — 6370000000 HC RX 637 (ALT 250 FOR IP)

## 2023-08-24 PROCEDURE — 6360000002 HC RX W HCPCS: Performed by: STUDENT IN AN ORGANIZED HEALTH CARE EDUCATION/TRAINING PROGRAM

## 2023-08-24 PROCEDURE — 80048 BASIC METABOLIC PNL TOTAL CA: CPT

## 2023-08-24 PROCEDURE — 99233 SBSQ HOSP IP/OBS HIGH 50: CPT | Performed by: NURSE PRACTITIONER

## 2023-08-24 PROCEDURE — 99232 SBSQ HOSP IP/OBS MODERATE 35: CPT | Performed by: INTERNAL MEDICINE

## 2023-08-24 PROCEDURE — 6370000000 HC RX 637 (ALT 250 FOR IP): Performed by: STUDENT IN AN ORGANIZED HEALTH CARE EDUCATION/TRAINING PROGRAM

## 2023-08-24 PROCEDURE — 94640 AIRWAY INHALATION TREATMENT: CPT

## 2023-08-24 PROCEDURE — 85025 COMPLETE CBC W/AUTO DIFF WBC: CPT

## 2023-08-24 PROCEDURE — 83735 ASSAY OF MAGNESIUM: CPT

## 2023-08-24 PROCEDURE — 36415 COLL VENOUS BLD VENIPUNCTURE: CPT

## 2023-08-24 PROCEDURE — 6370000000 HC RX 637 (ALT 250 FOR IP): Performed by: NURSE PRACTITIONER

## 2023-08-24 PROCEDURE — 71045 X-RAY EXAM CHEST 1 VIEW: CPT

## 2023-08-24 PROCEDURE — 83880 ASSAY OF NATRIURETIC PEPTIDE: CPT

## 2023-08-24 PROCEDURE — 2060000000 HC ICU INTERMEDIATE R&B

## 2023-08-24 RX ORDER — FUROSEMIDE 10 MG/ML
20 INJECTION INTRAMUSCULAR; INTRAVENOUS ONCE
Status: COMPLETED | OUTPATIENT
Start: 2023-08-24 | End: 2023-08-24

## 2023-08-24 RX ORDER — IPRATROPIUM BROMIDE AND ALBUTEROL SULFATE 2.5; .5 MG/3ML; MG/3ML
1 SOLUTION RESPIRATORY (INHALATION)
Status: DISCONTINUED | OUTPATIENT
Start: 2023-08-24 | End: 2023-08-24

## 2023-08-24 RX ADMIN — METOPROLOL TARTRATE 25 MG: 50 TABLET, FILM COATED ORAL at 09:13

## 2023-08-24 RX ADMIN — MONTELUKAST SODIUM 10 MG: 10 TABLET, FILM COATED ORAL at 22:27

## 2023-08-24 RX ADMIN — METOPROLOL TARTRATE 25 MG: 50 TABLET, FILM COATED ORAL at 22:27

## 2023-08-24 RX ADMIN — SODIUM CHLORIDE, PRESERVATIVE FREE 10 ML: 5 INJECTION INTRAVENOUS at 22:27

## 2023-08-24 RX ADMIN — HEPARIN SODIUM 5000 UNITS: 5000 INJECTION INTRAVENOUS; SUBCUTANEOUS at 15:03

## 2023-08-24 RX ADMIN — LEVOTHYROXINE SODIUM 137 MCG: 137 TABLET ORAL at 06:14

## 2023-08-24 RX ADMIN — OXYCODONE HYDROCHLORIDE 2.5 MG: 5 TABLET ORAL at 22:27

## 2023-08-24 RX ADMIN — BUDESONIDE AND FORMOTEROL FUMARATE DIHYDRATE 2 PUFF: 160; 4.5 AEROSOL RESPIRATORY (INHALATION) at 08:32

## 2023-08-24 RX ADMIN — HEPARIN SODIUM 5000 UNITS: 5000 INJECTION INTRAVENOUS; SUBCUTANEOUS at 06:14

## 2023-08-24 RX ADMIN — FUROSEMIDE 20 MG: 20 INJECTION, SOLUTION INTRAMUSCULAR; INTRAVENOUS at 11:06

## 2023-08-24 RX ADMIN — HEPARIN SODIUM 5000 UNITS: 5000 INJECTION INTRAVENOUS; SUBCUTANEOUS at 22:28

## 2023-08-24 RX ADMIN — BUDESONIDE AND FORMOTEROL FUMARATE DIHYDRATE 2 PUFF: 160; 4.5 AEROSOL RESPIRATORY (INHALATION) at 20:33

## 2023-08-24 RX ADMIN — SODIUM CHLORIDE, PRESERVATIVE FREE 10 ML: 5 INJECTION INTRAVENOUS at 09:14

## 2023-08-24 RX ADMIN — GEMFIBROZIL 600 MG: 600 TABLET ORAL at 09:13

## 2023-08-24 RX ADMIN — PANTOPRAZOLE SODIUM 40 MG: 40 TABLET, DELAYED RELEASE ORAL at 06:14

## 2023-08-24 ASSESSMENT — PAIN SCALES - GENERAL: PAINLEVEL_OUTOF10: 7

## 2023-08-24 ASSESSMENT — PAIN DESCRIPTION - LOCATION: LOCATION: BACK

## 2023-08-24 NOTE — PROGRESS NOTES
[facility] - Initial Consult Note   COVID 19 Patient  Today's Date and Time: 8/24/2023, 9:06 AM    Impression :     COVID 19 Confirmed Infection  Covid tests:  8/16/23: Symptoms started  8/17/23: Positive home test  8/19/23: detected at hospital  Vaccination status:  Fully vaccinated  Received [address] vaccines-Most recent was on 10/3/22  Retroperitoneal hematoma s/p fall at home  Hematoma of left iliopsoas muscle  Afib on Eliquis  DM II  Obesity  PCN allergy  Bactrim allergy    Recommendations:   Antibiotic treatment:  Monitor off antibiotics  Covid Rx:    Remdesivir: Not necessary  Decadron-Not indicated  Actemra-Not indicated  Paxlovid: Out of window  ID wise cleared for transfer to [address] [place] [address] Making/Summary/Discussion:8/24/2023     Patient admitted with retroperitoneal hematoma and COVID 19 infection    Infection Control Recommendations   [place] Precautions  Droplet plus isolation until 8/27/23    Antimicrobial Stewardship Recommendations     Discontinuation of therapy  Coordination of Outpatient Care:   Estimated Length of IV antimicrobials:TBD  Patient will need Midline Catheter Insertion: TBD  Patient will need PICC line Insertion: No  Patient will need: Home IV , [address] [place],  SNF,  LTAC:TBD  Patient will need outpatient wound care:No    Chief complaint/reason for consultation:   Concern for COVID infection      History of Present Illness:   Casa Ta is a 69y.o.-year-old  female who was initially admitted on 8/19/2023. Patient seen at the request of Dr. Sophie Orr:    This patient, who is on Eliquis for Afib, presented to [address] [place] [address] ED with complaints of left hip pain after falling at home the day prior. According to the patient, she began feeling ill last Wednesday the 16th and tested positive for Covid at home on the 17th.      Her doctor prescribed her a couple days of prednisone, but it flush  5-40 mL IntraVENous 2 times per day    polyethylene glycol  17 g Oral Daily       Social History:     Social History     Socioeconomic History    Marital status:      Spouse name: Not on file    Number of children: Not on file    Years of education: Not on file    Highest education level: Not on file   Occupational History    Not on file   Tobacco Use    Smoking status: Never    Smokeless tobacco: Never   Substance and Sexual Activity    Alcohol use: No     Comment: rare    Drug use: No    Sexual activity: Not on file   Other Topics Concern    Not on file   Social History Narrative    Not on file     Social Determinants of Health     Financial Resource Strain: Not on file   Food Insecurity: Not on file   Transportation Needs: Not on file   Physical Activity: Not on file   Stress: Not on file   Social Connections: Not on file   Intimate Partner Violence: Not on file   Housing Stability: Not on file       Family History:     Family History   Problem Relation Age of Onset    Diabetes Mother         Allergies:   Latex, Other, Adhesive tape, Bactrim [sulfamethoxazole-trimethoprim], Codeine, Iodine, Pcn [penicillins], and Morphine     Review of Systems:     Constitutional: No fevers. Some chills. Generalized malaise  Head: No headaches  Eyes: No double vision or blurry vision. No conjunctival inflammation. ENT: No sore throat or runny nose. . No hearing loss, tinnitus or vertigo. Cardiovascular: No chest pain or palpitations. No Shortness of breath. SAUNDERS  Lung: No Shortness of breath at rest. Frequent cough. sputum production  Abdomen: No nausea, vomiting, diarrhea, or abdominal pain. Kinza Brash No cramps. Genitourinary: No increased urinary frequency, or dysuria. No hematuria. No suprapubic or CVA pain  Musculoskeletal: No muscle aches or pains. No joint effusions, swelling or deformities  Hematologic: No bleeding or bruising. Neurologic: No headache, weakness, numbness, or tingling.   Integument: No rash, no

## 2023-08-24 NOTE — PLAN OF CARE
Problem: Discharge Planning  Goal: Discharge to home or other facility with appropriate resources  8/24/2023 0413 by Zackery Edwards RN  Outcome: Progressing  8/23/2023 1717 by Jase Gray RN  Outcome: Progressing     Problem: Safety - Adult  Goal: Free from fall injury  8/24/2023 0413 by Zackery Edwards RN  Outcome: Progressing  8/23/2023 1717 by Jase Gray RN  Outcome: Progressing     Problem: ABCDS Injury Assessment  Goal: Absence of physical injury  8/24/2023 0413 by Zackery Edwards RN  Outcome: Progressing  8/23/2023 1717 by Jase Gray RN  Outcome: Progressing     Problem: Skin/Tissue Integrity  Goal: Absence of new skin breakdown  Description: 1. Monitor for areas of redness and/or skin breakdown  2. Assess vascular access sites hourly  3. Every 4-6 hours minimum:  Change oxygen saturation probe site  4. Every 4-6 hours:  If on nasal continuous positive airway pressure, respiratory therapy assess nares and determine need for appliance change or resting period.   8/24/2023 0413 by Zackery Edwards RN  Outcome: Progressing  8/23/2023 1717 by Jase Gray RN  Outcome: Progressing     Problem: Pain  Goal: Verbalizes/displays adequate comfort level or baseline comfort level  8/24/2023 0413 by Zackery Edwards RN  Outcome: Progressing  8/23/2023 1717 by Jaes Gray RN  Outcome: Progressing     Problem: Respiratory - Adult  Goal: Achieves optimal ventilation and oxygenation  8/24/2023 0413 by Zackery Edwards RN  Outcome: Progressing  8/23/2023 2129 by Isauro Love RCP  Outcome: Progressing  8/23/2023 1717 by Jase Gray RN  Outcome: Progressing     Problem: Chronic Conditions and Co-morbidities  Goal: Patient's chronic conditions and co-morbidity symptoms are monitored and maintained or improved  8/24/2023 0413 by Zackery Edwards RN  Outcome: Progressing  8/23/2023 1717 by Jase Gray RN  Outcome: Progressing

## 2023-08-25 ENCOUNTER — APPOINTMENT (OUTPATIENT)
Dept: GENERAL RADIOLOGY | Age: 78
DRG: 393 | End: 2023-08-25
Payer: MEDICARE

## 2023-08-25 VITALS
BODY MASS INDEX: 45.73 KG/M2 | DIASTOLIC BLOOD PRESSURE: 75 MMHG | TEMPERATURE: 98 F | HEART RATE: 100 BPM | OXYGEN SATURATION: 98 % | HEIGHT: 65 IN | RESPIRATION RATE: 22 BRPM | WEIGHT: 274.47 LBS | SYSTOLIC BLOOD PRESSURE: 140 MMHG

## 2023-08-25 LAB
ANION GAP SERPL CALCULATED.3IONS-SCNC: 11 MMOL/L (ref 9–17)
BASOPHILS # BLD: <0.03 K/UL (ref 0–0.2)
BASOPHILS NFR BLD: 0 % (ref 0–2)
BNP SERPL-MCNC: 2886 PG/ML
BUN SERPL-MCNC: 24 MG/DL (ref 8–23)
CALCIUM SERPL-MCNC: 8.6 MG/DL (ref 8.6–10.4)
CHLORIDE SERPL-SCNC: 110 MMOL/L (ref 98–107)
CO2 SERPL-SCNC: 22 MMOL/L (ref 20–31)
CREAT SERPL-MCNC: 0.8 MG/DL (ref 0.5–0.9)
EOSINOPHIL # BLD: 0.64 K/UL (ref 0–0.44)
EOSINOPHILS RELATIVE PERCENT: 9 % (ref 1–4)
ERYTHROCYTE [DISTWIDTH] IN BLOOD BY AUTOMATED COUNT: 15 % (ref 11.8–14.4)
GFR SERPL CREATININE-BSD FRML MDRD: >60 ML/MIN/1.73M2
GLUCOSE SERPL-MCNC: 132 MG/DL (ref 70–99)
HCT VFR BLD AUTO: 26.4 % (ref 36.3–47.1)
HGB BLD-MCNC: 8.5 G/DL (ref 11.9–15.1)
IMM GRANULOCYTES # BLD AUTO: 0.06 K/UL (ref 0–0.3)
IMM GRANULOCYTES NFR BLD: 1 %
LYMPHOCYTES NFR BLD: 1.27 K/UL (ref 1.1–3.7)
LYMPHOCYTES RELATIVE PERCENT: 17 % (ref 24–43)
MAGNESIUM SERPL-MCNC: 1.7 MG/DL (ref 1.6–2.6)
MCH RBC QN AUTO: 29.1 PG (ref 25.2–33.5)
MCHC RBC AUTO-ENTMCNC: 32.2 G/DL (ref 28.4–34.8)
MCV RBC AUTO: 90.4 FL (ref 82.6–102.9)
MONOCYTES NFR BLD: 0.5 K/UL (ref 0.1–1.2)
MONOCYTES NFR BLD: 7 % (ref 3–12)
NEUTROPHILS NFR BLD: 66 % (ref 36–65)
NEUTS SEG NFR BLD: 4.91 K/UL (ref 1.5–8.1)
NRBC BLD-RTO: 0 PER 100 WBC
PLATELET # BLD AUTO: 277 K/UL (ref 138–453)
PMV BLD AUTO: 10.7 FL (ref 8.1–13.5)
POTASSIUM SERPL-SCNC: 4.1 MMOL/L (ref 3.7–5.3)
RBC # BLD AUTO: 2.92 M/UL (ref 3.95–5.11)
RBC # BLD: ABNORMAL 10*6/UL
SODIUM SERPL-SCNC: 143 MMOL/L (ref 135–144)
WBC OTHER # BLD: 7.4 K/UL (ref 3.5–11.3)

## 2023-08-25 PROCEDURE — 97530 THERAPEUTIC ACTIVITIES: CPT

## 2023-08-25 PROCEDURE — 6370000000 HC RX 637 (ALT 250 FOR IP): Performed by: NURSE PRACTITIONER

## 2023-08-25 PROCEDURE — 99231 SBSQ HOSP IP/OBS SF/LOW 25: CPT | Performed by: SURGERY

## 2023-08-25 PROCEDURE — 99232 SBSQ HOSP IP/OBS MODERATE 35: CPT | Performed by: INTERNAL MEDICINE

## 2023-08-25 PROCEDURE — 80048 BASIC METABOLIC PNL TOTAL CA: CPT

## 2023-08-25 PROCEDURE — 2580000003 HC RX 258

## 2023-08-25 PROCEDURE — 83880 ASSAY OF NATRIURETIC PEPTIDE: CPT

## 2023-08-25 PROCEDURE — 6370000000 HC RX 637 (ALT 250 FOR IP): Performed by: STUDENT IN AN ORGANIZED HEALTH CARE EDUCATION/TRAINING PROGRAM

## 2023-08-25 PROCEDURE — 6360000002 HC RX W HCPCS: Performed by: NURSE PRACTITIONER

## 2023-08-25 PROCEDURE — 83735 ASSAY OF MAGNESIUM: CPT

## 2023-08-25 PROCEDURE — 97110 THERAPEUTIC EXERCISES: CPT

## 2023-08-25 PROCEDURE — 71045 X-RAY EXAM CHEST 1 VIEW: CPT

## 2023-08-25 PROCEDURE — 99233 SBSQ HOSP IP/OBS HIGH 50: CPT | Performed by: SURGERY

## 2023-08-25 PROCEDURE — 36415 COLL VENOUS BLD VENIPUNCTURE: CPT

## 2023-08-25 PROCEDURE — 97116 GAIT TRAINING THERAPY: CPT

## 2023-08-25 PROCEDURE — 94760 N-INVAS EAR/PLS OXIMETRY 1: CPT

## 2023-08-25 PROCEDURE — 6370000000 HC RX 637 (ALT 250 FOR IP)

## 2023-08-25 PROCEDURE — 94640 AIRWAY INHALATION TREATMENT: CPT

## 2023-08-25 PROCEDURE — 85025 COMPLETE CBC W/AUTO DIFF WBC: CPT

## 2023-08-25 RX ORDER — OXYCODONE HYDROCHLORIDE 5 MG/1
2.5 TABLET ORAL EVERY 6 HOURS PRN
Refills: 0 | Status: SHIPPED | OUTPATIENT
Start: 2023-08-25 | End: 2023-09-01

## 2023-08-25 RX ORDER — DEXAMETHASONE 6 MG/1
6 TABLET ORAL DAILY
Qty: 6 TABLET | Refills: 0 | DISCHARGE
Start: 2023-08-25 | End: 2023-08-31

## 2023-08-25 RX ORDER — PANTOPRAZOLE SODIUM 40 MG/1
40 TABLET, DELAYED RELEASE ORAL
Refills: 0 | DISCHARGE
Start: 2023-08-26 | End: 2023-09-09

## 2023-08-25 RX ADMIN — LEVOTHYROXINE SODIUM 137 MCG: 137 TABLET ORAL at 06:22

## 2023-08-25 RX ADMIN — SODIUM CHLORIDE, PRESERVATIVE FREE 10 ML: 5 INJECTION INTRAVENOUS at 09:40

## 2023-08-25 RX ADMIN — GEMFIBROZIL 600 MG: 600 TABLET ORAL at 09:40

## 2023-08-25 RX ADMIN — METOPROLOL TARTRATE 25 MG: 50 TABLET, FILM COATED ORAL at 09:40

## 2023-08-25 RX ADMIN — HEPARIN SODIUM 5000 UNITS: 5000 INJECTION INTRAVENOUS; SUBCUTANEOUS at 06:23

## 2023-08-25 RX ADMIN — PANTOPRAZOLE SODIUM 40 MG: 40 TABLET, DELAYED RELEASE ORAL at 06:22

## 2023-08-25 RX ADMIN — BUDESONIDE AND FORMOTEROL FUMARATE DIHYDRATE 2 PUFF: 160; 4.5 AEROSOL RESPIRATORY (INHALATION) at 08:07

## 2023-08-25 NOTE — PLAN OF CARE
Problem: Discharge Planning  Goal: Discharge to home or other facility with appropriate resources  8/25/2023 0046 by Ender Joaquin RN  Outcome: Progressing  8/25/2023 0045 by Ender Joaquin RN  Outcome: Progressing  Flowsheets (Taken 8/24/2023 2000)  Discharge to home or other facility with appropriate resources: Identify barriers to discharge with patient and caregiver     Problem: Safety - Adult  Goal: Free from fall injury  8/25/2023 0046 by Ender Joaquin RN  Outcome: Progressing  8/25/2023 0045 by Ender Joaquin RN  Outcome: Progressing     Problem: ABCDS Injury Assessment  Goal: Absence of physical injury  8/25/2023 0046 by Ender Joaquin RN  Outcome: Progressing  8/25/2023 0045 by Ender Joaquin RN  Outcome: Progressing     Problem: Skin/Tissue Integrity  Goal: Absence of new skin breakdown  Description: 1. Monitor for areas of redness and/or skin breakdown  2. Assess vascular access sites hourly  3. Every 4-6 hours minimum:  Change oxygen saturation probe site  4. Every 4-6 hours:  If on nasal continuous positive airway pressure, respiratory therapy assess nares and determine need for appliance change or resting period.   8/25/2023 0046 by Ender Joaquin RN  Outcome: Progressing  8/25/2023 0045 by Ender Joaquin RN  Outcome: Progressing     Problem: Pain  Goal: Verbalizes/displays adequate comfort level or baseline comfort level  8/25/2023 0046 by Ender Joaquin RN  Outcome: Progressing  8/25/2023 0045 by Ender Joaquin RN  Outcome: Progressing     Problem: Respiratory - Adult  Goal: Achieves optimal ventilation and oxygenation  8/25/2023 0046 by Ender Joaquin RN  Outcome: Progressing  8/25/2023 0045 by Ender Joaquin RN  Outcome: Progressing     Problem: Chronic Conditions and Co-morbidities  Goal: Patient's chronic conditions and co-morbidity symptoms are monitored and maintained or improved  8/25/2023 0046 by Ender Joaquin RN  Outcome: Progressing  8/25/2023 0045 by Ender Joaquin RN  Outcome:

## 2023-08-25 NOTE — PLAN OF CARE
Problem: Discharge Planning  Goal: Discharge to home or other facility with appropriate resources  Outcome: Progressing  Flowsheets (Taken 8/24/2023 2000)  Discharge to home or other facility with appropriate resources: Identify barriers to discharge with patient and caregiver     Problem: Safety - Adult  Goal: Free from fall injury  Outcome: Progressing     Problem: ABCDS Injury Assessment  Goal: Absence of physical injury  Outcome: Progressing     Problem: Skin/Tissue Integrity  Goal: Absence of new skin breakdown  Description: 1. Monitor for areas of redness and/or skin breakdown  2. Assess vascular access sites hourly  3. Every 4-6 hours minimum:  Change oxygen saturation probe site  4. Every 4-6 hours:  If on nasal continuous positive airway pressure, respiratory therapy assess nares and determine need for appliance change or resting period.   Outcome: Progressing     Problem: Pain  Goal: Verbalizes/displays adequate comfort level or baseline comfort level  Outcome: Progressing     Problem: Respiratory - Adult  Goal: Achieves optimal ventilation and oxygenation  Outcome: Progressing     Problem: Chronic Conditions and Co-morbidities  Goal: Patient's chronic conditions and co-morbidity symptoms are monitored and maintained or improved  Outcome: Progressing

## 2023-08-25 NOTE — PLAN OF CARE
Problem: Respiratory - Adult  Goal: Achieves optimal ventilation and oxygenation  8/25/2023 0812 by Sarita Jeans, RCP  Outcome: Progressing   BRONCHOSPASM/BRONCHOCONSTRICTION     [x]         IMPROVE AERATION/BREATH SOUNDS  [x]   ADMINISTER BRONCHODILATOR THERAPY AS APPROPRIATE  [x]   ASSESS BREATH SOUNDS  [x]   IMPLEMENT AEROSOL/MDI PROTOCOL  [x]   PATIENT EDUCATION AS NEEDED   PROVIDE ADEQUATE OXYGENATION WITH ACCEPTABLE SP02/ABG'S

## 2023-08-25 NOTE — PROGRESS NOTES
PROGRESS NOTE        PATIENT NAME: Juancarlos [facility] RECORD NO. [id number]  DATE: 8/25/2023    HD: # 6      Patient Active Problem List   Diagnosis    Renal calculus, bilateral    Type II or unspecified type diabetes mellitus without mention of complication, not stated as uncontrolled    Female stress incontinence    Other nonspecific finding on examination of urine    Hypocitraturia    Retroperitoneal hematoma    Hematoma of left iliopsoas muscle    Syncope    Idiopathic hypotension    COVID-19    Atrial fibrillation, chronic (HCC)    Type 2 diabetes mellitus with hyperglycemia (HCC)    Class 3 severe obesity due to excess calories with serious comorbidity and body mass index (BMI) of 45.0 to 49.9 in adult [facility])       DIAGNOSIS AND PLAN  L iliopsoas and retroperitoneal hematoma  Anemia   Hemoglobin stable   Hold Eliquis until 8/29     PIOTR  Positive orthostatic hypotension   IVF bolus given 8/22   Creatinine improving   Continue to monitor intake/output   Will order ABIDA hose       Fluid status  Lasix 20mg yesterday  BNP 2,886 from  [address]   In isolation   Patient states she testing positive on 9/17 but did have symptoms prior to that date. On singulair and symbicort   ID consulted, recommend isolation until 8/27    AFib   Rate controlled   Continue metoprolol, dose decreased to 25mg BID per cardiology d/t orthostatics    DVT proph   Continue heparin SC    GI   On protonix   Continue general diet    Pain management   Continue PRN tylenol and kianna    Hypothyroidism   Continue home synthroid    Dispo   Medically stable for discharge   Possible transport today to [address] [place] [address]    Patient seen and examined at bedside. Patient states she is feeling better. She is breathing easier today. Patient states she has numbness to her anterior left thigh but can feel below her knee. States she does have pain to her left flank region.     OBJECTIVE  VITALS:   Vitals:    08/25/23 0445   BP: (!)

## 2023-08-25 NOTE — CARE COORDINATION
Notified Oswaldo Ortega from [facility] that patient will be discharged today. Met with patient with Dr Poppy Quintero and Juli Grant RN. Discussed that patient is medically stable for stable and that [facility] is able to accept patient today. Patient agreeable. She does not have anyone that can transport her to facility. Transportation request faxed to [address]. JACQUIE completed    [phone] notified by Lashae from [facility] that patient is scheduled for 1:30 PM. Patient notified and agreeable. Voicemail left for Oswaldo Ortega from [facility] to notify of discharge. Requested for Terri Manzano NP to sign [employer].  She relates that it has been e-scribed to the SNF    Discharge [address] Case Management Department  Written by: Abbe Leonard RN    Patient Name: Jignesh Mariano  Attending Provider: Jaja Sullivan MD  Admit Date: 2023  6:18 PM  MRN: [id number]  Account: [de-identified]                     : 1945  Discharge Date: 2023      Disposition: SNF    Abbe Leonard RN

## 2023-08-25 NOTE — PROGRESS NOTES
[facility] - Initial Consult Note   COVID 19 Patient  Today's Date and Time: 8/25/2023, 9:29 AM    Impression :     COVID 19 Confirmed Infection  Covid tests:  8/16/23: Symptoms started  8/17/23: Positive home test  8/19/23: detected at hospital  Vaccination status:  Fully vaccinated  Received [employer] vaccines-Most recent was on 10/3/22  Retroperitoneal hematoma s/p fall at home  Hematoma of left iliopsoas muscle  Afib on Eliquis  DM II  Obesity  PCN allergy  Bactrim allergy    Recommendations:   Antibiotic treatment:  Monitor off antibiotics  Covid Rx:    Remdesivir: Not necessary  Decadron: Started 8-25-23 @ 6 mg po daily x 6 days  Actemra-Not indicated  Paxlovid: Out of window  ID wise cleared for transfer to [address] [place] [address] Making/Summary/Discussion:8/25/2023     Patient admitted with retroperitoneal hematoma and COVID 19 infection    Infection Control Recommendations   [place] Precautions  Droplet plus isolation until 8/27/23    Antimicrobial Stewardship Recommendations     Discontinuation of therapy  Coordination of Outpatient Care:   Estimated Length of IV antimicrobials:TBD  Patient will need Midline Catheter Insertion: TBD  Patient will need PICC line Insertion: No  Patient will need: Home IV , [address] [facility],  SNF,  LTAC:TBD  Patient will need outpatient wound care:No    Chief complaint/reason for consultation:   Concern for COVID infection      History of Present Illness:   Bartolo Epley is a 69y.o.-year-old  female who was initially admitted on 8/19/2023. Patient seen at the request of Dr. Noelia Lennox:    This patient, who is on Eliquis for Afib, presented to [address] [place] [address] ED with complaints of left hip pain after falling at home the day prior. According to the patient, she began feeling ill last Wednesday the 16th and tested positive for Covid at home on the 17th.      Her doctor prescribed her a couple results for input(s): PROT, LABALBU, BILIDIR, IBILI, BILITOT, ALKPHOS, ALT, AST in the last 72 hours. No results for input(s): RPR in the last 72 hours. No results for input(s): HIV in the last 72 hours. No results for input(s): BC in the last 72 hours. Lab Results   Component Value Date/Time    RBC 2.92 08/25/2023 07:43 AM    WBC 7.4 08/25/2023 07:43 AM     Lab Results   Component Value Date/Time    CREATININE 0.8 08/25/2023 07:43 AM    GLUCOSE 132 08/25/2023 07:43 AM       Medical Decision Making-Imaging:       Medical Decision Nooaqg-Tatvlavw-Ocnum:       Medical Decision Making-Other:     Note:      Thank you for allowing us to participate in the care of this patient. Please call with questions. Liz Martinez MD            ATTESTATION:    I have discussed the case, including pertinent history and exam findings with the medical resident. I have evaluated the  History, physical findings and pictures of the patient and the key elements of the encounter have been performed by me. I have reviewed the laboratory data, other diagnostic studies and discussed them with the medical resident. I have updated the medical record where necessary. I agree with the assessment, plan and orders as documented by the medical resident and I have modified them as necessary. Elements of Medical Decision Making:  Note: I have independently performed the steps listed below as part of the medical decision making and evaluation. Examined and discussed with patient. Labs, medications, radiologic studies were reviewed with personal review of films  Large amounts of data were reviewed  Discussed with nursing Staff, Discharge planner  Infection Control and Prevention measures reviewed  All prior entries were reviewed  Administer medications as ordered  Prognosis: Guarded  Discharge planning reviewed  Follow up as outpatient.     Manav Martin MD.

## 2023-08-25 NOTE — PROGRESS NOTES
Physical Therapy  Facility/Department: [facility] CAR 2- STEPDOWN  Physical Therapy     Name: Eder Whitlock  : 1945  MRN: [id number]  Date of Service: 2023    Discharge Recommendations:  Patient would benefit from continued therapy after discharge          Patient Diagnosis(es): The primary encounter diagnosis was Hematoma of left iliopsoas muscle, initial encounter. A diagnosis of COVID-19 virus infection was also pertinent to this visit. Past Medical History:  has a past medical history of Allergic rhinitis, Arthritis, Borderline diabetes, Caffeine use, COPD (chronic obstructive pulmonary disease) ([address]), GERD (gastroesophageal reflux disease), Kidney stones, Mumps, MVP (mitral valve prolapse), Nausea & vomiting, Palpitations, PONV (postoperative nausea and vomiting), Ringing in ears, Schamberg's disease, Thoracic outlet syndrome, Type II or unspecified type diabetes mellitus without mention of complication, not stated as uncontrolled, Under care of team, Under care of team, Under care of team, and Under care of team.  Past Surgical History:  has a past surgical history that includes Hysterectomy; Appendectomy; Tonsillectomy; Knee arthroscopy (Bilateral); Carpal tunnel release (Bilateral); Cervical spine surgery; back surgery; vascular surgery; Finger trigger release (Bilateral); Achilles tendon surgery (Bilateral); Diagnostic Cardiac Cath Lab Procedure; Lithotripsy; angioplasty; Cholecystectomy; Colonoscopy; Stomach surgery; Lithotripsy; Cystocopy; Thyroidectomy; parathyroidectomy; Breast biopsy; Endoscopy, colon, diagnostic; eye surgery (Bilateral); joint replacement (Bilateral); Cystoscopy (Left, 2021); and Ureter surgery (Left, 2021). Assessment   Body Structures, Functions, Activity Limitations Requiring Skilled Therapeutic Intervention: Decreased functional mobility ; Decreased ROM; Decreased balance; Increased pain;Decreased posture;Decreased endurance;Decreased strength;Decreased safe

## 2023-08-28 NOTE — DISCHARGE SUMMARY
DISCHARGE SUMMARY:    PATIENT NAME:  Pascual Wolff  YOB: 1945  MEDICAL RECORD NO. [id number]  DATE: 08/28/23  PRIMARY CARE PHYSICIAN: Vinnie Rome MD  ADMIT DATE:  8/19/2023    DISCHARGE DATE:  8/25/2023  DISPOSITION:  facility  ADMITTING DIAGNOSIS:   Fall from standing with acute left iliopsoas and retroperitoneal hematomas    DIAGNOSIS:   Patient Active Problem List   Diagnosis    Renal calculus, bilateral    Type II or unspecified type diabetes mellitus without mention of complication, not stated as uncontrolled    Female stress incontinence    Other nonspecific finding on examination of urine    Hypocitraturia    Retroperitoneal hematoma    Hematoma of left iliopsoas muscle    Syncope    Idiopathic hypotension    COVID-19    Atrial fibrillation, chronic (HCC)    Type 2 diabetes mellitus with hyperglycemia (HCC)    Class 3 severe obesity due to excess calories with serious comorbidity and body mass index (BMI) of 45.0 to 49.9 in [facility])       CONSULTANTS:  ID, cardiology    PROCEDURES:   none    HOSPITAL COURSE:   Pascual Wolff is a 69 y.o. female who was admitted on 8/19/2023  Hospital Course: This patient, who is on Eliquis for Afib, presented to [address] [place] [address] ED with complaints of left hip pain after falling at home the day prior. According to the patient, she began feeling ill last Wednesday the 16th and tested positive for Covid at home on the 17th. Her doctor prescribed her a couple days of prednisone, but it did not help. She was experiencing a productive cough and generalized malaise, but was not experiencing fever, N/V/D or shortness of breath. She was having chills. In the ED, her vital signs were stable no room air and she was afebrile. A rapid Covid test was positive. CT imaging revealed acute left iliopsoas and retroperitoneal hematomas.    She was also noted to have a few patchy irregular wedge-shaped ground-glass densities in both lungs, likely

## 2023-10-02 ENCOUNTER — HOSPITAL ENCOUNTER (OUTPATIENT)
Age: 78
Setting detail: SPECIMEN
Discharge: HOME | End: 2023-10-02
Payer: MEDICARE

## 2023-10-02 LAB
BACTERIA URNS QL MICRO: ABNORMAL
BASOPHILS # BLD: 0.05 K/UL (ref 0–0.2)
BASOPHILS NFR BLD: 1 % (ref 0–2)
BILIRUB UR QL STRIP: ABNORMAL
CLARITY UR: ABNORMAL
COLOR UR: YELLOW
EOSINOPHIL # BLD: 0.41 K/UL (ref 0–0.44)
EOSINOPHILS RELATIVE PERCENT: 5 % (ref 1–4)
EPI CELLS #/AREA URNS HPF: ABNORMAL /HPF (ref 0–5)
ERYTHROCYTE [DISTWIDTH] IN BLOOD BY AUTOMATED COUNT: 14.6 % (ref 11.8–14.4)
GLUCOSE UR STRIP-MCNC: NEGATIVE MG/DL
HCT VFR BLD AUTO: 35.7 % (ref 36.3–47.1)
HGB BLD-MCNC: 10.7 G/DL (ref 11.9–15.1)
HGB UR QL STRIP.AUTO: ABNORMAL
IMM GRANULOCYTES # BLD AUTO: 0.03 K/UL (ref 0–0.3)
IMM GRANULOCYTES NFR BLD: 0 %
KETONES UR STRIP-MCNC: NEGATIVE MG/DL
LEUKOCYTE ESTERASE UR QL STRIP: ABNORMAL
LYMPHOCYTES NFR BLD: 1.03 K/UL (ref 1.1–3.7)
LYMPHOCYTES RELATIVE PERCENT: 14 % (ref 24–43)
MCH RBC QN AUTO: 29 PG (ref 25.2–33.5)
MCHC RBC AUTO-ENTMCNC: 30 G/DL (ref 28.4–34.8)
MCV RBC AUTO: 96.7 FL (ref 82.6–102.9)
MONOCYTES NFR BLD: 0.43 K/UL (ref 0.1–1.2)
MONOCYTES NFR BLD: 6 % (ref 3–12)
NEUTROPHILS NFR BLD: 74 % (ref 36–65)
NEUTS SEG NFR BLD: 5.62 K/UL (ref 1.5–8.1)
NITRITE UR QL STRIP: POSITIVE
NRBC BLD-RTO: 0 PER 100 WBC
PH UR STRIP: 6 [PH] (ref 5–8)
PLATELET # BLD AUTO: 332 K/UL (ref 138–453)
PMV BLD AUTO: 10.5 FL (ref 8.1–13.5)
PROT UR STRIP-MCNC: ABNORMAL MG/DL
RBC # BLD AUTO: 3.69 M/UL (ref 3.95–5.11)
RBC # BLD: ABNORMAL 10*6/UL
RBC #/AREA URNS HPF: ABNORMAL /HPF (ref 0–2)
SP GR UR STRIP: 1.03 (ref 1–1.03)
UROBILINOGEN UR STRIP-ACNC: NORMAL EU/DL (ref 0–1)
WBC #/AREA URNS HPF: ABNORMAL /HPF (ref 0–5)
WBC OTHER # BLD: 7.6 K/UL (ref 3.5–11.3)

## 2023-10-02 PROCEDURE — 87077 CULTURE AEROBIC IDENTIFY: CPT

## 2023-10-02 PROCEDURE — 85025 COMPLETE CBC W/AUTO DIFF WBC: CPT

## 2023-10-02 PROCEDURE — 87086 URINE CULTURE/COLONY COUNT: CPT

## 2023-10-02 PROCEDURE — 87186 SC STD MICRODIL/AGAR DIL: CPT

## 2023-10-02 PROCEDURE — 81001 URINALYSIS AUTO W/SCOPE: CPT

## 2023-10-04 LAB
MICROORGANISM SPEC CULT: ABNORMAL
MICROORGANISM SPEC CULT: ABNORMAL
SPECIMEN DESCRIPTION: ABNORMAL

## 2023-10-18 ENCOUNTER — HOSPITAL ENCOUNTER (OUTPATIENT)
Age: 78
Setting detail: SPECIMEN
Discharge: HOME | End: 2023-10-18
Payer: MEDICARE

## 2023-10-18 LAB
ALBUMIN SERPL-MCNC: 3.6 G/DL (ref 3.5–5.2)
ALP SERPL-CCNC: 121 U/L (ref 35–104)
ALT SERPL-CCNC: 16 U/L (ref 5–33)
ANION GAP SERPL CALCULATED.3IONS-SCNC: 12 MMOL/L (ref 9–17)
AST SERPL-CCNC: 27 U/L
BILIRUB SERPL-MCNC: 0.3 MG/DL (ref 0.3–1.2)
BUN SERPL-MCNC: 32 MG/DL (ref 8–23)
BUN/CREAT SERPL: 23 (ref 9–20)
CALCIUM SERPL-MCNC: 9.6 MG/DL (ref 8.6–10.4)
CHLORIDE SERPL-SCNC: 105 MMOL/L (ref 98–107)
CO2 SERPL-SCNC: 22 MMOL/L (ref 20–31)
CREAT SERPL-MCNC: 1.4 MG/DL (ref 0.5–0.9)
GFR SERPL CREATININE-BSD FRML MDRD: 39 ML/MIN/1.73M2
GLUCOSE SERPL-MCNC: 85 MG/DL (ref 70–99)
POTASSIUM SERPL-SCNC: 5.3 MMOL/L (ref 3.7–5.3)
PROT SERPL-MCNC: 6.5 G/DL (ref 6.4–8.3)
SODIUM SERPL-SCNC: 139 MMOL/L (ref 135–144)

## 2023-10-18 PROCEDURE — 80053 COMPREHEN METABOLIC PANEL: CPT

## 2023-10-24 ENCOUNTER — HOSPITAL ENCOUNTER (OUTPATIENT)
Age: 78
Setting detail: SPECIMEN
Discharge: HOME | End: 2023-10-24

## 2023-10-24 LAB
BASOPHILS # BLD: 0.11 K/UL (ref 0–0.2)
BASOPHILS NFR BLD: 1 % (ref 0–2)
EOSINOPHIL # BLD: 0.33 K/UL (ref 0–0.44)
EOSINOPHILS RELATIVE PERCENT: 4 % (ref 1–4)
ERYTHROCYTE [DISTWIDTH] IN BLOOD BY AUTOMATED COUNT: 15.8 % (ref 11.8–14.4)
HCT VFR BLD AUTO: 39.1 % (ref 36.3–47.1)
HGB BLD-MCNC: 11.4 G/DL (ref 11.9–15.1)
IMM GRANULOCYTES # BLD AUTO: 0.05 K/UL (ref 0–0.3)
IMM GRANULOCYTES NFR BLD: 1 %
LYMPHOCYTES NFR BLD: 1.23 K/UL (ref 1.1–3.7)
LYMPHOCYTES RELATIVE PERCENT: 16 % (ref 24–43)
MCH RBC QN AUTO: 28.6 PG (ref 25.2–33.5)
MCHC RBC AUTO-ENTMCNC: 29.2 G/DL (ref 28.4–34.8)
MCV RBC AUTO: 98 FL (ref 82.6–102.9)
MONOCYTES NFR BLD: 0.37 K/UL (ref 0.1–1.2)
MONOCYTES NFR BLD: 5 % (ref 3–12)
NEUTROPHILS NFR BLD: 73 % (ref 36–65)
NEUTS SEG NFR BLD: 5.5 K/UL (ref 1.5–8.1)
NRBC BLD-RTO: 0 PER 100 WBC
PLATELET # BLD AUTO: 504 K/UL (ref 138–453)
PMV BLD AUTO: 10.8 FL (ref 8.1–13.5)
RBC # BLD AUTO: 3.99 M/UL (ref 3.95–5.11)
RBC # BLD: ABNORMAL 10*6/UL
WBC OTHER # BLD: 7.6 K/UL (ref 3.5–11.3)

## 2023-12-03 PROBLEM — I48.0 PAF (PAROXYSMAL ATRIAL FIBRILLATION) (HCC): Status: ACTIVE | Noted: 2023-12-03

## 2024-01-12 PROBLEM — I89.0 LYMPHEDEMA: Status: ACTIVE | Noted: 2024-01-12

## 2024-03-11 NOTE — DISCHARGE INSTRUCTIONS
Summary  [x]Comprehensive Discharge Instruction      Patient signature______________________________________Date:________  Electronically signed by Sarah Petit RN on 3/13/2024 at 1:29 PM      Electronically signed by REMA Carroll CNP on 3/13/2024 at 1:22 PM

## 2024-03-13 ENCOUNTER — HOSPITAL ENCOUNTER (OUTPATIENT)
Dept: WOUND CARE | Age: 79
Discharge: HOME | End: 2024-03-13
Payer: MEDICARE

## 2024-03-13 VITALS
RESPIRATION RATE: 16 BRPM | HEART RATE: 59 BPM | BODY MASS INDEX: 44.93 KG/M2 | TEMPERATURE: 97.2 F | WEIGHT: 270 LBS | DIASTOLIC BLOOD PRESSURE: 55 MMHG | SYSTOLIC BLOOD PRESSURE: 96 MMHG

## 2024-03-13 DIAGNOSIS — E66.01 CLASS 3 SEVERE OBESITY DUE TO EXCESS CALORIES WITH SERIOUS COMORBIDITY AND BODY MASS INDEX (BMI) OF 45.0 TO 49.9 IN ADULT (HCC): ICD-10-CM

## 2024-03-13 DIAGNOSIS — I83.023 VENOUS STASIS ULCER OF LEFT ANKLE WITH FAT LAYER EXPOSED WITH VARICOSE VEINS (HCC): ICD-10-CM

## 2024-03-13 DIAGNOSIS — M79.661 PAIN IN BOTH LOWER LEGS: ICD-10-CM

## 2024-03-13 DIAGNOSIS — I83.013 VENOUS STASIS ULCER OF RIGHT ANKLE WITH FAT LAYER EXPOSED WITH VARICOSE VEINS (HCC): Primary | ICD-10-CM

## 2024-03-13 DIAGNOSIS — L97.312 VENOUS STASIS ULCER OF RIGHT ANKLE WITH FAT LAYER EXPOSED WITH VARICOSE VEINS (HCC): Primary | ICD-10-CM

## 2024-03-13 DIAGNOSIS — M79.662 PAIN IN BOTH LOWER LEGS: ICD-10-CM

## 2024-03-13 DIAGNOSIS — E11.65 TYPE 2 DIABETES MELLITUS WITH HYPERGLYCEMIA, WITHOUT LONG-TERM CURRENT USE OF INSULIN (HCC): ICD-10-CM

## 2024-03-13 DIAGNOSIS — I89.0 LYMPHEDEMA OF BOTH LOWER EXTREMITIES: ICD-10-CM

## 2024-03-13 DIAGNOSIS — L97.322 VENOUS STASIS ULCER OF LEFT ANKLE WITH FAT LAYER EXPOSED WITH VARICOSE VEINS (HCC): ICD-10-CM

## 2024-03-13 PROCEDURE — 11045 DBRDMT SUBQ TISS EACH ADDL: CPT

## 2024-03-13 PROCEDURE — 11042 DBRDMT SUBQ TIS 1ST 20SQCM/<: CPT

## 2024-03-13 PROCEDURE — 99214 OFFICE O/P EST MOD 30 MIN: CPT

## 2024-03-13 PROCEDURE — 99203 OFFICE O/P NEW LOW 30 MIN: CPT

## 2024-03-13 RX ORDER — LIDOCAINE HYDROCHLORIDE 20 MG/ML
JELLY TOPICAL ONCE
OUTPATIENT
Start: 2024-03-13 | End: 2024-03-13

## 2024-03-13 RX ORDER — GENTAMICIN SULFATE 1 MG/G
OINTMENT TOPICAL ONCE
OUTPATIENT
Start: 2024-03-13 | End: 2024-03-13

## 2024-03-13 RX ORDER — BETAMETHASONE DIPROPIONATE 0.5 MG/G
CREAM TOPICAL ONCE
OUTPATIENT
Start: 2024-03-13 | End: 2024-03-13

## 2024-03-13 RX ORDER — LIDOCAINE 50 MG/G
OINTMENT TOPICAL ONCE
OUTPATIENT
Start: 2024-03-13 | End: 2024-03-13

## 2024-03-13 RX ORDER — CLOBETASOL PROPIONATE 0.5 MG/G
OINTMENT TOPICAL ONCE
OUTPATIENT
Start: 2024-03-13 | End: 2024-03-13

## 2024-03-13 RX ORDER — BACITRACIN ZINC AND POLYMYXIN B SULFATE 500; 1000 [USP'U]/G; [USP'U]/G
OINTMENT TOPICAL ONCE
OUTPATIENT
Start: 2024-03-13 | End: 2024-03-13

## 2024-03-13 RX ORDER — LIDOCAINE HYDROCHLORIDE 40 MG/ML
SOLUTION TOPICAL ONCE
OUTPATIENT
Start: 2024-03-13 | End: 2024-03-13

## 2024-03-13 RX ORDER — GINSENG 100 MG
CAPSULE ORAL ONCE
OUTPATIENT
Start: 2024-03-13 | End: 2024-03-13

## 2024-03-13 RX ORDER — LIDOCAINE HYDROCHLORIDE 20 MG/ML
JELLY TOPICAL ONCE
Status: COMPLETED | OUTPATIENT
Start: 2024-03-13 | End: 2024-03-13

## 2024-03-13 RX ORDER — SODIUM CHLOR/HYPOCHLOROUS ACID 0.033 %
SOLUTION, IRRIGATION IRRIGATION ONCE
OUTPATIENT
Start: 2024-03-13 | End: 2024-03-13

## 2024-03-13 RX ORDER — TRIAMCINOLONE ACETONIDE 1 MG/G
OINTMENT TOPICAL ONCE
OUTPATIENT
Start: 2024-03-13 | End: 2024-03-13

## 2024-03-13 RX ORDER — IBUPROFEN 200 MG
TABLET ORAL ONCE
OUTPATIENT
Start: 2024-03-13 | End: 2024-03-13

## 2024-03-13 RX ORDER — LIDOCAINE 40 MG/G
CREAM TOPICAL ONCE
OUTPATIENT
Start: 2024-03-13 | End: 2024-03-13

## 2024-03-13 RX ADMIN — LIDOCAINE HYDROCHLORIDE 6 ML: 20 JELLY TOPICAL at 13:30

## 2024-03-13 NOTE — PROGRESS NOTES
[facility]   Progress Note and Procedure Note      Theresa Crow  MEDICAL RECORD NUMBER:  [id number]  AGE: 79 y.o.   GENDER: female  : 1945  EPISODE DATE:  3/13/2024    Subjective:     Chief Complaint   Patient presents with    Wound Check     legs         HISTORY of PRESENT ILLNESS HPI     Theresa Crow is a 79 y.o. female who presents today for wound/ulcer evaluation.     History of Wound Context: bilateral lower leg ulcerations. Patient has had open wounds to legs off and on since last summer when she had a hospitalization for traumatic fall and had Covid.      Wound/Ulcer Pain Timing/Severity: constant  Quality of pain: aching, burning  Severity:  2 / 10   Modifying Factors: Pain is relieved/improved with rest  Associated Signs/Symptoms: edema and erythema    Wound/Ulcer Identification:  Ulcer Type: venous  Contributing Factors: edema, venous stasis, diabetes, and obesity          PAST MEDICAL HISTORY        Diagnosis Date    Allergic rhinitis     Arthritis     Generalized    Borderline diabetes     Caffeine use     2 coffee day    COPD (chronic obstructive pulmonary disease) (HCC)     EARLY STAGE    GERD (gastroesophageal reflux disease)     Kidney stones     Mumps     MVP (mitral valve prolapse)     Nausea & vomiting     Palpitations     PONV (postoperative nausea and vomiting)     Ringing in ears     Schamberg's disease     Thoracic outlet syndrome     Type II or unspecified type diabetes mellitus without mention of complication, not stated as uncontrolled     Borderline, no meds    Under care of team 2021    PCP: Janette Uribe, last visit 2021    Under care of team 2021    Cardiologist: Janette Melchor, last visit 2021    Under care of team 2021    Urology: Dr. Turner, last visit 2021    Under care of team 2021    Endo: Dr. Pelletier, [facility], last visit 2021       PAST SURGICAL HISTORY    Past

## 2024-03-18 NOTE — DISCHARGE INSTRUCTIONS
[facility] -Phone: [phone] Fax: [phone]    Visit  Discharge Instructions / Physician Orders     DATE: 3/20/2024     Home Care: [facility]     SUPPLIES ORDERED THRU: NA     Wound Location: left and right lower leg     Cleanse with: Liquid antibacterial soap and water, rinse well      Dressing Orders: fibracol, silvercel, optilock, calamine layer, 3 layer wrap     Frequency: keep dry and intact     Additional Orders: Increase protein to diet (meat, cheese, eggs, fish, peanut butter, nuts and beans)  ELEVATE LEGS AS MUCH AS POSSIBLE     3/13/24 vascular studies ordered  Vascular Studies have been ordered, Please Call [phone] to schedule an appointment at your earliest convenience.         Your next appointment with Wound Care Center is in 1 week     (Please note your next appointment above and if you are unable to keep, kindly give a 24 hour notice. Thank you.)  If more than 15 min late we cannot guarantee you will be seen due to clinician schedule  Per Policy, Excessive cancellation will call for dismissal from program.     If you experience any of the following, please call the Wound Care Center during business hours:  [phone]  Your Phone call may be forwarded to [facility] during business hours that [place] is closed.     * Increase in Pain  * Temperature over 101  * Increase in drainage from your wound  * Drainage with a foul odor  * Bleeding  * Increase in swelling  * Need for compression bandage changes due to slippage, breakthrough drainage.     If you need medical attention outside of the business hours of the Wound Care Centers please contact your PCP or go to the nearest emergency room.     The information contained in the After Visit Summary has been reviewed with me, the patient and/or responsible adult, by my health care provider(s). I had the opportunity to ask questions regarding this information. I have elected to receive;      []After Visit

## 2024-03-20 ENCOUNTER — HOSPITAL ENCOUNTER (OUTPATIENT)
Dept: WOUND CARE | Age: 79
Discharge: HOME | End: 2024-03-20
Payer: MEDICARE

## 2024-03-20 VITALS
DIASTOLIC BLOOD PRESSURE: 56 MMHG | HEART RATE: 63 BPM | TEMPERATURE: 96.8 F | BODY MASS INDEX: 44.98 KG/M2 | SYSTOLIC BLOOD PRESSURE: 135 MMHG | WEIGHT: 270 LBS | HEIGHT: 65 IN | RESPIRATION RATE: 18 BRPM

## 2024-03-20 DIAGNOSIS — L97.322 VENOUS STASIS ULCER OF LEFT ANKLE WITH FAT LAYER EXPOSED WITH VARICOSE VEINS (HCC): ICD-10-CM

## 2024-03-20 DIAGNOSIS — I83.023 VENOUS STASIS ULCER OF LEFT ANKLE WITH FAT LAYER EXPOSED WITH VARICOSE VEINS (HCC): ICD-10-CM

## 2024-03-20 DIAGNOSIS — I83.013 VENOUS STASIS ULCER OF RIGHT ANKLE WITH FAT LAYER EXPOSED WITH VARICOSE VEINS (HCC): ICD-10-CM

## 2024-03-20 DIAGNOSIS — L97.312 VENOUS STASIS ULCER OF RIGHT ANKLE WITH FAT LAYER EXPOSED WITH VARICOSE VEINS (HCC): ICD-10-CM

## 2024-03-20 DIAGNOSIS — I89.0 LYMPHEDEMA OF BOTH LOWER EXTREMITIES: ICD-10-CM

## 2024-03-20 DIAGNOSIS — E66.01 CLASS 3 SEVERE OBESITY DUE TO EXCESS CALORIES WITH SERIOUS COMORBIDITY AND BODY MASS INDEX (BMI) OF 45.0 TO 49.9 IN ADULT (HCC): Primary | ICD-10-CM

## 2024-03-20 DIAGNOSIS — E11.65 TYPE 2 DIABETES MELLITUS WITH HYPERGLYCEMIA, WITHOUT LONG-TERM CURRENT USE OF INSULIN (HCC): ICD-10-CM

## 2024-03-20 PROCEDURE — 11045 DBRDMT SUBQ TISS EACH ADDL: CPT

## 2024-03-20 PROCEDURE — 11042 DBRDMT SUBQ TIS 1ST 20SQCM/<: CPT

## 2024-03-20 RX ORDER — GINSENG 100 MG
CAPSULE ORAL ONCE
OUTPATIENT
Start: 2024-03-20 | End: 2024-03-20

## 2024-03-20 RX ORDER — LIDOCAINE HYDROCHLORIDE 20 MG/ML
JELLY TOPICAL ONCE
Status: COMPLETED | OUTPATIENT
Start: 2024-03-20 | End: 2024-03-20

## 2024-03-20 RX ORDER — BETAMETHASONE DIPROPIONATE 0.5 MG/G
CREAM TOPICAL ONCE
OUTPATIENT
Start: 2024-03-20 | End: 2024-03-20

## 2024-03-20 RX ORDER — BACITRACIN ZINC AND POLYMYXIN B SULFATE 500; 1000 [USP'U]/G; [USP'U]/G
OINTMENT TOPICAL ONCE
OUTPATIENT
Start: 2024-03-20 | End: 2024-03-20

## 2024-03-20 RX ORDER — SODIUM CHLOR/HYPOCHLOROUS ACID 0.033 %
SOLUTION, IRRIGATION IRRIGATION ONCE
OUTPATIENT
Start: 2024-03-20 | End: 2024-03-20

## 2024-03-20 RX ORDER — LIDOCAINE 50 MG/G
OINTMENT TOPICAL ONCE
OUTPATIENT
Start: 2024-03-20 | End: 2024-03-20

## 2024-03-20 RX ORDER — CLOBETASOL PROPIONATE 0.5 MG/G
OINTMENT TOPICAL ONCE
OUTPATIENT
Start: 2024-03-20 | End: 2024-03-20

## 2024-03-20 RX ORDER — TRIAMCINOLONE ACETONIDE 1 MG/G
OINTMENT TOPICAL ONCE
OUTPATIENT
Start: 2024-03-20 | End: 2024-03-20

## 2024-03-20 RX ORDER — LIDOCAINE 40 MG/G
CREAM TOPICAL ONCE
OUTPATIENT
Start: 2024-03-20 | End: 2024-03-20

## 2024-03-20 RX ORDER — LIDOCAINE HYDROCHLORIDE 20 MG/ML
JELLY TOPICAL ONCE
OUTPATIENT
Start: 2024-03-20 | End: 2024-03-20

## 2024-03-20 RX ORDER — IBUPROFEN 200 MG
TABLET ORAL ONCE
OUTPATIENT
Start: 2024-03-20 | End: 2024-03-20

## 2024-03-20 RX ORDER — GENTAMICIN SULFATE 1 MG/G
OINTMENT TOPICAL ONCE
OUTPATIENT
Start: 2024-03-20 | End: 2024-03-20

## 2024-03-20 RX ORDER — LIDOCAINE HYDROCHLORIDE 40 MG/ML
SOLUTION TOPICAL ONCE
OUTPATIENT
Start: 2024-03-20 | End: 2024-03-20

## 2024-03-20 RX ADMIN — LIDOCAINE HYDROCHLORIDE 6 ML: 20 JELLY TOPICAL at 12:57

## 2024-03-20 ASSESSMENT — PAIN SCALES - GENERAL: PAINLEVEL_OUTOF10: 0

## 2024-03-20 NOTE — PROGRESS NOTES
[facility]   Progress Note and Procedure Note      Theresa Crow  MEDICAL RECORD NUMBER:  [id number]  AGE: 79 y.o.   GENDER: female  : 1945  EPISODE DATE:  3/20/2024    Subjective:     Chief Complaint   Patient presents with    Wound Check     Right Leg, Left Leg         HISTORY of PRESENT ILLNESS HPI     Theresa Crow is a 79 y.o. female who presents today for wound/ulcer evaluation.     History of Wound Context: constant  Quality of pain: aching, burning  Severity:  2 / 10   Modifying Factors: Pain is relieved/improved with rest  Associated Signs/Symptoms: edema and erythema     Wound/Ulcer Identification:  Ulcer Type: venous  Contributing Factors: edema, venous stasis, diabetes, and obesity             PAST MEDICAL HISTORY        Diagnosis Date    Allergic rhinitis     Arthritis     Generalized    Borderline diabetes     Caffeine use     2 coffee day    COPD (chronic obstructive pulmonary disease) (HCC)     EARLY STAGE    GERD (gastroesophageal reflux disease)     Kidney stones     Mumps     MVP (mitral valve prolapse)     Nausea & vomiting     Palpitations     PONV (postoperative nausea and vomiting)     Ringing in ears     Schamberg's disease     Thoracic outlet syndrome     Type II or unspecified type diabetes mellitus without mention of complication, not stated as uncontrolled     Borderline, no meds    Under care of team 2021    PCP: Janette Uribe, last visit 2021    Under care of team 2021    Cardiologist: Janette Melchor, last visit 2021    Under care of team 2021    Urology: Dr. Turner, last visit 2021    Under care of team 2021    Endo: Dr. Pelletier, [facility], last visit 2021       PAST SURGICAL HISTORY    Past Surgical History:   Procedure Laterality Date    ACHILLES TENDON SURGERY Bilateral     ANGIOPLASTY      APPENDECTOMY      BACK SURGERY      BREAST BIOPSY      CARPAL TUNNEL RELEASE

## 2024-03-20 NOTE — PROGRESS NOTES
Multilayer Compression Wrap   (Not Unna) Below the Knee    NAME:  Theresa Crow  YOB: 1945  MEDICAL RECORD NUMBER:  [id number]  DATE:  3/20/2024    Multilayer compression wrap: Removed old Multilayer wrap if indicated and wash leg with mild soap/water.  Applied moisturizing agent to dry skin as needed.   Applied primary and secondary dressing as ordered.  Applied multilayered dressing below the knee to right lower leg.  Applied multilayered dressing below the knee to left lower leg.  Instructed patient/caregiver not to remove dressing and to keep it clean and dry.   Instructed patient/caregiver on complications to report to provider, such as pain, numbness in toes, heavy drainage, and slippage of dressing.  Instructed patient on purpose of compression dressing and on activity and exercise recommendations.      Electronically signed by Eren Cortes RN on 3/20/2024 at 1:58 PM

## 2024-03-25 NOTE — DISCHARGE INSTRUCTIONS
[facility] -Phone: [phone] Fax: [phone]    Visit  Discharge Instructions / Physician Orders     DATE: 3/26/2024     Home Care: [facility]     SUPPLIES ORDERED THRU: NA     Wound Location: Bilateral Lower Legs     Cleanse with: Liquid antibacterial soap and water, rinse well      Dressing Orders: Collagen, Silver Alginate, Calamine Layer, 3 Layer Compression Wrap     Frequency: Keep Dry and Intact     Additional Orders: Increase protein to diet (meat, cheese, eggs, fish, peanut butter, nuts and beans)  ELEVATE LEGS AS MUCH AS POSSIBLE  3/26/24-Lymphedma referral       Your next appointment with Wound Care Center is in 1 week     (Please note your next appointment above and if you are unable to keep, kindly give a 24 hour notice. Thank you.)  If more than 15 min late we cannot guarantee you will be seen due to clinician schedule  Per Policy, Excessive cancellation will call for dismissal from program.     If you experience any of the following, please call the Wound Care Center during business hours:  [phone]  Your Phone call may be forwarded to [facility] during business hours that [place] is closed.     * Increase in Pain  * Temperature over 101  * Increase in drainage from your wound  * Drainage with a foul odor  * Bleeding  * Increase in swelling  * Need for compression bandage changes due to slippage, breakthrough drainage.     If you need medical attention outside of the business hours of the Wound Care Centers please contact your PCP or go to the nearest emergency room.     The information contained in the After Visit Summary has been reviewed with me, the patient and/or responsible adult, by my health care provider(s). I had the opportunity to ask questions regarding this information. I have elected to receive;      []After Visit Summary  [x]Comprehensive Discharge Instruction        Patient

## 2024-03-26 ENCOUNTER — HOSPITAL ENCOUNTER (OUTPATIENT)
Dept: WOUND CARE | Age: 79
Discharge: HOME | End: 2024-03-26
Payer: MEDICARE

## 2024-03-26 ENCOUNTER — TELEPHONE (OUTPATIENT)
Dept: WOUND CARE | Age: 79
End: 2024-03-26

## 2024-03-26 VITALS
WEIGHT: 268.96 LBS | HEART RATE: 61 BPM | TEMPERATURE: 96.9 F | SYSTOLIC BLOOD PRESSURE: 148 MMHG | RESPIRATION RATE: 16 BRPM | DIASTOLIC BLOOD PRESSURE: 67 MMHG | BODY MASS INDEX: 44.76 KG/M2

## 2024-03-26 DIAGNOSIS — E66.01 CLASS 3 SEVERE OBESITY DUE TO EXCESS CALORIES WITH SERIOUS COMORBIDITY AND BODY MASS INDEX (BMI) OF 45.0 TO 49.9 IN ADULT (HCC): Primary | ICD-10-CM

## 2024-03-26 DIAGNOSIS — E11.65 TYPE 2 DIABETES MELLITUS WITH HYPERGLYCEMIA, WITHOUT LONG-TERM CURRENT USE OF INSULIN (HCC): ICD-10-CM

## 2024-03-26 DIAGNOSIS — I89.0 LYMPHEDEMA OF BOTH LOWER EXTREMITIES: ICD-10-CM

## 2024-03-26 DIAGNOSIS — I83.023 VENOUS STASIS ULCER OF LEFT ANKLE WITH FAT LAYER EXPOSED WITH VARICOSE VEINS (HCC): ICD-10-CM

## 2024-03-26 DIAGNOSIS — I83.013 VENOUS STASIS ULCER OF RIGHT ANKLE WITH FAT LAYER EXPOSED WITH VARICOSE VEINS (HCC): ICD-10-CM

## 2024-03-26 DIAGNOSIS — I89.0 LYMPHEDEMA OF BOTH LOWER EXTREMITIES: Primary | ICD-10-CM

## 2024-03-26 DIAGNOSIS — L97.312 VENOUS STASIS ULCER OF RIGHT ANKLE WITH FAT LAYER EXPOSED WITH VARICOSE VEINS (HCC): ICD-10-CM

## 2024-03-26 DIAGNOSIS — L97.322 VENOUS STASIS ULCER OF LEFT ANKLE WITH FAT LAYER EXPOSED WITH VARICOSE VEINS (HCC): ICD-10-CM

## 2024-03-26 PROCEDURE — 11042 DBRDMT SUBQ TIS 1ST 20SQCM/<: CPT | Performed by: SURGERY

## 2024-03-26 PROCEDURE — 11042 DBRDMT SUBQ TIS 1ST 20SQCM/<: CPT

## 2024-03-26 RX ORDER — BACITRACIN ZINC AND POLYMYXIN B SULFATE 500; 1000 [USP'U]/G; [USP'U]/G
OINTMENT TOPICAL ONCE
OUTPATIENT
Start: 2024-03-26 | End: 2024-03-26

## 2024-03-26 RX ORDER — GENTAMICIN SULFATE 1 MG/G
OINTMENT TOPICAL ONCE
OUTPATIENT
Start: 2024-03-26 | End: 2024-03-26

## 2024-03-26 RX ORDER — LIDOCAINE 40 MG/G
CREAM TOPICAL ONCE
OUTPATIENT
Start: 2024-03-26 | End: 2024-03-26

## 2024-03-26 RX ORDER — LIDOCAINE HYDROCHLORIDE 20 MG/ML
JELLY TOPICAL ONCE
Status: COMPLETED | OUTPATIENT
Start: 2024-03-26 | End: 2024-03-26

## 2024-03-26 RX ORDER — GINSENG 100 MG
CAPSULE ORAL ONCE
OUTPATIENT
Start: 2024-03-26 | End: 2024-03-26

## 2024-03-26 RX ORDER — CLOBETASOL PROPIONATE 0.5 MG/G
OINTMENT TOPICAL ONCE
OUTPATIENT
Start: 2024-03-26 | End: 2024-03-26

## 2024-03-26 RX ORDER — LIDOCAINE HYDROCHLORIDE 20 MG/ML
JELLY TOPICAL ONCE
OUTPATIENT
Start: 2024-03-26 | End: 2024-03-26

## 2024-03-26 RX ORDER — SODIUM CHLOR/HYPOCHLOROUS ACID 0.033 %
SOLUTION, IRRIGATION IRRIGATION ONCE
OUTPATIENT
Start: 2024-03-26 | End: 2024-03-26

## 2024-03-26 RX ORDER — TRIAMCINOLONE ACETONIDE 1 MG/G
OINTMENT TOPICAL ONCE
OUTPATIENT
Start: 2024-03-26 | End: 2024-03-26

## 2024-03-26 RX ORDER — LIDOCAINE 50 MG/G
OINTMENT TOPICAL ONCE
OUTPATIENT
Start: 2024-03-26 | End: 2024-03-26

## 2024-03-26 RX ORDER — IBUPROFEN 200 MG
TABLET ORAL ONCE
OUTPATIENT
Start: 2024-03-26 | End: 2024-03-26

## 2024-03-26 RX ORDER — BETAMETHASONE DIPROPIONATE 0.5 MG/G
CREAM TOPICAL ONCE
OUTPATIENT
Start: 2024-03-26 | End: 2024-03-26

## 2024-03-26 RX ORDER — LIDOCAINE HYDROCHLORIDE 40 MG/ML
SOLUTION TOPICAL ONCE
OUTPATIENT
Start: 2024-03-26 | End: 2024-03-26

## 2024-03-26 RX ADMIN — LIDOCAINE HYDROCHLORIDE 6 ML: 20 JELLY TOPICAL at 10:40

## 2024-03-26 ASSESSMENT — PAIN SCALES - GENERAL: PAINLEVEL_OUTOF10: 5

## 2024-03-26 NOTE — PROGRESS NOTES
Multilayer Compression Wrap   (Not Unna) Below the Knee    NAME:  Theresa Crow  YOB: 1945  MEDICAL RECORD NUMBER:  [id number]  DATE:  3/26/2024    Multilayer compression wrap: Removed old Multilayer wrap if indicated and wash leg with mild soap/water.  Applied moisturizing agent to dry skin as needed.   Applied primary and secondary dressing as ordered.  Applied multilayered dressing below the knee to right lower leg.  Applied multilayered dressing below the knee to left lower leg.  Instructed patient/caregiver not to remove dressing and to keep it clean and dry.   Instructed patient/caregiver on complications to report to provider, such as pain, numbness in toes, heavy drainage, and slippage of dressing.  Instructed patient on purpose of compression dressing and on activity and exercise recommendations.      Electronically signed by Eren Cortes RN on 3/26/2024 at 11:40 AM  
normal      Assessment:     Problem List Items Addressed This Visit       Type 2 diabetes mellitus with hyperglycemia (HCC)    Relevant Orders    Initiate Outpatient Wound Care Protocol    Class 3 severe obesity due to excess calories with serious comorbidity and body mass index (BMI) of 45.0 to 49.9 in adult (HCC) - Primary    Relevant Orders    Initiate Outpatient Wound Care Protocol    Venous stasis ulcer of right ankle with fat layer exposed (HCC)    Relevant Orders    Initiate Outpatient Wound Care Protocol    Venous stasis ulcer of left ankle with fat layer exposed (HCC)    Relevant Orders    Initiate Outpatient Wound Care Protocol    Lymphedema of lower extremity    Relevant Orders    Initiate Outpatient Wound Care Protocol        Procedure Note  Indications:  Based on my examination of this patient's wound(s)/ulcer(s) today, debridement is required to promote healing and evaluate the wound base.    Performed by: Arthur Delos Reyes, MD    Consent obtained:  Yes    Time out taken:  Yes    Pain Control: Anesthetic  Anesthetic: 2% Lidocaine Gel Topical       Debridement:Excisional Debridement    Using curette the wound(s)/ulcer(s) was/were sharply debrided down through and including the removal of subcutaneous tissue.        Devitalized Tissue Debrided:  fibrin and biofilm    Pre Debridement Measurements:  Are located in the Wound/Ulcer Documentation Flow Sheet    Wound/Ulcer #:3    Post Debridement Measurements:  Wound/Ulcer Descriptions are Pre Debridement except measurements:        Incision 06/09/14 Leg Left (Active)   Number of days: 3577       Wound 03/13/24 Pretibial Right #1 cluster (Active)   Wound Image   03/13/24 1320   Wound Etiology Venous 03/20/24 1325   Dressing Status New drainage noted;Old drainage noted 03/20/24 1325   Wound Cleansed Soap and water 03/20/24 1325   Wound Length (cm) 0 cm 03/26/24 1035   Wound Width (cm) 0 cm 03/26/24 1035   Wound Depth (cm) 0 cm 03/26/24 1035   Wound Surface

## 2024-03-27 NOTE — DISCHARGE INSTRUCTIONS
[facility] -Phone: [phone] Fax: [phone]    Visit  Discharge Instructions / Physician Orders     DATE: 4/2/2024     Home Care: [facility]     SUPPLIES ORDERED THRU: NA     Wound Location: Bilateral Lower Legs     Cleanse with: Liquid antibacterial soap and water, rinse well      Dressing Orders: Calamine Layer, 3 Layer Compression Wrap     Frequency: Keep Dry and Intact     Additional Orders: Increase protein to diet (meat, cheese, eggs, fish, peanut butter, nuts and beans)  ELEVATE LEGS AS MUCH AS POSSIBLE  3/26/24-Lymphedma referral. Call [phone] to schedule an appointment     Your next appointment with Wound Care Center is in 1 week     (Please note your next appointment above and if you are unable to keep, kindly give a 24 hour notice. Thank you.)  If more than 15 min late we cannot guarantee you will be seen due to clinician schedule  Per Policy, Excessive cancellation will call for dismissal from program.     If you experience any of the following, please call the Wound Care Center during business hours:  [phone]  Your Phone call may be forwarded to [facility] during business hours that [place] is closed.     * Increase in Pain  * Temperature over 101  * Increase in drainage from your wound  * Drainage with a foul odor  * Bleeding  * Increase in swelling  * Need for compression bandage changes due to slippage, breakthrough drainage.     If you need medical attention outside of the business hours of the Wound Care Centers please contact your PCP or go to the nearest emergency room.     The information contained in the After Visit Summary has been reviewed with me, the patient and/or responsible adult, by my health care provider(s). I had the opportunity to ask questions regarding this information. I have elected to receive;      []After Visit Summary  [x]Comprehensive Discharge Instruction        Patient

## 2024-04-02 ENCOUNTER — HOSPITAL ENCOUNTER (OUTPATIENT)
Dept: WOUND CARE | Age: 79
Discharge: HOME | End: 2024-04-02
Payer: MEDICARE

## 2024-04-02 VITALS
TEMPERATURE: 97.2 F | BODY MASS INDEX: 44.65 KG/M2 | SYSTOLIC BLOOD PRESSURE: 137 MMHG | DIASTOLIC BLOOD PRESSURE: 58 MMHG | RESPIRATION RATE: 16 BRPM | HEIGHT: 65 IN | HEART RATE: 55 BPM | WEIGHT: 268 LBS

## 2024-04-02 DIAGNOSIS — L97.312 VENOUS STASIS ULCER OF RIGHT ANKLE WITH FAT LAYER EXPOSED WITH VARICOSE VEINS (HCC): ICD-10-CM

## 2024-04-02 DIAGNOSIS — I83.023 VENOUS STASIS ULCER OF LEFT ANKLE WITH FAT LAYER EXPOSED WITH VARICOSE VEINS (HCC): ICD-10-CM

## 2024-04-02 DIAGNOSIS — E11.65 TYPE 2 DIABETES MELLITUS WITH HYPERGLYCEMIA, WITHOUT LONG-TERM CURRENT USE OF INSULIN (HCC): ICD-10-CM

## 2024-04-02 DIAGNOSIS — I83.013 VENOUS STASIS ULCER OF RIGHT ANKLE WITH FAT LAYER EXPOSED WITH VARICOSE VEINS (HCC): ICD-10-CM

## 2024-04-02 DIAGNOSIS — E66.01 CLASS 3 SEVERE OBESITY DUE TO EXCESS CALORIES WITH SERIOUS COMORBIDITY AND BODY MASS INDEX (BMI) OF 45.0 TO 49.9 IN ADULT (HCC): Primary | ICD-10-CM

## 2024-04-02 DIAGNOSIS — I89.0 LYMPHEDEMA OF BOTH LOWER EXTREMITIES: ICD-10-CM

## 2024-04-02 DIAGNOSIS — L97.322 VENOUS STASIS ULCER OF LEFT ANKLE WITH FAT LAYER EXPOSED WITH VARICOSE VEINS (HCC): ICD-10-CM

## 2024-04-02 PROCEDURE — 99212 OFFICE O/P EST SF 10 MIN: CPT

## 2024-04-02 PROCEDURE — 99212 OFFICE O/P EST SF 10 MIN: CPT | Performed by: SURGERY

## 2024-04-02 RX ORDER — IBUPROFEN 200 MG
TABLET ORAL ONCE
OUTPATIENT
Start: 2024-04-02 | End: 2024-04-02

## 2024-04-02 RX ORDER — GENTAMICIN SULFATE 1 MG/G
OINTMENT TOPICAL ONCE
OUTPATIENT
Start: 2024-04-02 | End: 2024-04-02

## 2024-04-02 RX ORDER — LIDOCAINE HYDROCHLORIDE 20 MG/ML
JELLY TOPICAL ONCE
Status: COMPLETED | OUTPATIENT
Start: 2024-04-02 | End: 2024-04-02

## 2024-04-02 RX ORDER — SODIUM CHLOR/HYPOCHLOROUS ACID 0.033 %
SOLUTION, IRRIGATION IRRIGATION ONCE
OUTPATIENT
Start: 2024-04-02 | End: 2024-04-02

## 2024-04-02 RX ORDER — LIDOCAINE HYDROCHLORIDE 40 MG/ML
SOLUTION TOPICAL ONCE
OUTPATIENT
Start: 2024-04-02 | End: 2024-04-02

## 2024-04-02 RX ORDER — LIDOCAINE HYDROCHLORIDE 20 MG/ML
JELLY TOPICAL ONCE
OUTPATIENT
Start: 2024-04-02 | End: 2024-04-02

## 2024-04-02 RX ORDER — BETAMETHASONE DIPROPIONATE 0.5 MG/G
CREAM TOPICAL ONCE
OUTPATIENT
Start: 2024-04-02 | End: 2024-04-02

## 2024-04-02 RX ORDER — BACITRACIN ZINC AND POLYMYXIN B SULFATE 500; 1000 [USP'U]/G; [USP'U]/G
OINTMENT TOPICAL ONCE
OUTPATIENT
Start: 2024-04-02 | End: 2024-04-02

## 2024-04-02 RX ORDER — CLOBETASOL PROPIONATE 0.5 MG/G
OINTMENT TOPICAL ONCE
OUTPATIENT
Start: 2024-04-02 | End: 2024-04-02

## 2024-04-02 RX ORDER — LIDOCAINE 40 MG/G
CREAM TOPICAL ONCE
OUTPATIENT
Start: 2024-04-02 | End: 2024-04-02

## 2024-04-02 RX ORDER — TRIAMCINOLONE ACETONIDE 1 MG/G
OINTMENT TOPICAL ONCE
OUTPATIENT
Start: 2024-04-02 | End: 2024-04-02

## 2024-04-02 RX ORDER — GINSENG 100 MG
CAPSULE ORAL ONCE
OUTPATIENT
Start: 2024-04-02 | End: 2024-04-02

## 2024-04-02 RX ORDER — LIDOCAINE 50 MG/G
OINTMENT TOPICAL ONCE
OUTPATIENT
Start: 2024-04-02 | End: 2024-04-02

## 2024-04-02 RX ADMIN — LIDOCAINE HYDROCHLORIDE 6 ML: 20 JELLY TOPICAL at 10:23

## 2024-04-02 ASSESSMENT — PAIN SCALES - GENERAL: PAINLEVEL_OUTOF10: 0

## 2024-04-02 NOTE — PROGRESS NOTES
[facility]   Progress Note and Procedure Note      Theresa Crow  MEDICAL RECORD NUMBER:  [id number]  AGE: 79 y.o.   GENDER: female  : 1945  EPISODE DATE:  2024    Subjective:     Chief Complaint   Patient presents with    Wound Check     Right Leg, Left Leg         HISTORY of PRESENT ILLNESS HPI     Theresa Crow is a 79 y.o. female who presents today for wound/ulcer evaluation.  She has bilateral lower extremity dependent edema.  She walks with a walker and sleeps in the recliner.  She has history of cellulitis which improved with antibiotics alone.    No drainage is reported on the wounds.  The skin remains fragile and she has not yet had her appointment with lymphedema therapy.  We will rewrap her legs again to make sure the skin does not immediately breakdown.      PAST MEDICAL HISTORY        Diagnosis Date    Allergic rhinitis     Arthritis     Generalized    Borderline diabetes     Caffeine use     2 coffee day    COPD (chronic obstructive pulmonary disease) (HCC)     EARLY STAGE    GERD (gastroesophageal reflux disease)     Kidney stones     Mumps     MVP (mitral valve prolapse)     Nausea & vomiting     Palpitations     PONV (postoperative nausea and vomiting)     Ringing in ears     Schamberg's disease     Thoracic outlet syndrome     Type II or unspecified type diabetes mellitus without mention of complication, not stated as uncontrolled     Borderline, no meds    Under care of team 2021    PCP: Janette Uribe, last visit 2021    Under care of team 2021    Cardiologist: Janette Melchor, last visit 2021    Under care of team 2021    Urology: Dr. Turner, last visit 2021    Under care of team 2021    Endo: Dr. Pelletier, [facility], last visit 2021       PAST SURGICAL HISTORY    Past Surgical History:   Procedure Laterality Date    ACHILLES TENDON SURGERY Bilateral     ANGIOPLASTY      APPENDECTOMY

## 2024-04-02 NOTE — PLAN OF CARE
Problem: Wound:  Goal: Will show signs of wound healing; wound closure and no evidence of infection  Description: Will show signs of wound healing; wound closure and no evidence of infection  4/2/2024 1036 by Tasha Heath, RN  Outcome: Progressing  4/2/2024 0956 by Tasha Heath, RN  Outcome: Progressing     Problem: Falls - Risk of:  Goal: Will remain free from falls  Description: Will remain free from falls  Outcome: Progressing

## 2024-04-02 NOTE — PROGRESS NOTES
Multilayer Compression Wrap   (Not Unna) Below the Knee    NAME:  Theresa Crow  YOB: 1945  MEDICAL RECORD NUMBER:  [id number]  DATE:  4/2/2024    Multilayer compression wrap: Removed old Multilayer wrap if indicated and wash leg with mild soap/water.  Applied moisturizing agent to dry skin as needed.   Applied primary and secondary dressing as ordered.  Applied multilayered dressing below the knee to right lower leg.  Applied multilayered dressing below the knee to left lower leg.  Instructed patient/caregiver not to remove dressing and to keep it clean and dry.   Instructed patient/caregiver on complications to report to provider, such as pain, numbness in toes, heavy drainage, and slippage of dressing.  Instructed patient on purpose of compression dressing and on activity and exercise recommendations.      Electronically signed by Eren Cortes RN on 4/2/2024 at 10:52 AM

## 2024-04-09 ENCOUNTER — HOSPITAL ENCOUNTER (OUTPATIENT)
Dept: WOUND CARE | Age: 79
Discharge: HOME | End: 2024-04-09
Payer: MEDICARE

## 2024-04-09 VITALS
WEIGHT: 268 LBS | BODY MASS INDEX: 44.65 KG/M2 | DIASTOLIC BLOOD PRESSURE: 64 MMHG | SYSTOLIC BLOOD PRESSURE: 176 MMHG | TEMPERATURE: 97.1 F | HEART RATE: 61 BPM | HEIGHT: 65 IN | RESPIRATION RATE: 18 BRPM

## 2024-04-09 DIAGNOSIS — I83.013 VENOUS STASIS ULCER OF RIGHT ANKLE WITH FAT LAYER EXPOSED WITH VARICOSE VEINS (HCC): ICD-10-CM

## 2024-04-09 DIAGNOSIS — L97.312 VENOUS STASIS ULCER OF RIGHT ANKLE WITH FAT LAYER EXPOSED WITH VARICOSE VEINS (HCC): ICD-10-CM

## 2024-04-09 DIAGNOSIS — I89.0 LYMPHEDEMA OF BOTH LOWER EXTREMITIES: ICD-10-CM

## 2024-04-09 DIAGNOSIS — E11.65 TYPE 2 DIABETES MELLITUS WITH HYPERGLYCEMIA, WITHOUT LONG-TERM CURRENT USE OF INSULIN (HCC): ICD-10-CM

## 2024-04-09 DIAGNOSIS — L97.322 VENOUS STASIS ULCER OF LEFT ANKLE WITH FAT LAYER EXPOSED WITH VARICOSE VEINS (HCC): ICD-10-CM

## 2024-04-09 DIAGNOSIS — E66.01 CLASS 3 SEVERE OBESITY DUE TO EXCESS CALORIES WITH SERIOUS COMORBIDITY AND BODY MASS INDEX (BMI) OF 45.0 TO 49.9 IN ADULT (HCC): Primary | ICD-10-CM

## 2024-04-09 DIAGNOSIS — I83.023 VENOUS STASIS ULCER OF LEFT ANKLE WITH FAT LAYER EXPOSED WITH VARICOSE VEINS (HCC): ICD-10-CM

## 2024-04-09 PROCEDURE — 99213 OFFICE O/P EST LOW 20 MIN: CPT

## 2024-04-09 PROCEDURE — 99212 OFFICE O/P EST SF 10 MIN: CPT | Performed by: SURGERY

## 2024-04-09 RX ORDER — IBUPROFEN 200 MG
TABLET ORAL ONCE
OUTPATIENT
Start: 2024-04-09 | End: 2024-04-09

## 2024-04-09 RX ORDER — GENTAMICIN SULFATE 1 MG/G
OINTMENT TOPICAL ONCE
OUTPATIENT
Start: 2024-04-09 | End: 2024-04-09

## 2024-04-09 RX ORDER — LIDOCAINE HYDROCHLORIDE 20 MG/ML
JELLY TOPICAL ONCE
Status: COMPLETED | OUTPATIENT
Start: 2024-04-09 | End: 2024-04-09

## 2024-04-09 RX ORDER — CLOBETASOL PROPIONATE 0.5 MG/G
OINTMENT TOPICAL ONCE
OUTPATIENT
Start: 2024-04-09 | End: 2024-04-09

## 2024-04-09 RX ORDER — BACITRACIN ZINC AND POLYMYXIN B SULFATE 500; 1000 [USP'U]/G; [USP'U]/G
OINTMENT TOPICAL ONCE
OUTPATIENT
Start: 2024-04-09 | End: 2024-04-09

## 2024-04-09 RX ORDER — TRIAMCINOLONE ACETONIDE 1 MG/G
OINTMENT TOPICAL ONCE
OUTPATIENT
Start: 2024-04-09 | End: 2024-04-09

## 2024-04-09 RX ORDER — LIDOCAINE HYDROCHLORIDE 40 MG/ML
SOLUTION TOPICAL ONCE
OUTPATIENT
Start: 2024-04-09 | End: 2024-04-09

## 2024-04-09 RX ORDER — BETAMETHASONE DIPROPIONATE 0.5 MG/G
CREAM TOPICAL ONCE
OUTPATIENT
Start: 2024-04-09 | End: 2024-04-09

## 2024-04-09 RX ORDER — GINSENG 100 MG
CAPSULE ORAL ONCE
OUTPATIENT
Start: 2024-04-09 | End: 2024-04-09

## 2024-04-09 RX ORDER — LIDOCAINE 50 MG/G
OINTMENT TOPICAL ONCE
OUTPATIENT
Start: 2024-04-09 | End: 2024-04-09

## 2024-04-09 RX ORDER — SODIUM CHLOR/HYPOCHLOROUS ACID 0.033 %
SOLUTION, IRRIGATION IRRIGATION ONCE
OUTPATIENT
Start: 2024-04-09 | End: 2024-04-09

## 2024-04-09 RX ORDER — LIDOCAINE 40 MG/G
CREAM TOPICAL ONCE
OUTPATIENT
Start: 2024-04-09 | End: 2024-04-09

## 2024-04-09 RX ORDER — LIDOCAINE HYDROCHLORIDE 20 MG/ML
JELLY TOPICAL ONCE
OUTPATIENT
Start: 2024-04-09 | End: 2024-04-09

## 2024-04-09 RX ADMIN — LIDOCAINE HYDROCHLORIDE 6 ML: 20 JELLY TOPICAL at 10:23

## 2024-04-09 ASSESSMENT — PAIN SCALES - GENERAL: PAINLEVEL_OUTOF10: 8

## 2024-04-09 NOTE — PROGRESS NOTES
Unna Boot Application   Below Knee    NAME:  Theresa Crow  YOB: 1945  MEDICAL RECORD NUMBER:  [id number]  DATE:  4/9/2024    [x] Applied moisturizing agent to dry skin as needed.   [x] Appied primary and secondary dressing as ordered    [x] Applied Unna roll from toes to knee overlapping each time.   [x] Applied ace wrap or coban from toes to below the knee.   [x] Secured with tape and/or metal clips covered with tape.   [x] Instructed patient/caregiver to keep dressing dry and intact. DO NOT REMOVE DRESSING.   [x] Instructed pt/family/caregiver to report excessive draining, loose bandage, wet dressing, severe pain or tingling in toes.  [x] Applied Unna Boot dressing below the knee to Bilateral lower leg(s)       Unna Boot(s) were applied per  Guidelines.     Electronically signed by Sarah Petit RN on 4/9/2024 at 10:42 AM      
cm^2 04/02/24 1024   Post-Procedure Volume (cm^3) 0 cm^3 04/02/24 1024   Wound Assessment Pink/red 03/26/24 1035   Drainage Amount Moderate (25-50%) 03/26/24 1035   Drainage Description Serous 03/26/24 1035   Odor Moderate 03/26/24 1035   Mckayla-wound Assessment Blanchable erythema 03/26/24 1035   Margins Defined edges 03/26/24 1035   Wound Thickness Description not for Pressure Injury Full thickness 03/26/24 1035   Number of days: 19       Wound 03/20/24 Pretibial Left #4 Cluster (Active)   Wound Image   03/26/24 1035   Wound Etiology Venous 04/02/24 1024   Dressing Status Dry;Intact 04/02/24 1024   Wound Cleansed Soap and water 04/02/24 1024   Wound Length (cm) 0 cm 04/02/24 1024   Wound Width (cm) 0 cm 04/02/24 1024   Wound Depth (cm) 0 cm 04/02/24 1024   Wound Surface Area (cm^2) 0 cm^2 04/02/24 1024   Change in Wound Size % (l*w) 100 04/02/24 1024   Wound Volume (cm^3) 0 cm^3 04/02/24 1024   Wound Healing % 100 04/02/24 1024   Post-Procedure Length (cm) 0 cm 04/02/24 1024   Post-Procedure Width (cm) 0 cm 04/02/24 1024   Post-Procedure Depth (cm) 0 cm 04/02/24 1024   Post-Procedure Surface Area (cm^2) 0 cm^2 04/02/24 1024   Post-Procedure Volume (cm^3) 0 cm^3 04/02/24 1024   Wound Assessment Ruby/red;Slough 03/26/24 1035   Drainage Amount Moderate (25-50%) 03/26/24 1035   Drainage Description Serosanguinous 03/26/24 1035   Odor None 03/26/24 1035   Mckayla-wound Assessment Fragile;Blanchable erythema 03/26/24 1035   Margins Defined edges 03/26/24 1035   Wound Thickness Description not for Pressure Injury Full thickness 03/26/24 1035   Number of days: 12       Wound 03/26/24 Leg Right;Lower;Medial;Proximal #5 (Active)   Wound Image   03/26/24 1038   Wound Etiology Venous 04/02/24 1024   Dressing Status Dry;Intact 04/02/24 1024   Wound Cleansed Soap and water 04/02/24 1024   Wound Length (cm) 0 cm 04/02/24 1024   Wound Width (cm) 0 cm 04/02/24 1024   Wound Depth (cm) 0 cm 04/02/24 1024   Wound Surface Area (cm^2) 0

## 2024-04-10 NOTE — DISCHARGE INSTRUCTIONS
signature______________________________________Date:________  Electronically signed by Arthur Delos Reyes, MD on 4/16/2024 at 9:57 AM  Electronically signed by Tasha Heath RN on 4/16/2024 at 10:16 AM

## 2024-04-16 ENCOUNTER — HOSPITAL ENCOUNTER (OUTPATIENT)
Dept: WOUND CARE | Age: 79
Discharge: HOME | End: 2024-04-16
Payer: MEDICARE

## 2024-04-16 VITALS
HEART RATE: 60 BPM | TEMPERATURE: 96.8 F | RESPIRATION RATE: 16 BRPM | SYSTOLIC BLOOD PRESSURE: 146 MMHG | DIASTOLIC BLOOD PRESSURE: 56 MMHG

## 2024-04-16 DIAGNOSIS — E11.65 TYPE 2 DIABETES MELLITUS WITH HYPERGLYCEMIA, WITHOUT LONG-TERM CURRENT USE OF INSULIN (HCC): ICD-10-CM

## 2024-04-16 DIAGNOSIS — L97.322 VENOUS STASIS ULCER OF LEFT ANKLE WITH FAT LAYER EXPOSED WITH VARICOSE VEINS (HCC): ICD-10-CM

## 2024-04-16 DIAGNOSIS — I83.023 VENOUS STASIS ULCER OF LEFT ANKLE WITH FAT LAYER EXPOSED WITH VARICOSE VEINS (HCC): ICD-10-CM

## 2024-04-16 DIAGNOSIS — I83.013 VENOUS STASIS ULCER OF RIGHT ANKLE WITH FAT LAYER EXPOSED WITH VARICOSE VEINS (HCC): ICD-10-CM

## 2024-04-16 DIAGNOSIS — E66.01 CLASS 3 SEVERE OBESITY DUE TO EXCESS CALORIES WITH SERIOUS COMORBIDITY AND BODY MASS INDEX (BMI) OF 45.0 TO 49.9 IN ADULT (HCC): Primary | ICD-10-CM

## 2024-04-16 DIAGNOSIS — I89.0 LYMPHEDEMA OF BOTH LOWER EXTREMITIES: ICD-10-CM

## 2024-04-16 DIAGNOSIS — L97.312 VENOUS STASIS ULCER OF RIGHT ANKLE WITH FAT LAYER EXPOSED WITH VARICOSE VEINS (HCC): ICD-10-CM

## 2024-04-16 PROCEDURE — 99212 OFFICE O/P EST SF 10 MIN: CPT

## 2024-04-16 PROCEDURE — 99212 OFFICE O/P EST SF 10 MIN: CPT | Performed by: SURGERY

## 2024-04-16 RX ORDER — LIDOCAINE 40 MG/G
CREAM TOPICAL ONCE
OUTPATIENT
Start: 2024-04-16 | End: 2024-04-16

## 2024-04-16 RX ORDER — BETAMETHASONE DIPROPIONATE 0.5 MG/G
CREAM TOPICAL ONCE
OUTPATIENT
Start: 2024-04-16 | End: 2024-04-16

## 2024-04-16 RX ORDER — IBUPROFEN 200 MG
TABLET ORAL ONCE
OUTPATIENT
Start: 2024-04-16 | End: 2024-04-16

## 2024-04-16 RX ORDER — LIDOCAINE 50 MG/G
OINTMENT TOPICAL ONCE
OUTPATIENT
Start: 2024-04-16 | End: 2024-04-16

## 2024-04-16 RX ORDER — CLOBETASOL PROPIONATE 0.5 MG/G
OINTMENT TOPICAL ONCE
OUTPATIENT
Start: 2024-04-16 | End: 2024-04-16

## 2024-04-16 RX ORDER — SODIUM CHLOR/HYPOCHLOROUS ACID 0.033 %
SOLUTION, IRRIGATION IRRIGATION ONCE
OUTPATIENT
Start: 2024-04-16 | End: 2024-04-16

## 2024-04-16 RX ORDER — BACITRACIN ZINC AND POLYMYXIN B SULFATE 500; 1000 [USP'U]/G; [USP'U]/G
OINTMENT TOPICAL ONCE
OUTPATIENT
Start: 2024-04-16 | End: 2024-04-16

## 2024-04-16 RX ORDER — LIDOCAINE HYDROCHLORIDE 20 MG/ML
JELLY TOPICAL ONCE
OUTPATIENT
Start: 2024-04-16 | End: 2024-04-16

## 2024-04-16 RX ORDER — GENTAMICIN SULFATE 1 MG/G
OINTMENT TOPICAL ONCE
OUTPATIENT
Start: 2024-04-16 | End: 2024-04-16

## 2024-04-16 RX ORDER — LIDOCAINE HYDROCHLORIDE 40 MG/ML
SOLUTION TOPICAL ONCE
OUTPATIENT
Start: 2024-04-16 | End: 2024-04-16

## 2024-04-16 RX ORDER — TRIAMCINOLONE ACETONIDE 1 MG/G
OINTMENT TOPICAL ONCE
OUTPATIENT
Start: 2024-04-16 | End: 2024-04-16

## 2024-04-16 RX ORDER — GINSENG 100 MG
CAPSULE ORAL ONCE
OUTPATIENT
Start: 2024-04-16 | End: 2024-04-16

## 2024-04-16 RX ORDER — LIDOCAINE HYDROCHLORIDE 20 MG/ML
JELLY TOPICAL ONCE
Status: DISCONTINUED | OUTPATIENT
Start: 2024-04-16 | End: 2024-04-17 | Stop reason: HOSPADM

## 2024-04-16 NOTE — PROGRESS NOTES
[facility]   Progress Note and Procedure Note      Theresa Crow  MEDICAL RECORD NUMBER:  [id number]  AGE: 79 y.o.   GENDER: female  : 1945  EPISODE DATE:  2024    Subjective:     Chief Complaint   Patient presents with    Wound Check     Right Leg, Left Leg         HISTORY of PRESENT ILLNESS HPI     Theresa Crow is a 79 y.o. female who presents today for wound/ulcer evaluation.  She has bilateral lower extremity dependent edema.  She walks with a walker and sleeps in the recliner.  She has history of cellulitis which improved with antibiotics alone.    No drainage is reported on the wounds.  The wrap on the left leg slipped and there is a small partial thickness blister.   She did get her compression stockings and we will place those this week.  Again there is no drainage on the left leg just a few small areas that appear irritated from the compression wrap.    PAST MEDICAL HISTORY        Diagnosis Date    Allergic rhinitis     Arthritis     Generalized    Borderline diabetes     Caffeine use     2 coffee day    COPD (chronic obstructive pulmonary disease) (HCC)     EARLY STAGE    GERD (gastroesophageal reflux disease)     Kidney stones     Mumps     MVP (mitral valve prolapse)     Nausea & vomiting     Palpitations     PONV (postoperative nausea and vomiting)     Ringing in ears     Schamberg's disease     Thoracic outlet syndrome     Type II or unspecified type diabetes mellitus without mention of complication, not stated as uncontrolled     Borderline, no meds    Under care of team 2021    PCP: Janette Uribe, last visit 2021    Under care of team 2021    Cardiologist: Janette Melchor, last visit 2021    Under care of team 2021    Urology: Dr. Turner, last visit 2021    Under care of team 2021    Endo: Dr. Pelletier, [facility], last visit 2021       PAST SURGICAL HISTORY    Past Surgical History:

## 2024-04-16 NOTE — PLAN OF CARE
Problem: Chronic Conditions and Co-morbidities  Goal: Patient's chronic conditions and co-morbidity symptoms are monitored and maintained or improved  Outcome: Adequate for Discharge     Problem: Wound:  Goal: Will show signs of wound healing; wound closure and no evidence of infection  Description: Will show signs of wound healing; wound closure and no evidence of infection  4/16/2024 1027 by Tasha Heath, RN  Outcome: Adequate for Discharge  4/16/2024 1015 by Tasha Heath, RN  Outcome: Progressing     Problem: Falls - Risk of:  Goal: Will remain free from falls  Description: Will remain free from falls  Outcome: Adequate for Discharge

## 2024-04-23 NOTE — DISCHARGE INSTRUCTIONS
[facility] -Phone: [phone] Fax: [phone]    Visit  Discharge Instructions / Physician Orders     DATE: 4/25/2024     Home Care: [facility]     SUPPLIES ORDERED THRU: NA     Wound Location: Bilateral Lower Legs     Cleanse with: Liquid antibacterial soap and water, rinse well      Dressing Orders: Fibracol, Silvercel, Calamine Layer, 3 Layer Compression Wrap     Frequency: Keep Dry and Intact     Additional Orders: Increase protein to diet (meat, cheese, eggs, fish, peanut butter, nuts and beans)  ELEVATE LEGS AS MUCH AS POSSIBLE  3/26/24-Lymphedma referral  4/25/24- Velcro Compression Wraps Ordered     Your next appointment with Wound Care Center is in 1 week     (Please note your next appointment above and if you are unable to keep, kindly give a 24 hour notice. Thank you.)  If more than 15 min late we cannot guarantee you will be seen due to clinician schedule  Per Policy, Excessive cancellation will call for dismissal from program.     If you experience any of the following, please call the Wound Care Center during business hours:  [phone]  Your Phone call may be forwarded to [place] [facility] during business hours that [place] is closed.     * Increase in Pain  * Temperature over 101  * Increase in drainage from your wound  * Drainage with a foul odor  * Bleeding  * Increase in swelling  * Need for compression bandage changes due to slippage, breakthrough drainage.     If you need medical attention outside of the business hours of the Wound Care Centers please contact your PCP or go to the nearest emergency room.     The information contained in the After Visit Summary has been reviewed with me, the patient and/or responsible adult, by my health care provider(s). I had the opportunity to ask questions regarding this information. I have elected to receive;      []After Visit Summary  [x]Comprehensive Discharge Instruction        Patient

## 2024-04-25 ENCOUNTER — HOSPITAL ENCOUNTER (OUTPATIENT)
Age: 79
Setting detail: SPECIMEN
Discharge: HOME | End: 2024-04-25
Payer: MEDICARE

## 2024-04-25 ENCOUNTER — HOSPITAL ENCOUNTER (OUTPATIENT)
Dept: WOUND CARE | Age: 79
Discharge: HOME | End: 2024-04-25
Payer: MEDICARE

## 2024-04-25 VITALS
SYSTOLIC BLOOD PRESSURE: 160 MMHG | DIASTOLIC BLOOD PRESSURE: 63 MMHG | TEMPERATURE: 97.2 F | HEART RATE: 64 BPM | RESPIRATION RATE: 18 BRPM

## 2024-04-25 DIAGNOSIS — I83.023 VENOUS STASIS ULCER OF LEFT ANKLE WITH FAT LAYER EXPOSED WITH VARICOSE VEINS (HCC): ICD-10-CM

## 2024-04-25 DIAGNOSIS — L97.312 VENOUS STASIS ULCER OF RIGHT ANKLE WITH FAT LAYER EXPOSED WITH VARICOSE VEINS (HCC): ICD-10-CM

## 2024-04-25 DIAGNOSIS — E66.01 CLASS 3 SEVERE OBESITY DUE TO EXCESS CALORIES WITH SERIOUS COMORBIDITY AND BODY MASS INDEX (BMI) OF 45.0 TO 49.9 IN ADULT (HCC): Primary | ICD-10-CM

## 2024-04-25 DIAGNOSIS — I83.013 VENOUS STASIS ULCER OF RIGHT ANKLE WITH FAT LAYER EXPOSED WITH VARICOSE VEINS (HCC): ICD-10-CM

## 2024-04-25 DIAGNOSIS — L97.322 VENOUS STASIS ULCER OF LEFT ANKLE WITH FAT LAYER EXPOSED WITH VARICOSE VEINS (HCC): ICD-10-CM

## 2024-04-25 DIAGNOSIS — I89.0 LYMPHEDEMA OF BOTH LOWER EXTREMITIES: ICD-10-CM

## 2024-04-25 DIAGNOSIS — E11.65 TYPE 2 DIABETES MELLITUS WITH HYPERGLYCEMIA, WITHOUT LONG-TERM CURRENT USE OF INSULIN (HCC): ICD-10-CM

## 2024-04-25 LAB
ANION GAP SERPL CALCULATED.3IONS-SCNC: 12 MMOL/L (ref 9–17)
BUN SERPL-MCNC: 33 MG/DL (ref 8–23)
BUN/CREAT SERPL: 21 (ref 9–20)
CALCIUM SERPL-MCNC: 9.5 MG/DL (ref 8.6–10.4)
CHLORIDE SERPL-SCNC: 99 MMOL/L (ref 98–107)
CO2 SERPL-SCNC: 30 MMOL/L (ref 20–31)
CREAT SERPL-MCNC: 1.6 MG/DL (ref 0.5–0.9)
GFR SERPL CREATININE-BSD FRML MDRD: 33 ML/MIN/1.73M2
GLUCOSE SERPL-MCNC: 154 MG/DL (ref 70–99)
POTASSIUM SERPL-SCNC: 4 MMOL/L (ref 3.7–5.3)
SODIUM SERPL-SCNC: 141 MMOL/L (ref 135–144)

## 2024-04-25 PROCEDURE — 80048 BASIC METABOLIC PNL TOTAL CA: CPT

## 2024-04-25 PROCEDURE — 11042 DBRDMT SUBQ TIS 1ST 20SQCM/<: CPT

## 2024-04-25 RX ORDER — LIDOCAINE 40 MG/G
CREAM TOPICAL ONCE
OUTPATIENT
Start: 2024-04-25 | End: 2024-04-25

## 2024-04-25 RX ORDER — GINSENG 100 MG
CAPSULE ORAL ONCE
OUTPATIENT
Start: 2024-04-25 | End: 2024-04-25

## 2024-04-25 RX ORDER — SODIUM CHLOR/HYPOCHLOROUS ACID 0.033 %
SOLUTION, IRRIGATION IRRIGATION ONCE
OUTPATIENT
Start: 2024-04-25 | End: 2024-04-25

## 2024-04-25 RX ORDER — LIDOCAINE HYDROCHLORIDE 40 MG/ML
SOLUTION TOPICAL ONCE
OUTPATIENT
Start: 2024-04-25 | End: 2024-04-25

## 2024-04-25 RX ORDER — LIDOCAINE HYDROCHLORIDE 20 MG/ML
JELLY TOPICAL ONCE
OUTPATIENT
Start: 2024-04-25 | End: 2024-04-25

## 2024-04-25 RX ORDER — LIDOCAINE 50 MG/G
OINTMENT TOPICAL ONCE
OUTPATIENT
Start: 2024-04-25 | End: 2024-04-25

## 2024-04-25 RX ORDER — BETAMETHASONE DIPROPIONATE 0.5 MG/G
CREAM TOPICAL ONCE
OUTPATIENT
Start: 2024-04-25 | End: 2024-04-25

## 2024-04-25 RX ORDER — TRIAMCINOLONE ACETONIDE 1 MG/G
OINTMENT TOPICAL ONCE
OUTPATIENT
Start: 2024-04-25 | End: 2024-04-25

## 2024-04-25 RX ORDER — LIDOCAINE HYDROCHLORIDE 20 MG/ML
JELLY TOPICAL ONCE
Status: COMPLETED | OUTPATIENT
Start: 2024-04-25 | End: 2024-04-25

## 2024-04-25 RX ORDER — BACITRACIN ZINC AND POLYMYXIN B SULFATE 500; 1000 [USP'U]/G; [USP'U]/G
OINTMENT TOPICAL ONCE
OUTPATIENT
Start: 2024-04-25 | End: 2024-04-25

## 2024-04-25 RX ORDER — GENTAMICIN SULFATE 1 MG/G
OINTMENT TOPICAL ONCE
OUTPATIENT
Start: 2024-04-25 | End: 2024-04-25

## 2024-04-25 RX ORDER — IBUPROFEN 200 MG
TABLET ORAL ONCE
OUTPATIENT
Start: 2024-04-25 | End: 2024-04-25

## 2024-04-25 RX ORDER — CLOBETASOL PROPIONATE 0.5 MG/G
OINTMENT TOPICAL ONCE
OUTPATIENT
Start: 2024-04-25 | End: 2024-04-25

## 2024-04-25 RX ADMIN — LIDOCAINE HYDROCHLORIDE 6 ML: 20 JELLY TOPICAL at 14:14

## 2024-04-25 NOTE — PROGRESS NOTES
PROVIDER NAME: REMA Carroll-CNP  NPI: [id number]                                    
REMA Sepulveda CNP    Consent obtained:  Yes    Time out taken:  Yes    Pain Control: Anesthetic  Anesthetic: 2% Lidocaine Gel Topical       Debridement:Excisional Debridement    Using curette the wound(s)/ulcer(s) was/were sharply debrided down through and including the removal of subcutaneous tissue.        Devitalized Tissue Debrided:  fibrin, biofilm, and slough    Pre Debridement Measurements:  Are located in the Wound/Ulcer Documentation Flow Sheet    Wound/Ulcer #: 1    Post Debridement Measurements:  Wound/Ulcer Descriptions are Pre Debridement except measurements:    Incision 06/09/14 Leg Left (Active)   Number of days: 3608       Wound 04/25/24 Pretibial Right;Lower #1 Cluster (Active)   Wound Image   04/25/24 1412   Wound Etiology Venous 04/25/24 1412   Wound Length (cm) 1.6 cm 04/25/24 1412   Wound Width (cm) 4.6 cm 04/25/24 1412   Wound Depth (cm) 0.1 cm 04/25/24 1412   Wound Surface Area (cm^2) 7.36 cm^2 04/25/24 1412   Wound Volume (cm^3) 0.736 cm^3 04/25/24 1412   Wound Assessment Pink/red 04/25/24 1412   Drainage Amount Moderate (25-50%) 04/25/24 1412   Drainage Description Serosanguinous 04/25/24 1412   Odor None 04/25/24 1412   Mckayla-wound Assessment Blanchable erythema 04/25/24 1412   Margins Defined edges 04/25/24 1412   Wound Thickness Description not for Pressure Injury Full thickness 04/25/24 1412   Number of days: 0          Percent of Wound(s)/Ulcer(s) Debrided: 100%    Total Surface Area Debrided:  7.36 sq cm     Diabetic/Pressure/Non Pressure Ulcers only:  Ulcer: Non-Pressure ulcer, fat layer exposed      Estimated Blood Loss:  Minimal    Hemostasis Achieved:  by pressure    Procedural Pain: 5 / 10     Post Procedural Pain:  3 / 10     Response to treatment:  Well tolerated by patient.                  Plan:     -3 layer compression    -no signs of infection    -RTC one week    -Upcoming appt with Lymphedema clinic    Will order Elastic Velcro Compression Wraps below the knee with

## 2024-04-30 NOTE — DISCHARGE INSTRUCTIONS
Patient signature______________________________________Date:________  Electronically signed by Sarah Petit RN on 5/1/2024 at 2:19 PM  Electronically signed by REMA Carroll CNP on 5/1/2024 at 2:30 PM

## 2024-05-01 ENCOUNTER — HOSPITAL ENCOUNTER (OUTPATIENT)
Dept: WOUND CARE | Age: 79
Discharge: HOME | End: 2024-05-01
Payer: MEDICARE

## 2024-05-01 VITALS
HEART RATE: 58 BPM | TEMPERATURE: 97.8 F | SYSTOLIC BLOOD PRESSURE: 154 MMHG | RESPIRATION RATE: 16 BRPM | DIASTOLIC BLOOD PRESSURE: 62 MMHG

## 2024-05-01 DIAGNOSIS — I89.0 LYMPHEDEMA OF BOTH LOWER EXTREMITIES: ICD-10-CM

## 2024-05-01 DIAGNOSIS — L97.322 VENOUS STASIS ULCER OF LEFT ANKLE WITH FAT LAYER EXPOSED WITH VARICOSE VEINS (HCC): ICD-10-CM

## 2024-05-01 DIAGNOSIS — E66.01 CLASS 3 SEVERE OBESITY DUE TO EXCESS CALORIES WITH SERIOUS COMORBIDITY AND BODY MASS INDEX (BMI) OF 45.0 TO 49.9 IN ADULT (HCC): Primary | ICD-10-CM

## 2024-05-01 DIAGNOSIS — E11.65 TYPE 2 DIABETES MELLITUS WITH HYPERGLYCEMIA, WITHOUT LONG-TERM CURRENT USE OF INSULIN (HCC): ICD-10-CM

## 2024-05-01 DIAGNOSIS — I83.023 VENOUS STASIS ULCER OF LEFT ANKLE WITH FAT LAYER EXPOSED WITH VARICOSE VEINS (HCC): ICD-10-CM

## 2024-05-01 DIAGNOSIS — L97.312 VENOUS STASIS ULCER OF RIGHT ANKLE WITH FAT LAYER EXPOSED WITH VARICOSE VEINS (HCC): ICD-10-CM

## 2024-05-01 DIAGNOSIS — I83.013 VENOUS STASIS ULCER OF RIGHT ANKLE WITH FAT LAYER EXPOSED WITH VARICOSE VEINS (HCC): ICD-10-CM

## 2024-05-01 PROCEDURE — 29581 APPL MULTLAYER CMPRN SYS LEG: CPT

## 2024-05-01 PROCEDURE — 99212 OFFICE O/P EST SF 10 MIN: CPT

## 2024-05-01 RX ORDER — CLOBETASOL PROPIONATE 0.5 MG/G
OINTMENT TOPICAL ONCE
OUTPATIENT
Start: 2024-05-01 | End: 2024-05-01

## 2024-05-01 RX ORDER — TRIAMCINOLONE ACETONIDE 1 MG/G
OINTMENT TOPICAL ONCE
OUTPATIENT
Start: 2024-05-01 | End: 2024-05-01

## 2024-05-01 RX ORDER — BACITRACIN ZINC AND POLYMYXIN B SULFATE 500; 1000 [USP'U]/G; [USP'U]/G
OINTMENT TOPICAL ONCE
OUTPATIENT
Start: 2024-05-01 | End: 2024-05-01

## 2024-05-01 RX ORDER — LIDOCAINE HYDROCHLORIDE 40 MG/ML
SOLUTION TOPICAL ONCE
OUTPATIENT
Start: 2024-05-01 | End: 2024-05-01

## 2024-05-01 RX ORDER — LIDOCAINE HYDROCHLORIDE 20 MG/ML
JELLY TOPICAL ONCE
OUTPATIENT
Start: 2024-05-01 | End: 2024-05-01

## 2024-05-01 RX ORDER — BETAMETHASONE DIPROPIONATE 0.5 MG/G
CREAM TOPICAL ONCE
OUTPATIENT
Start: 2024-05-01 | End: 2024-05-01

## 2024-05-01 RX ORDER — GENTAMICIN SULFATE 1 MG/G
OINTMENT TOPICAL ONCE
OUTPATIENT
Start: 2024-05-01 | End: 2024-05-01

## 2024-05-01 RX ORDER — LIDOCAINE 50 MG/G
OINTMENT TOPICAL ONCE
OUTPATIENT
Start: 2024-05-01 | End: 2024-05-01

## 2024-05-01 RX ORDER — LIDOCAINE HYDROCHLORIDE 20 MG/ML
JELLY TOPICAL ONCE
Status: DISCONTINUED | OUTPATIENT
Start: 2024-05-01 | End: 2024-05-02 | Stop reason: HOSPADM

## 2024-05-01 RX ORDER — LIDOCAINE 40 MG/G
CREAM TOPICAL ONCE
OUTPATIENT
Start: 2024-05-01 | End: 2024-05-01

## 2024-05-01 RX ORDER — SODIUM CHLOR/HYPOCHLOROUS ACID 0.033 %
SOLUTION, IRRIGATION IRRIGATION ONCE
OUTPATIENT
Start: 2024-05-01 | End: 2024-05-01

## 2024-05-01 RX ORDER — GINSENG 100 MG
CAPSULE ORAL ONCE
OUTPATIENT
Start: 2024-05-01 | End: 2024-05-01

## 2024-05-01 RX ORDER — IBUPROFEN 200 MG
TABLET ORAL ONCE
OUTPATIENT
Start: 2024-05-01 | End: 2024-05-01

## 2024-05-01 NOTE — PROGRESS NOTES
[facility]   Progress Note and Procedure Note      Theresa Crow  MEDICAL RECORD NUMBER:  [id number]  AGE: 79 y.o.   GENDER: female  : 1945  EPISODE DATE:  2024    Subjective:     Chief Complaint   Patient presents with    Wound Check     BLE         HISTORY of PRESENT ILLNESS HPI     Theresa Crow is a 79 y.o. female who presents today for wound/ulcer evaluation.     History of Wound Context: new return for VLU recidivism, last visit 3/2024 after healing of BLE venous stasis ulcerations. BLE with new ulcerations. Was wearing compression stockings, not working well for her as she has such difficulty with donning/doffing and several of the ulcerations are from scratching herself in the process and she states it takes her over 15 min per leg to don or doff.    Interval history: wounds closed today, but tissue is very thin/new. Will do multi layer wrap again this week and plan to have her go to her lymphedema clinic appt and the wraps can be removed there. She has not yet received the compression garments ordered, but expects to receive any day now.     Wound/Ulcer Pain Timing/Severity: constant  Quality of pain: aching, burning  Severity:  2 / 10   Modifying Factors: Pain is relieved/improved with rest  Associated Signs/Symptoms: edema, erythema, and drainage     Wound/Ulcer Identification:  Ulcer Type: venous  Contributing Factors: edema and venous stasis             PAST MEDICAL HISTORY        Diagnosis Date    Allergic rhinitis     Arthritis     Generalized    Borderline diabetes     Caffeine use     2 coffee day    COPD (chronic obstructive pulmonary disease) (HCC)     EARLY STAGE    GERD (gastroesophageal reflux disease)     Kidney stones     Mumps     MVP (mitral valve prolapse)     Nausea & vomiting     Palpitations     PONV (postoperative nausea and vomiting)     Ringing in ears     Schamberg's disease     Thoracic outlet syndrome     Type II or unspecified

## 2024-05-01 NOTE — PROGRESS NOTES
Multilayer Compression Wrap   (Not Unna) Below the Knee    NAME:  Theresa Crow  YOB: 1945  MEDICAL RECORD NUMBER:  [id number]  DATE:  5/1/2024    Multilayer compression wrap: Removed old Multilayer wrap if indicated and wash leg with mild soap/water.  Applied moisturizing agent to dry skin as needed.   Applied primary and secondary dressing as ordered.  Applied multilayered dressing below the knee to right lower leg.  Applied multilayered dressing below the knee to left lower leg.  Instructed patient/caregiver not to remove dressing and to keep it clean and dry.   Instructed patient/caregiver on complications to report to provider, such as pain, numbness in toes, heavy drainage, and slippage of dressing.  Instructed patient on purpose of compression dressing and on activity and exercise recommendations.      Electronically signed by Kevin Car RN on 5/1/2024 at 2:29 PM

## 2024-05-06 ENCOUNTER — HOSPITAL ENCOUNTER (OUTPATIENT)
Dept: OCCUPATIONAL THERAPY | Age: 79
Setting detail: THERAPIES SERIES
Discharge: HOME | End: 2024-05-06
Payer: MEDICARE

## 2024-05-06 ENCOUNTER — HOSPITAL ENCOUNTER (OUTPATIENT)
Dept: WOUND CARE | Age: 79
Discharge: HOME | End: 2024-05-06
Payer: MEDICARE

## 2024-05-06 VITALS
WEIGHT: 268 LBS | HEIGHT: 65 IN | RESPIRATION RATE: 18 BRPM | BODY MASS INDEX: 44.65 KG/M2 | DIASTOLIC BLOOD PRESSURE: 63 MMHG | TEMPERATURE: 97.8 F | HEART RATE: 57 BPM | SYSTOLIC BLOOD PRESSURE: 107 MMHG

## 2024-05-06 DIAGNOSIS — L97.322 VENOUS STASIS ULCER OF LEFT ANKLE WITH FAT LAYER EXPOSED WITH VARICOSE VEINS (HCC): ICD-10-CM

## 2024-05-06 DIAGNOSIS — I83.023 VENOUS STASIS ULCER OF LEFT ANKLE WITH FAT LAYER EXPOSED WITH VARICOSE VEINS (HCC): ICD-10-CM

## 2024-05-06 DIAGNOSIS — I83.013 VENOUS STASIS ULCER OF RIGHT ANKLE WITH FAT LAYER EXPOSED WITH VARICOSE VEINS (HCC): ICD-10-CM

## 2024-05-06 DIAGNOSIS — E11.65 TYPE 2 DIABETES MELLITUS WITH HYPERGLYCEMIA, WITHOUT LONG-TERM CURRENT USE OF INSULIN (HCC): ICD-10-CM

## 2024-05-06 DIAGNOSIS — I89.0 LYMPHEDEMA OF BOTH LOWER EXTREMITIES: ICD-10-CM

## 2024-05-06 DIAGNOSIS — L97.312 VENOUS STASIS ULCER OF RIGHT ANKLE WITH FAT LAYER EXPOSED WITH VARICOSE VEINS (HCC): ICD-10-CM

## 2024-05-06 DIAGNOSIS — E66.01 CLASS 3 SEVERE OBESITY DUE TO EXCESS CALORIES WITH SERIOUS COMORBIDITY AND BODY MASS INDEX (BMI) OF 45.0 TO 49.9 IN ADULT (HCC): Primary | ICD-10-CM

## 2024-05-06 PROCEDURE — 99212 OFFICE O/P EST SF 10 MIN: CPT

## 2024-05-06 PROCEDURE — 97165 OT EVAL LOW COMPLEX 30 MIN: CPT

## 2024-05-06 PROCEDURE — 97535 SELF CARE MNGMENT TRAINING: CPT

## 2024-05-06 RX ORDER — LIDOCAINE HYDROCHLORIDE 20 MG/ML
JELLY TOPICAL ONCE
Status: DISCONTINUED | OUTPATIENT
Start: 2024-05-06 | End: 2024-05-07 | Stop reason: HOSPADM

## 2024-05-06 RX ORDER — LIDOCAINE 40 MG/G
CREAM TOPICAL ONCE
OUTPATIENT
Start: 2024-05-06 | End: 2024-05-06

## 2024-05-06 RX ORDER — LIDOCAINE HYDROCHLORIDE 20 MG/ML
JELLY TOPICAL ONCE
OUTPATIENT
Start: 2024-05-06 | End: 2024-05-06

## 2024-05-06 RX ORDER — LIDOCAINE 50 MG/G
OINTMENT TOPICAL ONCE
OUTPATIENT
Start: 2024-05-06 | End: 2024-05-06

## 2024-05-06 RX ORDER — IBUPROFEN 200 MG
TABLET ORAL ONCE
OUTPATIENT
Start: 2024-05-06 | End: 2024-05-06

## 2024-05-06 RX ORDER — BETAMETHASONE DIPROPIONATE 0.5 MG/G
CREAM TOPICAL ONCE
OUTPATIENT
Start: 2024-05-06 | End: 2024-05-06

## 2024-05-06 RX ORDER — TRIAMCINOLONE ACETONIDE 1 MG/G
OINTMENT TOPICAL ONCE
OUTPATIENT
Start: 2024-05-06 | End: 2024-05-06

## 2024-05-06 RX ORDER — LIDOCAINE HYDROCHLORIDE 40 MG/ML
SOLUTION TOPICAL ONCE
OUTPATIENT
Start: 2024-05-06 | End: 2024-05-06

## 2024-05-06 RX ORDER — GINSENG 100 MG
CAPSULE ORAL ONCE
OUTPATIENT
Start: 2024-05-06 | End: 2024-05-06

## 2024-05-06 RX ORDER — GENTAMICIN SULFATE 1 MG/G
OINTMENT TOPICAL ONCE
OUTPATIENT
Start: 2024-05-06 | End: 2024-05-06

## 2024-05-06 RX ORDER — SODIUM CHLOR/HYPOCHLOROUS ACID 0.033 %
SOLUTION, IRRIGATION IRRIGATION ONCE
OUTPATIENT
Start: 2024-05-06 | End: 2024-05-06

## 2024-05-06 RX ORDER — CLOBETASOL PROPIONATE 0.5 MG/G
OINTMENT TOPICAL ONCE
OUTPATIENT
Start: 2024-05-06 | End: 2024-05-06

## 2024-05-06 RX ORDER — BACITRACIN ZINC AND POLYMYXIN B SULFATE 500; 1000 [USP'U]/G; [USP'U]/G
OINTMENT TOPICAL ONCE
OUTPATIENT
Start: 2024-05-06 | End: 2024-05-06

## 2024-05-06 ASSESSMENT — PAIN SCALES - GENERAL: PAINLEVEL_OUTOF10: 0

## 2024-05-06 NOTE — CONSULTS
wound, you may need to see wound care prior to starting treatment with your lymphedema therapist.     Sleep Position  Sleep in a regular bed. If you are able to elevate legs with pillows in bed, this is even more helpful. If you cannot sleep in a regular bed, make sure that your legs are propped up. Sleeping with your legs down with feet on the floor can be a major contributor to swelling and may cause therapy to be less effective.    Elevation  When you are sitting or relaxing at home, be sure to prop your legs up.     Movement  Move as much as you are able to. Moving your legs helps move swelling. This can be as simple as getting up every hour to walk a short distance in your home or completing ankle pumps while sitting.     Diet  Limit salt, sugary drinks, and processed foods. Start eating more protein, fresh foods, and drink more water.     Shoes/Socks  Start wearing shoes daily if you are not doing so already. Shoes can help control swelling. Wear shoes that provide good coverage over the entire foot. Avoid socks that have an elastic band at the opening. Diabetic socks are best.     Identify a [place]  Find someone who will be able to help you long term at home. Depending on your specific needs, you may need someone to help you with your compression garments in the future.       Response to treatment: Pt verbalizes and is agreeable to the instructions/ POC established at today's evaluation.        PLAN FOR NEXT VISIT:   Train in use of compression for home program  Skin inspection  Pump trial    Action steps to be completed by therapist:     Action steps to be completed by patient: f/u with wound care to address unna boots      Lymphedema Stage per [facility] Guidelines    [] 0: Subclinical with no evidence on physical exam, pt may be feeling subjective symptoms  [] 1:  Early onset, swelling/heaviness, pitting edema, subsides with limb elevation  [x] 2:  More advanced, fibrosis resulting in non-pitting edema, does not

## 2024-05-06 NOTE — DISCHARGE INSTRUCTIONS
[facility]ENTER -Phone: [phone] Fax: [phone]   Visit  Discharge Instructions per Larissa Sepulveda CNP Orders    DATE: 5/6/2024     Home Care:      SUPPLIES ORDERED THRU:      Wound Location: Bilateral Lower Legs     Cleanse as normal     Dressing Orders: Moisturize legs with non-fragrant moisturizer. Wear Velcro wraps (on during the day and off at night)     Frequency: daily     Additional Orders: Increase protein to diet (meat, cheese, eggs, fish, peanut butter, nuts and beans)  ELEVATE LEGS AS MUCH AS POSSIBLE    Your next appointment with Wound Care Center is as needed     (Please note your next appointment above and if you are unable to keep, kindly give a 24 hour notice. Thank you.)  If more than 15 min late we cannot guarantee you will be seen due to clinician schedule  Per Policy, Excessive cancellation will call for dismissal from program.     If you experience any of the following, please call the Wound Care Center during business hours:  [phone]     * Increase in Pain  * Temperature over 101  * Increase in drainage from your wound  * Drainage with a foul odor  * Bleeding  * Increase in swelling  * Need for compression bandage changes due to slippage, breakthrough drainage.     If you need medical attention outside of the business hours of the Wound Care Centers please contact your PCP or go to the an urgent care or emergency department     The information contained in the After Visit Summary has been reviewed with me, the patient and/or responsible adult, by my health care provider(s). I had the opportunity to ask questions regarding this information. I have elected to receive;      []After Visit Summary  [x]Comprehensive Discharge Instruction      Patient signature______________________________________Date:________  Electronically signed by Tasha Heath RN on 5/6/2024 at 2:20 PM

## 2024-05-06 NOTE — PLAN OF CARE
05/06/24 1417   Anesthetic   Anesthetic None   Right Leg Edema Point of Measurement   Great toe to forefoot 10 cm   Heel to ankle 10 cm   Heel to calf 30   Leg circumference 54 cm   Ankle circumference 27.5 cm   Foot circumference 24.6 cm   Compression Therapy Compression ordered;3 layer compression wrap   Left Leg Edema Point of Measurement   Great toe to forefoot 10 cm   Heel to ankle 10 cm   Heel to calf 30   Leg circumference 50.5 cm   Ankle circumference 26.4 cm   Foot circumference 22 cm   Compression Therapy Compression ordered;3 layer compression wrap   Peripheral Vascular   Peripheral Vascular (WDL) X   Edema Right lower extremity;Left lower extremity   RLE Edema +1;Non-pitting   LLE Edema Non-pitting;+1   RLE Neurovascular Assessment   Capillary Refill Less than/Equal to 3 seconds   Color Yellow-Brown/Hemosiderin Staining   Temperature Warm   RLE Sensation  Decreased   R Post Tibial Pulse +1 (Weak)   R Pedal Pulse +2   Varicose Veins Present No   R Calf Tenderness  Negative   Claudication Assessment None   LLE Neurovascular Assessment   Capillary Refill Less than/Equal to 3 seconds   Color Pink   Temperature Warm   LLE Sensation  Pain   L Post Tibial Pulse +1 (Weak)   L Pedal Pulse +2   Varicose Veins Present No   L Calf Tenderness Negative   Claudication Assessment None   Musculoskeletal   Musculoskeletal (WDL) WDL   Foot Assessment   Foot Assessment WDL   Left Foot Assessment   Other Deformity N   Prior Foot Ulcer N   Charcot Joint N   Prior Amputation N   Right Foot Assessment   Other Deformity N   Prior Foot Ulcer N   Charcot Joint N   Prior Amputation N   Toe Nail Assessment   Toe Nail Assessment X   Left Nail Assessment   Thick Y   Discolored Y   Deformed N   Improper Length and Hygiene N   Right Nail Assessment   Thick Y   Discolored Y   Deformed N   Improper Length and Hygiene N   Wound 04/25/24 Pretibial Right;Lower #1 Cluster   Date First Assessed/Time First Assessed: 04/25/24 1411   Wound

## 2024-05-20 ENCOUNTER — HOSPITAL ENCOUNTER (OUTPATIENT)
Dept: OCCUPATIONAL THERAPY | Age: 79
Setting detail: THERAPIES SERIES
Discharge: HOME | End: 2024-05-20
Payer: MEDICARE

## 2024-05-20 PROCEDURE — 97535 SELF CARE MNGMENT TRAINING: CPT

## 2024-05-20 PROCEDURE — 97140 MANUAL THERAPY 1/> REGIONS: CPT

## 2024-05-20 NOTE — FLOWSHEET NOTE
22.0   Supramalleolar  (ankle)    17 32.0 31.5   Distal Calf     25 33.0 30.0   Mid Calf     35 47.0 49.0   Proximal Calf    45 54.0 54.5   Knee     60 61.5 65.0   Distal thigh    70 67.0 72.5   Mid thigh  80 67.0 69.0   Proximal Thigh       5/20/2024 Baseline for treatment  383.5 393.5   Initial Total 5/6/2024:   Initial measurements with unaboots not at set cm points .0 375.5        Therapy Goals  STG - To be addressed within 10 visits     Pt will be educated on lymphedema risk reduction practices to reduce the risk of infection, progression of disease, exacerbations, and functional morbidity with use of B LE during functional mobility.      Pt will demonstrate compliance with skin care routine for improved overall skin integrity to decrease risk of wounds, infection, and hospitalization.       Pt will demonstrate independence with decongestive exercise program in order to promote healthy lymphatic flow by maintaining/improving functional ROM needed during ADL's.     Pt will demonstrate 70 % accuracy with SELF-MLD sequence in order to promote independence with home programming to functional reduce swelling to B LE for increased ease and independence with ADL.      Pt/caregiver will demonstrate proper technique with bandaging to aid in reduction of swelling for active participation in pt's POC.      LTG - To be addressed within 10 visits  Pt/Caregiver will demonstrate independence with donning/doffing and wearing schedule for compression garments/devices to reduce the risk of infection, progression of disease, exacerbations, and functional morbidity with use of B LE during ADL.      Pt to be compliant with CDT in order to reduce edema in the B LE by 15+ cm total per limb for increased ease and independence with lower body ADL tasks.       Pt will demonstrate independence with home programming including vasopneumatic pump, compression garments, skin care,  for improved QOL while managing chronic lymphatic

## 2024-05-22 ENCOUNTER — HOSPITAL ENCOUNTER (OUTPATIENT)
Dept: OCCUPATIONAL THERAPY | Age: 79
Setting detail: THERAPIES SERIES
Discharge: HOME | End: 2024-05-22
Payer: MEDICARE

## 2024-05-22 PROCEDURE — 97140 MANUAL THERAPY 1/> REGIONS: CPT

## 2024-05-22 PROCEDURE — 97535 SELF CARE MNGMENT TRAINING: CPT

## 2024-05-22 NOTE — FLOWSHEET NOTE
weeks  Comments:    Pt educated that since getting home health insurance may not cover this evaluation.  Pt understood and asked to proceed with evaluation.  Pt was offered ability to reschedule until after discharged from home health.  Pt reported last HH visit is Thursday this week with just nursing services only.      Pt arrives with stockings and Medi Juxtafit Basic lower leg wraps with compressive undersock.  Untrained in use.  May not fit the pt.  Educated pt on how to do     Pt is educated on handouts which include information regarding lymphedema etiology, lymphedema prognosis, expectations during treatment, and basic home instructions. Writer educates on an impaired lymphatic system vs. a healthy lymphatic system and how it relates to swelling and skin integrity       [x] Progressing toward goals.   [x] Patient would continue to benefit from skilled occupational therapy services in order to: Decrease edema that has accumulated in the affected area(s), decrease risk of infection, improve limb ROM, increase ease and independence with ADL, educate on long term management of condition, and improve overall quality of life.   [] No change.  [] Treatment hold:   [] No further treatment/discharge  [] Other:    Pt. Education:  [x] Yes  [] No  [x] Reviewed Prior HEP/Ed  Method of Education: [] Verbal  [] Demo  [] Written  Comprehension of Education:  [x] Verbalizes/demonstrates understanding  [] Needs review  [] No understanding  Rehab potential:  [x] Good [] Fair [] Poor [] With assist from family/caregiver    Circumferential Measurements  Measurements taken from nail base of D2 digit.  Measurements (cm) cm Right Left   Dorsum     9 22.0 22.0   Supramalleolar  (ankle)    17 31.5 30.5   Distal Calf     25 33.0 30.0   Mid Calf     35 48.5 48.0   Proximal Calf    45 56.0 52.5   Knee     60 62.0 64.0   Distal thigh    70 70.0 70.0   Mid thigh  80 68.0 66.0   Proximal Thigh       5/22/2024  391.0 383.0   5/20/2024

## 2024-05-29 ENCOUNTER — APPOINTMENT (OUTPATIENT)
Dept: OCCUPATIONAL THERAPY | Age: 79
End: 2024-05-29
Payer: MEDICARE

## 2024-06-03 ENCOUNTER — APPOINTMENT (OUTPATIENT)
Dept: OCCUPATIONAL THERAPY | Age: 79
End: 2024-06-03
Payer: COMMERCIAL

## 2024-06-05 ENCOUNTER — HOSPITAL ENCOUNTER (OUTPATIENT)
Dept: OCCUPATIONAL THERAPY | Age: 79
Setting detail: THERAPIES SERIES
Discharge: HOME | End: 2024-06-05
Payer: MEDICARE

## 2024-06-05 PROCEDURE — 97535 SELF CARE MNGMENT TRAINING: CPT

## 2024-06-05 NOTE — FLOWSHEET NOTE
TREATMENT LOCATION:   [x] [facility]  Outpatient Rehabilitation &  Therapy  [address], [address]  P: [phone]  F: [phone] [] [facility]   Outpatient Rehabilitation &  Therapy  [address]  P: [phone]  F: [phone] [] [facility]  Outpatient Rehabilitation &  Therapy  [address]   P: [phone]  F: [phone]        Lymphedema Services - Treatment Note of the B Lower Extremity    Visit# / total visits: 4/5 scheduled; Progress note due at visit 10 per POC.  (Certification Dates: 2024 - 2024)    Date:  2024  Patient: Theresa Crow  : 1945             MRN: [id number]  Referring Provider: Delos Reyes, Arthur, MD       Phone: [phone]  Fax: [phone]  nsurance: MEDICARE  (ID:[id number] ) -No prior auth, BOMN, Dual coverage.  Secondary:  (ID: [id number])  Medical Diagnosis: I89.0   Rehab Codes: I89.0, Z91.81 - history of falling, I87.2 - venous insufficiency (chronic) (peripheral)  Onset Date: 3/26/2024    Demographic info for garment/pump VOB:   [address] [place] [id number] [phone]  2024 pump order to [employer]. Medicare pays 80% her secondary is out of network.  Would owe $389.70    Overview of Evaluation dated 2024: Patient is a 79 year old female referred to the Lymphedema Clinic with a diagnosis of bilateral Lower Extremity I89.0 lymphedema. Pt with a hx of fall, cellulitis, wounds, vascular procedures for leaking valves.  Onset 20+ year ago.  Wounds to B LE s/p cellulitis since 2023.  Wound care since 3/2024.      Arrived in unna boots.  Educated pt due to length of evaluation no time available for removing the unna boots at this OT lymphedema evaluation and advised to f/u with Wound care..       Pt educated that since getting home health insurance may not cover this evaluation.  Pt understood and asked to proceed with

## 2024-06-26 ENCOUNTER — CLINICAL DOCUMENTATION (OUTPATIENT)
Dept: OCCUPATIONAL THERAPY | Age: 79
End: 2024-06-26

## 2024-06-26 NOTE — DISCHARGE SUMMARY
TREATMENT LOCATION:   [x][facility]  Outpatient Rehabilitation &  Therapy  [address], [address]  P: [phone]  F: [phone] [][facility]  Outpatient Rehabilitation &  Therapy  [address]  P: [phone]  F: [phone] [][facility]  Outpatient Rehabilitation &  Therapy  [address]  P: [phone]  F: [phone]     Lymphedema Therapy Discharge Note    Date: 2024      Patient: Theresa Crow  : 1945  MRN: [id number] Referring Provider: Delos Reyes, Arthur, MD       Phone: [phone]              Fax: [phone]  nsurance: MEDICARE  (ID:[id number] ) -No prior auth, BOMN, Dual coverage.  Secondary:  (ID: [id number])  Medical Diagnosis: I89.0   Rehab Codes: I89.0, Z91.81 - history of falling, I87.2 - venous insufficiency (chronic) (peripheral)  Onset Date: 3/26/2024      Total visits attended: 4        Cancels/No shows: 0  Date of initial visit: 2024                Date of final visit: 2024    Overview of Evaluation dated 2024: Patient is a 79 year old female referred to the Lymphedema Clinic with a diagnosis of bilateral Lower Extremity I89.0 lymphedema. Pt with a hx of fall, cellulitis, wounds, vascular procedures for leaking valves.  Onset 20+ year ago.  Wounds to B LE s/p cellulitis since 2023.  Wound care since 3/2024.      Arrived in unna boots.  Educated pt due to length of evaluation no time available for removing the unna boots at this OT lymphedema evaluation and advised to f/u with Wound care..       Pt educated that since getting home health insurance may not cover this evaluation.  Pt understood and asked to proceed with evaluation.  Pt was offered ability to reschedule until after discharged from home health.  Pt reported last HH visit is Thursday this week with just nursing services only.      Pt arrives with stockings and Medi Juxtafit Basic

## 2024-07-22 NOTE — DISCHARGE INSTRUCTIONS
[facility]OUND [facility]ARE [facility]ENTER -Phone: [phone] Fax: [phone]    Visit  Discharge Instructions / Physician Orders     DATE: 7/25/2024     Home Care: [facility]     SUPPLIES ORDERED THRU: NA     Wound Location: Bilateral Lower Legs     Cleanse with: Liquid antibacterial soap and water, rinse well      Dressing Orders: Calamine Layer, 3 Layer Compression Wrap (ok for wrap to be removed at lymphedema clinic 5/6/24)     Frequency: Keep Dry and Intact     Additional Orders: Increase protein to diet (meat, cheese, eggs, fish, peanut butter, nuts and beans)  ELEVATE LEGS AS MUCH AS POSSIBLE  3/26/24-Lymphedma referral  4/25/24- Velcro Compression Wraps Ordered     Your next appointment with Wound Care Center is as needed     (Please note your next appointment above and if you are unable to keep, kindly give a 24 hour notice. Thank you.)  If more than 15 min late we cannot guarantee you will be seen due to clinician schedule  Per Policy, Excessive cancellation will call for dismissal from program.     If you experience any of the following, please call the Wound Care Center during business hours:  [phone]  Your Phone call may be forwarded to [place] [facility] during business hours that [place] is closed.     * Increase in Pain  * Temperature over 101  * Increase in drainage from your wound  * Drainage with a foul odor  * Bleeding  * Increase in swelling  * Need for compression bandage changes due to slippage, breakthrough drainage.     If you need medical attention outside of the business hours of the Wound Care Centers please contact your PCP or go to the nearest emergency room.     The information contained in the After Visit Summary has been reviewed with me, the patient and/or responsible adult, by my health care provider(s). I had the opportunity to ask questions regarding this information. I have elected to receive;      []After Visit Summary  [x]Comprehensive Discharge Instruction

## 2024-07-25 ENCOUNTER — HOSPITAL ENCOUNTER (EMERGENCY)
Age: 79
Discharge: HOME | End: 2024-07-26
Attending: STUDENT IN AN ORGANIZED HEALTH CARE EDUCATION/TRAINING PROGRAM
Payer: MEDICARE

## 2024-07-25 ENCOUNTER — HOSPITAL ENCOUNTER (OUTPATIENT)
Dept: WOUND CARE | Age: 79
Discharge: HOME | End: 2024-07-25

## 2024-07-25 DIAGNOSIS — G89.29 ACUTE EXACERBATION OF CHRONIC LOW BACK PAIN: Primary | ICD-10-CM

## 2024-07-25 DIAGNOSIS — N30.00 ACUTE CYSTITIS WITHOUT HEMATURIA: ICD-10-CM

## 2024-07-25 DIAGNOSIS — M54.50 ACUTE EXACERBATION OF CHRONIC LOW BACK PAIN: Primary | ICD-10-CM

## 2024-07-25 PROCEDURE — 99284 EMERGENCY DEPT VISIT MOD MDM: CPT

## 2024-07-25 PROCEDURE — 96375 TX/PRO/DX INJ NEW DRUG ADDON: CPT

## 2024-07-25 PROCEDURE — 83690 ASSAY OF LIPASE: CPT

## 2024-07-25 PROCEDURE — 85025 COMPLETE CBC W/AUTO DIFF WBC: CPT

## 2024-07-25 PROCEDURE — 80053 COMPREHEN METABOLIC PANEL: CPT

## 2024-07-25 PROCEDURE — 96374 THER/PROPH/DIAG INJ IV PUSH: CPT

## 2024-07-25 ASSESSMENT — PAIN DESCRIPTION - FREQUENCY: FREQUENCY: CONTINUOUS

## 2024-07-25 ASSESSMENT — PAIN DESCRIPTION - LOCATION: LOCATION: BACK

## 2024-07-25 ASSESSMENT — PAIN SCALES - GENERAL: PAINLEVEL_OUTOF10: 10

## 2024-07-25 ASSESSMENT — PAIN DESCRIPTION - ONSET: ONSET: PROGRESSIVE

## 2024-07-25 ASSESSMENT — PAIN DESCRIPTION - ORIENTATION: ORIENTATION: LOWER

## 2024-07-26 ENCOUNTER — APPOINTMENT (OUTPATIENT)
Dept: CT IMAGING | Age: 79
End: 2024-07-26
Payer: MEDICARE

## 2024-07-26 VITALS
BODY MASS INDEX: 43.32 KG/M2 | HEIGHT: 65 IN | DIASTOLIC BLOOD PRESSURE: 52 MMHG | TEMPERATURE: 98.8 F | SYSTOLIC BLOOD PRESSURE: 113 MMHG | RESPIRATION RATE: 16 BRPM | OXYGEN SATURATION: 92 % | HEART RATE: 75 BPM | WEIGHT: 260 LBS

## 2024-07-26 LAB
ALBUMIN SERPL-MCNC: 3.9 G/DL (ref 3.5–5.2)
ALP SERPL-CCNC: 125 U/L (ref 35–104)
ALT SERPL-CCNC: 8 U/L (ref 5–33)
ANION GAP SERPL CALCULATED.3IONS-SCNC: 17 MMOL/L (ref 9–17)
AST SERPL-CCNC: 18 U/L
BACTERIA URNS QL MICRO: ABNORMAL
BASOPHILS # BLD: 0.05 K/UL (ref 0–0.2)
BASOPHILS NFR BLD: 0 % (ref 0–2)
BILIRUB SERPL-MCNC: 0.9 MG/DL (ref 0.3–1.2)
BILIRUB UR QL STRIP: NEGATIVE
BUN SERPL-MCNC: 39 MG/DL (ref 8–23)
BUN/CREAT SERPL: 30 (ref 9–20)
CALCIUM SERPL-MCNC: 10.5 MG/DL (ref 8.6–10.4)
CASTS #/AREA URNS LPF: ABNORMAL /LPF
CASTS #/AREA URNS LPF: ABNORMAL /LPF
CHLORIDE SERPL-SCNC: 96 MMOL/L (ref 98–107)
CLARITY UR: CLEAR
CO2 SERPL-SCNC: 26 MMOL/L (ref 20–31)
COLOR UR: YELLOW
CREAT SERPL-MCNC: 1.3 MG/DL (ref 0.5–0.9)
EOSINOPHIL # BLD: 0.28 K/UL (ref 0–0.44)
EOSINOPHILS RELATIVE PERCENT: 2 % (ref 1–4)
EPI CELLS #/AREA URNS HPF: ABNORMAL /HPF (ref 0–5)
ERYTHROCYTE [DISTWIDTH] IN BLOOD BY AUTOMATED COUNT: 12.9 % (ref 11.8–14.4)
GFR, ESTIMATED: 42 ML/MIN/1.73M2
GLUCOSE SERPL-MCNC: 128 MG/DL (ref 70–99)
GLUCOSE UR STRIP-MCNC: NEGATIVE MG/DL
HCT VFR BLD AUTO: 38 % (ref 36.3–47.1)
HGB BLD-MCNC: 12.4 G/DL (ref 11.9–15.1)
HGB UR QL STRIP.AUTO: ABNORMAL
IMM GRANULOCYTES # BLD AUTO: 0.05 K/UL (ref 0–0.3)
IMM GRANULOCYTES NFR BLD: 0 %
KETONES UR STRIP-MCNC: NEGATIVE MG/DL
LEUKOCYTE ESTERASE UR QL STRIP: NEGATIVE
LIPASE SERPL-CCNC: 20 U/L (ref 13–60)
LYMPHOCYTES NFR BLD: 1.04 K/UL (ref 1.1–3.7)
LYMPHOCYTES RELATIVE PERCENT: 9 % (ref 24–43)
MCH RBC QN AUTO: 30.3 PG (ref 25.2–33.5)
MCHC RBC AUTO-ENTMCNC: 32.6 G/DL (ref 28.4–34.8)
MCV RBC AUTO: 92.9 FL (ref 82.6–102.9)
MONOCYTES NFR BLD: 0.9 K/UL (ref 0.1–1.2)
MONOCYTES NFR BLD: 8 % (ref 3–12)
NEUTROPHILS NFR BLD: 81 % (ref 36–65)
NEUTS SEG NFR BLD: 9.39 K/UL (ref 1.5–8.1)
NITRITE UR QL STRIP: NEGATIVE
NRBC BLD-RTO: 0 PER 100 WBC
PH UR STRIP: 5.5 [PH] (ref 5–8)
PLATELET # BLD AUTO: 425 K/UL (ref 138–453)
PMV BLD AUTO: 11.3 FL (ref 8.1–13.5)
POTASSIUM SERPL-SCNC: 3.7 MMOL/L (ref 3.7–5.3)
PROT SERPL-MCNC: 8.3 G/DL (ref 6.4–8.3)
PROT UR STRIP-MCNC: NEGATIVE MG/DL
RBC # BLD AUTO: 4.09 M/UL (ref 3.95–5.11)
RBC #/AREA URNS HPF: ABNORMAL /HPF (ref 0–2)
SODIUM SERPL-SCNC: 139 MMOL/L (ref 135–144)
SP GR UR STRIP: 1.02 (ref 1–1.03)
UROBILINOGEN UR STRIP-ACNC: NORMAL EU/DL (ref 0–1)
WBC #/AREA URNS HPF: ABNORMAL /HPF (ref 0–5)
WBC OTHER # BLD: 11.7 K/UL (ref 3.5–11.3)

## 2024-07-26 PROCEDURE — 6360000002 HC RX W HCPCS: Performed by: STUDENT IN AN ORGANIZED HEALTH CARE EDUCATION/TRAINING PROGRAM

## 2024-07-26 PROCEDURE — 6370000000 HC RX 637 (ALT 250 FOR IP): Performed by: STUDENT IN AN ORGANIZED HEALTH CARE EDUCATION/TRAINING PROGRAM

## 2024-07-26 PROCEDURE — 74176 CT ABD & PELVIS W/O CONTRAST: CPT

## 2024-07-26 PROCEDURE — 96375 TX/PRO/DX INJ NEW DRUG ADDON: CPT

## 2024-07-26 PROCEDURE — 96374 THER/PROPH/DIAG INJ IV PUSH: CPT

## 2024-07-26 PROCEDURE — 81001 URINALYSIS AUTO W/SCOPE: CPT

## 2024-07-26 RX ORDER — OXYCODONE HYDROCHLORIDE AND ACETAMINOPHEN 5; 325 MG/1; MG/1
1 TABLET ORAL ONCE
Status: COMPLETED | OUTPATIENT
Start: 2024-07-26 | End: 2024-07-26

## 2024-07-26 RX ORDER — CEPHALEXIN 500 MG/1
500 CAPSULE ORAL 2 TIMES DAILY
Qty: 6 CAPSULE | Refills: 0 | Status: SHIPPED | OUTPATIENT
Start: 2024-07-26 | End: 2024-07-29

## 2024-07-26 RX ORDER — CEPHALEXIN 500 MG/1
500 CAPSULE ORAL ONCE
Status: COMPLETED | OUTPATIENT
Start: 2024-07-26 | End: 2024-07-26

## 2024-07-26 RX ORDER — CEPHALEXIN 500 MG/1
500 CAPSULE ORAL 2 TIMES DAILY
Qty: 6 CAPSULE | Refills: 0 | Status: SHIPPED | OUTPATIENT
Start: 2024-07-26 | End: 2024-07-26

## 2024-07-26 RX ORDER — OXYCODONE HYDROCHLORIDE AND ACETAMINOPHEN 5; 325 MG/1; MG/1
1 TABLET ORAL EVERY 6 HOURS PRN
Qty: 15 TABLET | Refills: 0 | Status: SHIPPED | OUTPATIENT
Start: 2024-07-26 | End: 2024-07-31

## 2024-07-26 RX ORDER — ONDANSETRON 2 MG/ML
4 INJECTION INTRAMUSCULAR; INTRAVENOUS ONCE
Status: COMPLETED | OUTPATIENT
Start: 2024-07-26 | End: 2024-07-26

## 2024-07-26 RX ADMIN — CEPHALEXIN 500 MG: 500 CAPSULE ORAL at 05:53

## 2024-07-26 RX ADMIN — OXYCODONE HYDROCHLORIDE AND ACETAMINOPHEN 1 TABLET: 5; 325 TABLET ORAL at 03:32

## 2024-07-26 RX ADMIN — ONDANSETRON 4 MG: 2 INJECTION INTRAMUSCULAR; INTRAVENOUS at 00:20

## 2024-07-26 RX ADMIN — HYDROMORPHONE HYDROCHLORIDE 0.5 MG: 1 INJECTION, SOLUTION INTRAMUSCULAR; INTRAVENOUS; SUBCUTANEOUS at 00:20

## 2024-07-26 ASSESSMENT — PAIN SCALES - GENERAL
PAINLEVEL_OUTOF10: 10
PAINLEVEL_OUTOF10: 8

## 2024-07-26 ASSESSMENT — PAIN DESCRIPTION - LOCATION: LOCATION: BACK

## 2024-07-26 ASSESSMENT — PAIN DESCRIPTION - ORIENTATION: ORIENTATION: LOWER

## 2024-07-26 NOTE — DISCHARGE INSTRUCTIONS
Call today or tomorrow to follow up with Santiago Esquivel PA  in 7 days.    Use an ice pack or bag filled with ice and apply to the injured area 3 - 4 times a day for 15 - 20 minutes each time.  If the injury is older than 3 days, then use a heating pad to help relax the muscles in your back.    Use ibuprofen or Tylenol (unless prescribed medications that have Tylenol in it) for pain.  You can take over the counter Ibuprofen (advil) tablets (4 tablets every 8 hours or 3 tablets every 6 hours or 2 tablets every 4 hours)    Return to the Emergency Department for inability to move legs, worsening of pain, tingling / loss of sensation, any other care or concern.

## 2024-07-26 NOTE — ED NOTES
Pt arrived to ED via EMS with c/o lower back pain. Per pt report she has two bulging disks. Her pain began one week ago, however has progressively worsened since Saturday. EMS reported she was going to try to to come private auto however she was unable to walk to her vehicle by herself. Patient describes her pain as \"muscle spasms.\" EMS provided pt with an emesis bag as she has been experiencing nausea. Patient is A&Ox4, she is able to speak in full, clear sentences. She denies chest pain. Patient denies SOB, her respirations are equal and non-labored with no use of accessory muscles.     SPO2 and blood pressure monitoring initiated. Peripheral IV initiated. Blood obtained, labeled and sent to lab.

## 2024-07-29 NOTE — ED PROVIDER NOTES
as needed for Pain.Historical Med      ELIQUIS 5 MG TABS tablet TAKE 1 TABLET BY MOUTH TWICE  DAILY, Disp-180 tablet, R-3Please send a replace/new response with 90-Day Supply if appropriate to maximize member benefit. Requesting 1 year supply.Normal      metoprolol (LOPRESSOR) 100 MG tablet TAKE 1 TABLET TWICE A DAY, Disp-180 tablet, R-3Requesting 1 year supplyNormal      VENTOLIN  (90 Base) MCG/ACT inhaler DAWHistorical Med      SYNTHROID 137 MCG tablet Take 1 tablet by mouth Daily, DAWHistorical Med      Cholecalciferol (VITAMIN D3) 125 MCG (5000 UT) TABS Take 2,000 Units by mouth daily Historical Med      omeprazole (PRILOSEC) 40 MG capsule Take 1 capsule by mouth as neededHistorical Med      fluticasone-salmeterol (ADVAIR) 250-50 MCG/DOSE AEPB Inhale 1 puff into the lungs dailyHistorical Med      montelukast (SINGULAIR) 10 MG tablet Take 1 tablet by mouth nightlyHistorical Med      gemfibrozil (LOPID) 600 MG tablet Take 1 tablet by mouth dailyHistorical Med             ALLERGIES     is allergic to latex, other, adhesive tape, bactrim [sulfamethoxazole-trimethoprim], codeine, iodine, pcn [penicillins], and morphine.    FAMILY HISTORY     She indicated that her mother is . She indicated that her father is . She indicated that the status of her sister is unknown. She indicated that her child is alive.       SOCIAL HISTORY       Social History     Tobacco Use    Smoking status: Never    Smokeless tobacco: Never   Substance Use Topics    Alcohol use: No     Comment: rare    Drug use: No       PHYSICAL EXAM     INITIAL VITALS: BP (!) 113/52   Pulse 75   Temp 98.8 °F (37.1 °C) (Oral)   Resp 16   Ht 1.651 m (5' 5\")   Wt 117.9 kg (260 lb)   SpO2 92%   BMI 43.27 kg/m²    Physical Exam  Vitals and nursing note reviewed.   Constitutional:       General: She is not in acute distress.     Appearance: She is well-developed. She is not ill-appearing or toxic-appearing.      Comments: Speaking in

## 2024-10-03 ENCOUNTER — HOSPITAL ENCOUNTER (OUTPATIENT)
Dept: WOUND CARE | Age: 79
Discharge: HOME | End: 2024-10-03
Payer: MEDICARE

## 2024-10-03 VITALS
RESPIRATION RATE: 16 BRPM | TEMPERATURE: 96.2 F | SYSTOLIC BLOOD PRESSURE: 115 MMHG | DIASTOLIC BLOOD PRESSURE: 68 MMHG | HEART RATE: 71 BPM

## 2024-10-03 DIAGNOSIS — L97.322 VENOUS STASIS ULCER OF LEFT ANKLE WITH FAT LAYER EXPOSED WITH VARICOSE VEINS (HCC): ICD-10-CM

## 2024-10-03 DIAGNOSIS — E66.813 CLASS 3 SEVERE OBESITY DUE TO EXCESS CALORIES WITH SERIOUS COMORBIDITY AND BODY MASS INDEX (BMI) OF 45.0 TO 49.9 IN ADULT: Primary | ICD-10-CM

## 2024-10-03 DIAGNOSIS — E66.01 CLASS 3 SEVERE OBESITY DUE TO EXCESS CALORIES WITH SERIOUS COMORBIDITY AND BODY MASS INDEX (BMI) OF 45.0 TO 49.9 IN ADULT: Primary | ICD-10-CM

## 2024-10-03 DIAGNOSIS — I83.023 VENOUS STASIS ULCER OF LEFT ANKLE WITH FAT LAYER EXPOSED WITH VARICOSE VEINS (HCC): ICD-10-CM

## 2024-10-03 DIAGNOSIS — L97.312 VENOUS STASIS ULCER OF RIGHT ANKLE WITH FAT LAYER EXPOSED WITH VARICOSE VEINS (HCC): ICD-10-CM

## 2024-10-03 DIAGNOSIS — E11.65 TYPE 2 DIABETES MELLITUS WITH HYPERGLYCEMIA, WITHOUT LONG-TERM CURRENT USE OF INSULIN (HCC): ICD-10-CM

## 2024-10-03 DIAGNOSIS — I89.0 LYMPHEDEMA OF BOTH LOWER EXTREMITIES: ICD-10-CM

## 2024-10-03 DIAGNOSIS — I83.013 VENOUS STASIS ULCER OF RIGHT ANKLE WITH FAT LAYER EXPOSED WITH VARICOSE VEINS (HCC): ICD-10-CM

## 2024-10-03 DIAGNOSIS — I87.2 VENOUS STASIS DERMATITIS OF BOTH LOWER EXTREMITIES: ICD-10-CM

## 2024-10-03 PROCEDURE — 99213 OFFICE O/P EST LOW 20 MIN: CPT

## 2024-10-03 PROCEDURE — 29580 STRAPPING UNNA BOOT: CPT

## 2024-10-03 RX ORDER — LIDOCAINE 50 MG/G
OINTMENT TOPICAL ONCE
OUTPATIENT
Start: 2024-10-03 | End: 2024-10-03

## 2024-10-03 RX ORDER — MUPIROCIN 20 MG/G
OINTMENT TOPICAL ONCE
OUTPATIENT
Start: 2024-10-03 | End: 2024-10-03

## 2024-10-03 RX ORDER — ALLOPURINOL 100 MG/1
100 TABLET ORAL 2 TIMES DAILY
COMMUNITY

## 2024-10-03 RX ORDER — LIDOCAINE HYDROCHLORIDE 20 MG/ML
JELLY TOPICAL ONCE
Status: DISCONTINUED | OUTPATIENT
Start: 2024-10-03 | End: 2024-10-04 | Stop reason: HOSPADM

## 2024-10-03 RX ORDER — LIDOCAINE HYDROCHLORIDE 40 MG/ML
SOLUTION TOPICAL ONCE
OUTPATIENT
Start: 2024-10-03 | End: 2024-10-03

## 2024-10-03 RX ORDER — CLOBETASOL PROPIONATE 0.5 MG/G
OINTMENT TOPICAL ONCE
OUTPATIENT
Start: 2024-10-03 | End: 2024-10-03

## 2024-10-03 RX ORDER — LIDOCAINE 40 MG/G
CREAM TOPICAL ONCE
OUTPATIENT
Start: 2024-10-03 | End: 2024-10-03

## 2024-10-03 RX ORDER — BETAMETHASONE DIPROPIONATE 0.5 MG/G
CREAM TOPICAL ONCE
OUTPATIENT
Start: 2024-10-03 | End: 2024-10-03

## 2024-10-03 RX ORDER — LIDOCAINE HYDROCHLORIDE 20 MG/ML
JELLY TOPICAL ONCE
OUTPATIENT
Start: 2024-10-03 | End: 2024-10-03

## 2024-10-03 RX ORDER — NEOMYCIN/BACITRACIN/POLYMYXINB 3.5-400-5K
OINTMENT (GRAM) TOPICAL ONCE
OUTPATIENT
Start: 2024-10-03 | End: 2024-10-03

## 2024-10-03 RX ORDER — BACITRACIN ZINC AND POLYMYXIN B SULFATE 500; 1000 [USP'U]/G; [USP'U]/G
OINTMENT TOPICAL ONCE
OUTPATIENT
Start: 2024-10-03 | End: 2024-10-03

## 2024-10-03 RX ORDER — TRIAMCINOLONE ACETONIDE 1 MG/G
OINTMENT TOPICAL ONCE
OUTPATIENT
Start: 2024-10-03 | End: 2024-10-03

## 2024-10-03 RX ORDER — GENTAMICIN SULFATE 1 MG/G
OINTMENT TOPICAL ONCE
OUTPATIENT
Start: 2024-10-03 | End: 2024-10-03

## 2024-10-03 RX ORDER — GINSENG 100 MG
CAPSULE ORAL ONCE
OUTPATIENT
Start: 2024-10-03 | End: 2024-10-03

## 2024-10-03 RX ORDER — SODIUM CHLOR/HYPOCHLOROUS ACID 0.033 %
SOLUTION, IRRIGATION IRRIGATION ONCE
OUTPATIENT
Start: 2024-10-03 | End: 2024-10-03

## 2024-10-03 NOTE — PROGRESS NOTES
Unna Boot Application   Below Knee    NAME:  Theresa Crow  YOB: 1945  MEDICAL RECORD NUMBER:  [id number]  DATE:  10/3/2024    [x] Applied moisturizing agent to dry skin as needed.   [x] Appied primary and secondary dressing as ordered    [x] Applied Unna roll from toes to knee overlapping each time.   [x] Applied ace wrap or coban from toes to below the knee.   [x] Secured with tape and/or metal clips covered with tape.   [x] Instructed patient/caregiver to keep dressing dry and intact. DO NOT REMOVE DRESSING.   [x] Instructed pt/family/caregiver to report excessive draining, loose bandage, wet dressing, severe pain or tingling in toes.  [x] Applied Unna Boot dressing below the knee to Bilateral lower leg(s)       Unna Boot(s) were applied per  Guidelines.     Electronically signed by JHONY HUSTON RN on 10/3/2024 at 3:40 PM     
07/25/2024 11:19 PM    RBC 4.09 07/25/2024 11:19 PM    HGB 12.4 07/25/2024 11:19 PM    HCT 38.0 07/25/2024 11:19 PM     CRP:   Lab Results   Component Value Date/Time    .8 08/23/2023 06:32 PM    CRP 92.4 08/23/2023 09:01 AM     CMP:  Creatinine:   Lab Results   Component Value Date/Time    CREATININE 1.3 07/25/2024 11:19 PM     HgBA1c:    Lab Results   Component Value Date/Time    LABA1C 6.2 09/26/2016 11:09 AM         PHYSICAL EXAM    General Appearance: alert and oriented to person, place and time, well-developed and well-nourished, in no acute distress  Skin: legs quite edematous- but no open ulceration noted. She does have weeping edema noted, especially to LLE. + [place] hump to bilateral feet, +Stemmer's sign bilaterally.   Head: normocephalic and atraumatic  Pulmonary: normal air movement, no respiratory distress  Cardiovascular/Circulation: BLE Lymphedema, no cyanosis, palpable peripheral pulses  Extremities: no cyanosis and no clubbing   Musculoskeletal: no joint swelling, deformity or tenderness  Neurologic: gait, coordination normal and speech normal      Assessment:     Problem List Items Addressed This Visit          Endocrine    Type 2 diabetes mellitus with hyperglycemia (HCC)    Relevant Medications    lidocaine (XYLOCAINE) 2 % uro-jet    Other Relevant Orders    Initiate Outpatient Wound Care Protocol       Other    Class 3 severe obesity due to excess calories with serious comorbidity and body mass index (BMI) of 45.0 to 49.9 in adult - Primary    Relevant Medications    lidocaine (XYLOCAINE) 2 % uro-jet    Other Relevant Orders    Initiate Outpatient Wound Care Protocol    Venous stasis ulcer of right ankle with fat layer exposed (HCC)    Relevant Medications    lidocaine (XYLOCAINE) 2 % uro-jet    Other Relevant Orders    Initiate Outpatient Wound Care Protocol    Venous stasis ulcer of left ankle with fat layer exposed (HCC)    Relevant Medications    lidocaine (XYLOCAINE) 2 % uro-jet

## 2024-10-03 NOTE — DISCHARGE INSTRUCTIONS
[facility] -Phone: [phone] Fax: [phone]   Visit  Discharge Instructions / Physician Orders    DATE: 10/3/24     Home Care:      SUPPLIES ORDERED THRU: NA     Wound Location: bilateral lower legs     Cleanse with: keep dry and intact     Dressing Orders: bilateral lower legs: calamine unna boots     Frequency: keep dry and intact     Additional Orders: Increase protein to diet (meat, cheese, eggs, fish, peanut butter, nuts and beans)  ELEVATE LEGS AS MUCH AS POSSIBLE    Your next appointment with Wound Care Center is in 1 week     (Please note your next appointment above and if you are unable to keep, kindly give a 24 hour notice. Thank you.)  If more than 15 min late we cannot guarantee you will be seen due to clinician schedule  Per Policy, Excessive cancellation will call for dismissal from program.     If you experience any of the following, please call the Wound Care Center during business hours:  [phone]     * Increase in Pain  * Temperature over 101  * Increase in drainage from your wound  * Drainage with a foul odor  * Bleeding  * Increase in swelling  * Need for compression bandage changes due to slippage, breakthrough drainage.     If you need medical attention outside of the business hours of the Wound Care Centers please contact your PCP or go to the an urgent care or emergency department     The information contained in the After Visit Summary has been reviewed with me, the patient and/or responsible adult, by my health care provider(s). I had the opportunity to ask questions regarding this information. I have elected to receive;      []After Visit Summary  [x]Comprehensive Discharge Instruction      Patient signature______________________________________Date:________   Electronically signed by Sarah Petit RN on 10/3/2024 at 2:55 PM   Electronically signed by REMA Carroll CNP on 10/3/2024 at 2:43 PM

## 2024-10-09 NOTE — DISCHARGE INSTRUCTIONS
[facility] -Phone: [phone] Fax: [phone]    Visit  Discharge Instructions / Physician Orders     DATE: 10/10/2024     Home Care:      SUPPLIES ORDERED THRU: NA     Wound Location: bilateral lower legs     Cleanse with: keep dry and intact     Dressing Orders: left lower leg: calamine unna boot            Right lower leg: velcro compression wrap    Frequency: keep dry and intact     Additional Orders: Increase protein to diet (meat, cheese, eggs, fish, peanut butter, nuts and beans)  ELEVATE LEGS AS MUCH AS POSSIBLE     Your next appointment with Wound Care Center is in 1 week     (Please note your next appointment above and if you are unable to keep, kindly give a 24 hour notice. Thank you.)  If more than 15 min late we cannot guarantee you will be seen due to clinician schedule  Per Policy, Excessive cancellation will call for dismissal from program.     If you experience any of the following, please call the Wound Care Center during business hours:  [phone]     * Increase in Pain  * Temperature over 101  * Increase in drainage from your wound  * Drainage with a foul odor  * Bleeding  * Increase in swelling  * Need for compression bandage changes due to slippage, breakthrough drainage.     If you need medical attention outside of the business hours of the Wound Care Centers please contact your PCP or go to the an urgent care or emergency department     The information contained in the After Visit Summary has been reviewed with me, the patient and/or responsible adult, by my health care provider(s). I had the opportunity to ask questions regarding this information. I have elected to receive;      []After Visit Summary  [x]Comprehensive Discharge Instruction        Patient signature______________________________________Date:________   Electronically signed by Sarah Petit RN on 10/10/2024 at 1:42 PM   Electronically signed by REMA Carroll CNP on 10/10/2024 at 2:25 PM

## 2024-10-10 ENCOUNTER — HOSPITAL ENCOUNTER (OUTPATIENT)
Dept: WOUND CARE | Age: 79
Discharge: HOME | End: 2024-10-10
Payer: MEDICARE

## 2024-10-10 VITALS — SYSTOLIC BLOOD PRESSURE: 124 MMHG | TEMPERATURE: 97.4 F | DIASTOLIC BLOOD PRESSURE: 52 MMHG | HEART RATE: 67 BPM

## 2024-10-10 DIAGNOSIS — E66.813 CLASS 3 SEVERE OBESITY DUE TO EXCESS CALORIES WITH SERIOUS COMORBIDITY AND BODY MASS INDEX (BMI) OF 45.0 TO 49.9 IN ADULT: Primary | ICD-10-CM

## 2024-10-10 DIAGNOSIS — L97.312 VENOUS STASIS ULCER OF RIGHT ANKLE WITH FAT LAYER EXPOSED WITH VARICOSE VEINS (HCC): ICD-10-CM

## 2024-10-10 DIAGNOSIS — I83.023 VENOUS STASIS ULCER OF LEFT ANKLE WITH FAT LAYER EXPOSED WITH VARICOSE VEINS (HCC): ICD-10-CM

## 2024-10-10 DIAGNOSIS — I89.0 LYMPHEDEMA OF BOTH LOWER EXTREMITIES: ICD-10-CM

## 2024-10-10 DIAGNOSIS — E11.65 TYPE 2 DIABETES MELLITUS WITH HYPERGLYCEMIA, WITHOUT LONG-TERM CURRENT USE OF INSULIN (HCC): ICD-10-CM

## 2024-10-10 DIAGNOSIS — E66.01 CLASS 3 SEVERE OBESITY DUE TO EXCESS CALORIES WITH SERIOUS COMORBIDITY AND BODY MASS INDEX (BMI) OF 45.0 TO 49.9 IN ADULT: Primary | ICD-10-CM

## 2024-10-10 DIAGNOSIS — I87.2 VENOUS STASIS DERMATITIS OF BOTH LOWER EXTREMITIES: ICD-10-CM

## 2024-10-10 DIAGNOSIS — L97.322 VENOUS STASIS ULCER OF LEFT ANKLE WITH FAT LAYER EXPOSED WITH VARICOSE VEINS (HCC): ICD-10-CM

## 2024-10-10 DIAGNOSIS — I83.013 VENOUS STASIS ULCER OF RIGHT ANKLE WITH FAT LAYER EXPOSED WITH VARICOSE VEINS (HCC): ICD-10-CM

## 2024-10-10 PROCEDURE — 29580 STRAPPING UNNA BOOT: CPT

## 2024-10-10 RX ORDER — SODIUM CHLOR/HYPOCHLOROUS ACID 0.033 %
SOLUTION, IRRIGATION IRRIGATION ONCE
OUTPATIENT
Start: 2024-10-10 | End: 2024-10-10

## 2024-10-10 RX ORDER — LIDOCAINE HYDROCHLORIDE 20 MG/ML
JELLY TOPICAL ONCE
OUTPATIENT
Start: 2024-10-10 | End: 2024-10-10

## 2024-10-10 RX ORDER — BETAMETHASONE DIPROPIONATE 0.5 MG/G
CREAM TOPICAL ONCE
OUTPATIENT
Start: 2024-10-10 | End: 2024-10-10

## 2024-10-10 RX ORDER — LIDOCAINE HYDROCHLORIDE 20 MG/ML
JELLY TOPICAL ONCE
Status: DISCONTINUED | OUTPATIENT
Start: 2024-10-10 | End: 2024-10-11 | Stop reason: HOSPADM

## 2024-10-10 RX ORDER — LIDOCAINE HYDROCHLORIDE 40 MG/ML
SOLUTION TOPICAL ONCE
OUTPATIENT
Start: 2024-10-10 | End: 2024-10-10

## 2024-10-10 RX ORDER — LIDOCAINE 40 MG/G
CREAM TOPICAL ONCE
OUTPATIENT
Start: 2024-10-10 | End: 2024-10-10

## 2024-10-10 RX ORDER — BACITRACIN ZINC AND POLYMYXIN B SULFATE 500; 1000 [USP'U]/G; [USP'U]/G
OINTMENT TOPICAL ONCE
OUTPATIENT
Start: 2024-10-10 | End: 2024-10-10

## 2024-10-10 RX ORDER — GINSENG 100 MG
CAPSULE ORAL ONCE
OUTPATIENT
Start: 2024-10-10 | End: 2024-10-10

## 2024-10-10 RX ORDER — CLOBETASOL PROPIONATE 0.5 MG/G
OINTMENT TOPICAL ONCE
OUTPATIENT
Start: 2024-10-10 | End: 2024-10-10

## 2024-10-10 RX ORDER — NEOMYCIN/BACITRACIN/POLYMYXINB 3.5-400-5K
OINTMENT (GRAM) TOPICAL ONCE
OUTPATIENT
Start: 2024-10-10 | End: 2024-10-10

## 2024-10-10 RX ORDER — LIDOCAINE 50 MG/G
OINTMENT TOPICAL ONCE
OUTPATIENT
Start: 2024-10-10 | End: 2024-10-10

## 2024-10-10 RX ORDER — GENTAMICIN SULFATE 1 MG/G
OINTMENT TOPICAL ONCE
OUTPATIENT
Start: 2024-10-10 | End: 2024-10-10

## 2024-10-10 RX ORDER — MUPIROCIN 20 MG/G
OINTMENT TOPICAL ONCE
OUTPATIENT
Start: 2024-10-10 | End: 2024-10-10

## 2024-10-10 RX ORDER — TRIAMCINOLONE ACETONIDE 1 MG/G
OINTMENT TOPICAL ONCE
OUTPATIENT
Start: 2024-10-10 | End: 2024-10-10

## 2024-10-10 NOTE — PROGRESS NOTES
of complication, not stated as uncontrolled     Borderline, no meds    Under care of team 08/06/2021    PCP: Janette Uribe, last visit 7/2021    Under care of team 08/06/2021    Cardiologist: Janette Melchor, last visit 5/2021    Under care of team 08/06/2021    Urology: Dr. Turner, last visit 7/2021    Under care of team 08/06/2021    Endo: Dr. Pelletier, [facility], last visit 5/2021       PAST SURGICAL HISTORY    Past Surgical History:   Procedure Laterality Date    ACHILLES TENDON SURGERY Bilateral     ANGIOPLASTY      APPENDECTOMY      BACK SURGERY      BREAST BIOPSY      CARPAL TUNNEL RELEASE Bilateral     CERVICAL SPINE SURGERY      CHOLECYSTECTOMY      COLONOSCOPY      CYSTOSCOPY      with stent 2010    CYSTOSCOPY Left 08/17/2021    HOLMIUM, CYSTOSCOPY, URETEROSCOPY, LITHO, STENT PLACEMENT     DIAGNOSTIC CARDIAC CATH LAB PROCEDURE      10 YEARS AGO    ENDOSCOPY, COLON, DIAGNOSTIC      EYE SURGERY Bilateral     Cataracts    FINGER TRIGGER RELEASE Bilateral     HYSTERECTOMY (CERVIX STATUS UNKNOWN)      JOINT REPLACEMENT Bilateral     Knees    KNEE ARTHROSCOPY Bilateral     LITHOTRIPSY      LITHOTRIPSY      PARATHYROIDECTOMY      STOMACH SURGERY      Cyst removal    THYROIDECTOMY      TONSILLECTOMY      URETER SURGERY Left 8/17/2021    HOLMIUM, CYSTOSCOPY, URETEROSCOPY, LITHO, STENT PLACEMENT performed by Nahun Turner MD at [facility] OR    VASCULAR SURGERY      THORACIC OUTLET RELEASE       FAMILY HISTORY    Family History   Problem Relation Age of Onset    Diabetes Mother     Heart Disease Father     Pacemaker Sister     Atrial Fibrillation Child        SOCIAL HISTORY    Social History     Tobacco Use    Smoking status: Never    Smokeless tobacco: Never   Substance Use Topics    Alcohol use: No     Comment: rare    Drug use: No       ALLERGIES    Allergies   Allergen Reactions    Latex Rash    Other Shortness Of Breath    Adhesive Tape Rash    Bactrim

## 2024-10-16 NOTE — DISCHARGE INSTRUCTIONS
[facility] -Phone: [phone] Fax: [phone]    Visit  Discharge Instructions / Physician Orders     DATE: 10/17/2024     Home Care:      SUPPLIES ORDERED THRU: NA     Wound Location: bilateral lower legs     Cleanse with: keep dry and intact     Dressing Orders: left lower leg: calamine unna boot                                  Right lower leg: calamine unna boot           Left thigh: silvercel, gentac     Frequency: keep dry and intact     Additional Orders: Increase protein to diet (meat, cheese, eggs, fish, peanut butter, nuts and beans)  ELEVATE LEGS AS MUCH AS POSSIBLE     Your next appointment with Wound Care Center is in 1 week     (Please note your next appointment above and if you are unable to keep, kindly give a 24 hour notice. Thank you.)  If more than 15 min late we cannot guarantee you will be seen due to clinician schedule  Per Policy, Excessive cancellation will call for dismissal from program.     If you experience any of the following, please call the Wound Care Center during business hours:  [phone]     * Increase in Pain  * Temperature over 101  * Increase in drainage from your wound  * Drainage with a foul odor  * Bleeding  * Increase in swelling  * Need for compression bandage changes due to slippage, breakthrough drainage.     If you need medical attention outside of the business hours of the Wound Care Centers please contact your PCP or go to the an urgent care or emergency department     The information contained in the After Visit Summary has been reviewed with me, the patient and/or responsible adult, by my health care provider(s). I had the opportunity to ask questions regarding this information. I have elected to receive;      []After Visit Summary  [x]Comprehensive Discharge Instruction        Patient signature______________________________________Date:________  Electronically signed by Sarah Petit RN on 10/17/2024 at 2:06 PM  Electronically signed by

## 2024-10-17 ENCOUNTER — HOSPITAL ENCOUNTER (OUTPATIENT)
Dept: WOUND CARE | Age: 79
Discharge: HOME | End: 2024-10-17
Payer: MEDICARE

## 2024-10-17 VITALS
HEIGHT: 65 IN | DIASTOLIC BLOOD PRESSURE: 75 MMHG | HEART RATE: 74 BPM | TEMPERATURE: 96 F | RESPIRATION RATE: 18 BRPM | WEIGHT: 268 LBS | SYSTOLIC BLOOD PRESSURE: 136 MMHG | BODY MASS INDEX: 44.65 KG/M2

## 2024-10-17 DIAGNOSIS — E66.01 CLASS 3 SEVERE OBESITY DUE TO EXCESS CALORIES WITH SERIOUS COMORBIDITY AND BODY MASS INDEX (BMI) OF 45.0 TO 49.9 IN ADULT: Primary | ICD-10-CM

## 2024-10-17 DIAGNOSIS — E11.65 TYPE 2 DIABETES MELLITUS WITH HYPERGLYCEMIA, WITHOUT LONG-TERM CURRENT USE OF INSULIN (HCC): ICD-10-CM

## 2024-10-17 DIAGNOSIS — I83.023 VENOUS STASIS ULCER OF LEFT ANKLE WITH FAT LAYER EXPOSED WITH VARICOSE VEINS (HCC): ICD-10-CM

## 2024-10-17 DIAGNOSIS — E66.813 CLASS 3 SEVERE OBESITY DUE TO EXCESS CALORIES WITH SERIOUS COMORBIDITY AND BODY MASS INDEX (BMI) OF 45.0 TO 49.9 IN ADULT: Primary | ICD-10-CM

## 2024-10-17 DIAGNOSIS — L97.312 VENOUS STASIS ULCER OF RIGHT ANKLE WITH FAT LAYER EXPOSED WITH VARICOSE VEINS (HCC): ICD-10-CM

## 2024-10-17 DIAGNOSIS — I87.2 VENOUS STASIS DERMATITIS OF BOTH LOWER EXTREMITIES: ICD-10-CM

## 2024-10-17 DIAGNOSIS — L97.322 VENOUS STASIS ULCER OF LEFT ANKLE WITH FAT LAYER EXPOSED WITH VARICOSE VEINS (HCC): ICD-10-CM

## 2024-10-17 DIAGNOSIS — I89.0 LYMPHEDEMA OF BOTH LOWER EXTREMITIES: ICD-10-CM

## 2024-10-17 DIAGNOSIS — I83.013 VENOUS STASIS ULCER OF RIGHT ANKLE WITH FAT LAYER EXPOSED WITH VARICOSE VEINS (HCC): ICD-10-CM

## 2024-10-17 PROCEDURE — 29580 STRAPPING UNNA BOOT: CPT

## 2024-10-17 RX ORDER — NEOMYCIN/BACITRACIN/POLYMYXINB 3.5-400-5K
OINTMENT (GRAM) TOPICAL ONCE
OUTPATIENT
Start: 2024-10-17 | End: 2024-10-17

## 2024-10-17 RX ORDER — MUPIROCIN 20 MG/G
OINTMENT TOPICAL ONCE
OUTPATIENT
Start: 2024-10-17 | End: 2024-10-17

## 2024-10-17 RX ORDER — LIDOCAINE 50 MG/G
OINTMENT TOPICAL ONCE
OUTPATIENT
Start: 2024-10-17 | End: 2024-10-17

## 2024-10-17 RX ORDER — BETAMETHASONE DIPROPIONATE 0.5 MG/G
CREAM TOPICAL ONCE
OUTPATIENT
Start: 2024-10-17 | End: 2024-10-17

## 2024-10-17 RX ORDER — LIDOCAINE HYDROCHLORIDE 40 MG/ML
SOLUTION TOPICAL ONCE
OUTPATIENT
Start: 2024-10-17 | End: 2024-10-17

## 2024-10-17 RX ORDER — LIDOCAINE HYDROCHLORIDE 20 MG/ML
JELLY TOPICAL ONCE
OUTPATIENT
Start: 2024-10-17 | End: 2024-10-17

## 2024-10-17 RX ORDER — SODIUM CHLOR/HYPOCHLOROUS ACID 0.033 %
SOLUTION, IRRIGATION IRRIGATION ONCE
OUTPATIENT
Start: 2024-10-17 | End: 2024-10-17

## 2024-10-17 RX ORDER — BACITRACIN ZINC AND POLYMYXIN B SULFATE 500; 1000 [USP'U]/G; [USP'U]/G
OINTMENT TOPICAL ONCE
OUTPATIENT
Start: 2024-10-17 | End: 2024-10-17

## 2024-10-17 RX ORDER — CLOBETASOL PROPIONATE 0.5 MG/G
OINTMENT TOPICAL ONCE
OUTPATIENT
Start: 2024-10-17 | End: 2024-10-17

## 2024-10-17 RX ORDER — TRIAMCINOLONE ACETONIDE 1 MG/G
OINTMENT TOPICAL ONCE
OUTPATIENT
Start: 2024-10-17 | End: 2024-10-17

## 2024-10-17 RX ORDER — GINSENG 100 MG
CAPSULE ORAL ONCE
OUTPATIENT
Start: 2024-10-17 | End: 2024-10-17

## 2024-10-17 RX ORDER — LIDOCAINE 40 MG/G
CREAM TOPICAL ONCE
OUTPATIENT
Start: 2024-10-17 | End: 2024-10-17

## 2024-10-17 RX ORDER — LIDOCAINE HYDROCHLORIDE 20 MG/ML
JELLY TOPICAL ONCE
Status: COMPLETED | OUTPATIENT
Start: 2024-10-17 | End: 2024-10-17

## 2024-10-17 RX ORDER — GENTAMICIN SULFATE 1 MG/G
OINTMENT TOPICAL ONCE
OUTPATIENT
Start: 2024-10-17 | End: 2024-10-17

## 2024-10-17 RX ADMIN — LIDOCAINE HYDROCHLORIDE 6 ML: 20 JELLY TOPICAL at 14:48

## 2024-10-17 ASSESSMENT — PAIN SCALES - GENERAL: PAINLEVEL_OUTOF10: 0

## 2024-10-17 NOTE — PROGRESS NOTES
Unna Boot Application   Below Knee    NAME:  Theresa Crow  YOB: 1945  MEDICAL RECORD NUMBER:  [id number]  DATE:  10/17/2024    [x] Applied moisturizing agent to dry skin as needed.   [x] Appied primary and secondary dressing as ordered    [x] Applied Unna roll from toes to knee overlapping each time.   [x] Applied ace wrap or coban from toes to below the knee.   [x] Secured with tape and/or metal clips covered with tape.   [x] Instructed patient/caregiver to keep dressing dry and intact. DO NOT REMOVE DRESSING.   [x] Instructed pt/family/caregiver to report excessive draining, loose bandage, wet dressing, severe pain or tingling in toes.  [x] Applied Unna Boot dressing below the knee to Bilateral lower leg(s)       Unna Boot(s) were applied per  Guidelines.     Electronically signed by Eren Cortes RN on 10/17/2024 at 3:26 PM    
Protocol    Venous stasis ulcer of left ankle with fat layer exposed (HCC)    Relevant Orders    Initiate Outpatient Wound Care Protocol    Lymphedema of both lower extremities    Relevant Orders    Initiate Outpatient Wound Care Protocol        Incision 06/09/14 Leg Left (Active)   Number of days: 3783       Wound 10/17/24 Pretibial Left;Proximal;Lateral Wound #1 Left  Proximal cluster (Active)   Wound Etiology Venous 10/17/24 1452   Dressing Status New drainage noted;Old drainage noted 10/17/24 1452   Wound Cleansed Soap and water 10/17/24 1452   Wound Length (cm) 4.5 cm 10/17/24 1452   Wound Width (cm) 2.5 cm 10/17/24 1452   Wound Depth (cm) 0.1 cm 10/17/24 1452   Wound Surface Area (cm^2) 11.25 cm^2 10/17/24 1452   Wound Volume (cm^3) 1.125 cm^3 10/17/24 1452   Post-Procedure Length (cm) 4.5 cm 10/17/24 1452   Post-Procedure Width (cm) 2.5 cm 10/17/24 1452   Post-Procedure Depth (cm) 0.1 cm 10/17/24 1452   Post-Procedure Surface Area (cm^2) 11.25 cm^2 10/17/24 1452   Post-Procedure Volume (cm^3) 1.125 cm^3 10/17/24 1452   Wound Assessment Pink/red 10/17/24 1452   Drainage Amount Moderate (25-50%) 10/17/24 1452   Drainage Description Serosanguinous 10/17/24 1452   Odor None 10/17/24 1452   Mckayla-wound Assessment Blanchable erythema 10/17/24 1452   Margins Attached edges;Defined edges 10/17/24 1452   Wound Thickness Description not for Pressure Injury Full thickness 10/17/24 1452   Number of days: 0                     Plan:     -continue with compression wrap to left leg which has some new weeping areas, use patient compression garment to the right leg     -No signs of infection     -Return to clinic 1 week     -Patient instructed to address leg elevation and calf muscle exercise as a wound healing concern    -I have reviewed instructions with the patient, answering all questions to satisfaction    -Patient to call or return to clinic as needed if wound symptoms worsen or fail to improve as

## 2024-10-19 ENCOUNTER — HOSPITAL ENCOUNTER (OUTPATIENT)
Age: 79
Setting detail: SPECIMEN
Discharge: HOME | End: 2024-10-19

## 2024-10-19 LAB
BACTERIA URNS QL MICRO: ABNORMAL
BILIRUB UR QL STRIP: NEGATIVE
CASTS #/AREA URNS LPF: ABNORMAL /LPF (ref 0–2)
CASTS #/AREA URNS LPF: ABNORMAL /LPF (ref 0–2)
CLARITY UR: ABNORMAL
COLOR UR: YELLOW
CRYSTALS URNS MICRO: ABNORMAL /HPF
CRYSTALS URNS MICRO: ABNORMAL /HPF
EPI CELLS #/AREA URNS HPF: ABNORMAL /HPF (ref 0–5)
GLUCOSE UR STRIP-MCNC: NEGATIVE MG/DL
HGB UR QL STRIP.AUTO: ABNORMAL
KETONES UR STRIP-MCNC: NEGATIVE MG/DL
LEUKOCYTE ESTERASE UR QL STRIP: ABNORMAL
NITRITE UR QL STRIP: POSITIVE
PH UR STRIP: 5.5 [PH] (ref 5–8)
PROT UR STRIP-MCNC: ABNORMAL MG/DL
RBC #/AREA URNS HPF: ABNORMAL /HPF (ref 0–2)
SP GR UR STRIP: 1.01 (ref 1–1.03)
UROBILINOGEN UR STRIP-ACNC: NORMAL EU/DL (ref 0–1)
WBC #/AREA URNS HPF: ABNORMAL /HPF (ref 0–5)

## 2024-10-20 ENCOUNTER — HOSPITAL ENCOUNTER (OUTPATIENT)
Age: 79
Setting detail: SPECIMEN
Discharge: HOME | End: 2024-10-20
Payer: MEDICARE

## 2024-10-20 LAB
C DIFF GDH + TOXINS A+B STL QL IA.RAPID: NORMAL
MICROORGANISM SPEC CULT: ABNORMAL
SPECIMEN DESCRIPTION: ABNORMAL
SPECIMEN DESCRIPTION: NORMAL

## 2024-10-20 PROCEDURE — 83630 LACTOFERRIN FECAL (QUAL): CPT

## 2024-10-20 PROCEDURE — 87449 NOS EACH ORGANISM AG IA: CPT

## 2024-10-20 PROCEDURE — 87324 CLOSTRIDIUM AG IA: CPT

## 2024-10-24 ENCOUNTER — HOSPITAL ENCOUNTER (OUTPATIENT)
Dept: WOUND CARE | Age: 79
Discharge: HOME | End: 2024-10-24
Payer: MEDICARE

## 2024-10-24 VITALS
DIASTOLIC BLOOD PRESSURE: 69 MMHG | SYSTOLIC BLOOD PRESSURE: 152 MMHG | HEART RATE: 76 BPM | TEMPERATURE: 98.6 F | RESPIRATION RATE: 16 BRPM

## 2024-10-24 DIAGNOSIS — L97.312 VENOUS STASIS ULCER OF RIGHT ANKLE WITH FAT LAYER EXPOSED WITH VARICOSE VEINS (HCC): ICD-10-CM

## 2024-10-24 DIAGNOSIS — I83.013 VENOUS STASIS ULCER OF RIGHT ANKLE WITH FAT LAYER EXPOSED WITH VARICOSE VEINS (HCC): ICD-10-CM

## 2024-10-24 DIAGNOSIS — I87.2 VENOUS STASIS DERMATITIS OF BOTH LOWER EXTREMITIES: ICD-10-CM

## 2024-10-24 DIAGNOSIS — L97.322 VENOUS STASIS ULCER OF LEFT ANKLE WITH FAT LAYER EXPOSED WITH VARICOSE VEINS (HCC): ICD-10-CM

## 2024-10-24 DIAGNOSIS — I89.0 LYMPHEDEMA OF BOTH LOWER EXTREMITIES: ICD-10-CM

## 2024-10-24 DIAGNOSIS — E11.65 TYPE 2 DIABETES MELLITUS WITH HYPERGLYCEMIA, WITHOUT LONG-TERM CURRENT USE OF INSULIN (HCC): ICD-10-CM

## 2024-10-24 DIAGNOSIS — E66.813 CLASS 3 SEVERE OBESITY DUE TO EXCESS CALORIES WITH SERIOUS COMORBIDITY AND BODY MASS INDEX (BMI) OF 45.0 TO 49.9 IN ADULT: Primary | ICD-10-CM

## 2024-10-24 DIAGNOSIS — I83.023 VENOUS STASIS ULCER OF LEFT ANKLE WITH FAT LAYER EXPOSED WITH VARICOSE VEINS (HCC): ICD-10-CM

## 2024-10-24 DIAGNOSIS — E66.01 CLASS 3 SEVERE OBESITY DUE TO EXCESS CALORIES WITH SERIOUS COMORBIDITY AND BODY MASS INDEX (BMI) OF 45.0 TO 49.9 IN ADULT: Primary | ICD-10-CM

## 2024-10-24 PROCEDURE — 99212 OFFICE O/P EST SF 10 MIN: CPT

## 2024-10-24 PROCEDURE — 99211 OFF/OP EST MAY X REQ PHY/QHP: CPT

## 2024-10-24 RX ORDER — GENTAMICIN SULFATE 1 MG/G
OINTMENT TOPICAL ONCE
OUTPATIENT
Start: 2024-10-24 | End: 2024-10-24

## 2024-10-24 RX ORDER — TRIAMCINOLONE ACETONIDE 1 MG/G
OINTMENT TOPICAL ONCE
OUTPATIENT
Start: 2024-10-24 | End: 2024-10-24

## 2024-10-24 RX ORDER — BETAMETHASONE DIPROPIONATE 0.5 MG/G
CREAM TOPICAL ONCE
OUTPATIENT
Start: 2024-10-24 | End: 2024-10-24

## 2024-10-24 RX ORDER — CEPHALEXIN 500 MG/1
1 CAPSULE ORAL 2 TIMES DAILY
COMMUNITY
Start: 2024-10-23

## 2024-10-24 RX ORDER — LIDOCAINE HYDROCHLORIDE 20 MG/ML
JELLY TOPICAL ONCE
Status: DISCONTINUED | OUTPATIENT
Start: 2024-10-24 | End: 2024-10-25 | Stop reason: HOSPADM

## 2024-10-24 RX ORDER — NEOMYCIN/BACITRACIN/POLYMYXINB 3.5-400-5K
OINTMENT (GRAM) TOPICAL ONCE
OUTPATIENT
Start: 2024-10-24 | End: 2024-10-24

## 2024-10-24 RX ORDER — BACITRACIN ZINC AND POLYMYXIN B SULFATE 500; 1000 [USP'U]/G; [USP'U]/G
OINTMENT TOPICAL ONCE
OUTPATIENT
Start: 2024-10-24 | End: 2024-10-24

## 2024-10-24 RX ORDER — MUPIROCIN 20 MG/G
OINTMENT TOPICAL ONCE
OUTPATIENT
Start: 2024-10-24 | End: 2024-10-24

## 2024-10-24 RX ORDER — GINSENG 100 MG
CAPSULE ORAL ONCE
OUTPATIENT
Start: 2024-10-24 | End: 2024-10-24

## 2024-10-24 RX ORDER — LIDOCAINE HYDROCHLORIDE 20 MG/ML
JELLY TOPICAL ONCE
OUTPATIENT
Start: 2024-10-24 | End: 2024-10-24

## 2024-10-24 RX ORDER — SODIUM CHLOR/HYPOCHLOROUS ACID 0.033 %
SOLUTION, IRRIGATION IRRIGATION ONCE
OUTPATIENT
Start: 2024-10-24 | End: 2024-10-24

## 2024-10-24 RX ORDER — LIDOCAINE HYDROCHLORIDE 40 MG/ML
SOLUTION TOPICAL ONCE
OUTPATIENT
Start: 2024-10-24 | End: 2024-10-24

## 2024-10-24 RX ORDER — LIDOCAINE 50 MG/G
OINTMENT TOPICAL ONCE
OUTPATIENT
Start: 2024-10-24 | End: 2024-10-24

## 2024-10-24 RX ORDER — CLOBETASOL PROPIONATE 0.5 MG/G
OINTMENT TOPICAL ONCE
OUTPATIENT
Start: 2024-10-24 | End: 2024-10-24

## 2024-10-24 RX ORDER — LIDOCAINE 40 MG/G
CREAM TOPICAL ONCE
OUTPATIENT
Start: 2024-10-24 | End: 2024-10-24

## 2024-10-24 NOTE — PROGRESS NOTES
[facility]   Progress Note and Procedure Note      Theresa Crow  MEDICAL RECORD NUMBER:  [id number]  AGE: 79 y.o.   GENDER: female  : 1945  EPISODE DATE:  10/24/2024    Subjective:     Chief Complaint   Patient presents with    Wound Check         HISTORY of PRESENT ILLNESS HPI     Theresa Crow is a 79 y.o. female who presents today for wound/ulcer evaluation.     History of Wound Context: presents today with a 2 to 3 week history of increased edema to her legs. She states that she had become ill with gout and then had some lethargy and was found to have a UTI. During this time, she had stopped using compression due to not being able to get her garments are because her legs had swollen too much. She now has continued to have leg edema and inability to use the compression garments and now has weeping edema. No actual open areas, but there is edema weeping from several different places on her left lower extremity. Sleeps in a reclining chair at night due to pain and difficulty with mobility.     Interval history: wounds closed today. No remaining open areas. Brought compression garments with her today.     Wound/Ulcer Pain Timing/Severity: none    Wound/Ulcer Identification:  Ulcer Type: venous  Contributing Factors: edema and venous stasis          PAST MEDICAL HISTORY        Diagnosis Date    Allergic rhinitis     Arthritis     Generalized    Borderline diabetes     Caffeine use     2 coffee day    COPD (chronic obstructive pulmonary disease) (HCC)     EARLY STAGE    GERD (gastroesophageal reflux disease)     Kidney stones     Mumps     MVP (mitral valve prolapse)     Nausea & vomiting     Palpitations     PONV (postoperative nausea and vomiting)     Ringing in ears     Schamberg's disease     Thoracic outlet syndrome     Type II or unspecified type diabetes mellitus without mention of complication, not stated as uncontrolled     Borderline, no meds    Under care of

## 2024-10-28 LAB
C DIFF GDH + TOXINS A+B STL QL IA.RAPID: NEGATIVE
LACTOFERRIN STL QL: ABNORMAL
PHYSICIAN ID COMMENT: NORMAL
PHYSICIAN: NORMAL
SPECIMEN DESCRIPTION: NORMAL
SPECIMEN SOURCE: NORMAL
ZZ NTE CLEAN UP: ORDERED TEST: NORMAL

## 2024-10-30 ENCOUNTER — APPOINTMENT (OUTPATIENT)
Dept: GENERAL RADIOLOGY | Age: 79
End: 2024-10-30
Payer: MEDICARE

## 2024-10-30 ENCOUNTER — HOSPITAL ENCOUNTER (EMERGENCY)
Age: 79
Discharge: HOME | End: 2024-10-30
Attending: EMERGENCY MEDICINE
Payer: MEDICARE

## 2024-10-30 VITALS
WEIGHT: 235 LBS | BODY MASS INDEX: 39.15 KG/M2 | DIASTOLIC BLOOD PRESSURE: 64 MMHG | TEMPERATURE: 98.8 F | RESPIRATION RATE: 18 BRPM | OXYGEN SATURATION: 98 % | HEART RATE: 69 BPM | HEIGHT: 65 IN | SYSTOLIC BLOOD PRESSURE: 128 MMHG

## 2024-10-30 DIAGNOSIS — M10.9 ACUTE GOUT OF HAND, UNSPECIFIED CAUSE, UNSPECIFIED LATERALITY: Primary | ICD-10-CM

## 2024-10-30 LAB
ANION GAP SERPL CALCULATED.3IONS-SCNC: 13 MMOL/L (ref 9–17)
BASOPHILS # BLD: 0.06 K/UL (ref 0–0.2)
BASOPHILS NFR BLD: 0 % (ref 0–2)
BUN SERPL-MCNC: 19 MG/DL (ref 8–23)
BUN/CREAT SERPL: 21 (ref 9–20)
CALCIUM SERPL-MCNC: 9.6 MG/DL (ref 8.6–10.4)
CHLORIDE SERPL-SCNC: 90 MMOL/L (ref 98–107)
CO2 SERPL-SCNC: 28 MMOL/L (ref 20–31)
CREAT SERPL-MCNC: 0.9 MG/DL (ref 0.5–0.9)
EOSINOPHIL # BLD: 0.11 K/UL (ref 0–0.44)
EOSINOPHILS RELATIVE PERCENT: 1 % (ref 1–4)
ERYTHROCYTE [DISTWIDTH] IN BLOOD BY AUTOMATED COUNT: 15.3 % (ref 11.8–14.4)
GFR, ESTIMATED: 65 ML/MIN/1.73M2
GLUCOSE SERPL-MCNC: 137 MG/DL (ref 70–99)
HCT VFR BLD AUTO: 31.4 % (ref 36.3–47.1)
HGB BLD-MCNC: 10.1 G/DL (ref 11.9–15.1)
IMM GRANULOCYTES # BLD AUTO: 0.12 K/UL (ref 0–0.3)
IMM GRANULOCYTES NFR BLD: 1 %
LYMPHOCYTES NFR BLD: 0.98 K/UL (ref 1.1–3.7)
LYMPHOCYTES RELATIVE PERCENT: 6 % (ref 24–43)
MCH RBC QN AUTO: 29.3 PG (ref 25.2–33.5)
MCHC RBC AUTO-ENTMCNC: 32.2 G/DL (ref 28.4–34.8)
MCV RBC AUTO: 91 FL (ref 82.6–102.9)
MONOCYTES NFR BLD: 1.09 K/UL (ref 0.1–1.2)
MONOCYTES NFR BLD: 7 % (ref 3–12)
NEUTROPHILS NFR BLD: 85 % (ref 36–65)
NEUTS SEG NFR BLD: 13.08 K/UL (ref 1.5–8.1)
NRBC BLD-RTO: 0 PER 100 WBC
PLATELET # BLD AUTO: 611 K/UL (ref 138–453)
PMV BLD AUTO: 10.3 FL (ref 8.1–13.5)
POTASSIUM SERPL-SCNC: 3.6 MMOL/L (ref 3.7–5.3)
RBC # BLD AUTO: 3.45 M/UL (ref 3.95–5.11)
RBC # BLD: ABNORMAL 10*6/UL
SODIUM SERPL-SCNC: 131 MMOL/L (ref 135–144)
URATE SERPL-MCNC: 6.5 MG/DL (ref 2.4–5.7)
WBC OTHER # BLD: 15.4 K/UL (ref 3.5–11.3)

## 2024-10-30 PROCEDURE — 99284 EMERGENCY DEPT VISIT MOD MDM: CPT

## 2024-10-30 PROCEDURE — 85025 COMPLETE CBC W/AUTO DIFF WBC: CPT

## 2024-10-30 PROCEDURE — 80048 BASIC METABOLIC PNL TOTAL CA: CPT

## 2024-10-30 PROCEDURE — 73130 X-RAY EXAM OF HAND: CPT

## 2024-10-30 PROCEDURE — 96375 TX/PRO/DX INJ NEW DRUG ADDON: CPT

## 2024-10-30 PROCEDURE — 6360000002 HC RX W HCPCS: Performed by: PHYSICIAN ASSISTANT

## 2024-10-30 PROCEDURE — 73110 X-RAY EXAM OF WRIST: CPT

## 2024-10-30 PROCEDURE — 84550 ASSAY OF BLOOD/URIC ACID: CPT

## 2024-10-30 PROCEDURE — 96374 THER/PROPH/DIAG INJ IV PUSH: CPT

## 2024-10-30 PROCEDURE — 6370000000 HC RX 637 (ALT 250 FOR IP): Performed by: PHYSICIAN ASSISTANT

## 2024-10-30 RX ORDER — OXYCODONE AND ACETAMINOPHEN 5; 325 MG/1; MG/1
1 TABLET ORAL ONCE
Status: COMPLETED | OUTPATIENT
Start: 2024-10-30 | End: 2024-10-30

## 2024-10-30 RX ORDER — OXYCODONE AND ACETAMINOPHEN 5; 325 MG/1; MG/1
1-2 TABLET ORAL EVERY 6 HOURS PRN
Qty: 15 TABLET | Refills: 0 | Status: SHIPPED | OUTPATIENT
Start: 2024-10-30 | End: 2024-11-04

## 2024-10-30 RX ORDER — DEXAMETHASONE SODIUM PHOSPHATE 10 MG/ML
6 INJECTION, SOLUTION INTRAMUSCULAR; INTRAVENOUS ONCE
Status: COMPLETED | OUTPATIENT
Start: 2024-10-30 | End: 2024-10-30

## 2024-10-30 RX ORDER — KETOROLAC TROMETHAMINE 15 MG/ML
15 INJECTION, SOLUTION INTRAMUSCULAR; INTRAVENOUS ONCE
Status: COMPLETED | OUTPATIENT
Start: 2024-10-30 | End: 2024-10-30

## 2024-10-30 RX ORDER — PREDNISONE 10 MG/1
TABLET ORAL
Qty: 30 TABLET | Refills: 0 | Status: SHIPPED | OUTPATIENT
Start: 2024-10-30

## 2024-10-30 RX ORDER — OXYCODONE AND ACETAMINOPHEN 5; 325 MG/1; MG/1
1-2 TABLET ORAL EVERY 8 HOURS PRN
Qty: 15 TABLET | Refills: 0 | Status: SHIPPED | OUTPATIENT
Start: 2024-10-30 | End: 2024-10-30 | Stop reason: CLARIF

## 2024-10-30 RX ADMIN — DEXAMETHASONE SODIUM PHOSPHATE 6 MG: 10 INJECTION, SOLUTION INTRAMUSCULAR; INTRAVENOUS at 19:10

## 2024-10-30 RX ADMIN — OXYCODONE HYDROCHLORIDE AND ACETAMINOPHEN 1 TABLET: 5; 325 TABLET ORAL at 17:14

## 2024-10-30 RX ADMIN — KETOROLAC TROMETHAMINE 15 MG: 15 INJECTION, SOLUTION INTRAMUSCULAR; INTRAVENOUS at 18:31

## 2024-10-30 RX ADMIN — OXYCODONE HYDROCHLORIDE AND ACETAMINOPHEN 1 TABLET: 5; 325 TABLET ORAL at 19:10

## 2024-10-30 ASSESSMENT — PAIN DESCRIPTION - LOCATION: LOCATION: ARM;HAND

## 2024-10-30 ASSESSMENT — PAIN - FUNCTIONAL ASSESSMENT: PAIN_FUNCTIONAL_ASSESSMENT: 0-10

## 2024-10-30 ASSESSMENT — PAIN DESCRIPTION - FREQUENCY: FREQUENCY: CONTINUOUS

## 2024-10-30 ASSESSMENT — PAIN SCALES - GENERAL
PAINLEVEL_OUTOF10: 10
PAINLEVEL_OUTOF10: 10
PAINLEVEL_OUTOF10: 7
PAINLEVEL_OUTOF10: 9

## 2024-10-30 ASSESSMENT — PAIN DESCRIPTION - ORIENTATION: ORIENTATION: LEFT;RIGHT

## 2024-10-30 ASSESSMENT — PAIN DESCRIPTION - DESCRIPTORS: DESCRIPTORS: SHARP;SHOOTING;THROBBING

## 2024-10-30 NOTE — ED PROVIDER NOTES
motion and neck supple.      Comments: Some swelling and redness to the bilateral MCP and wrist areas.  Strong handgrips bilaterally.  Symptoms consistent with gout.   Skin:     General: Skin is warm.      Findings: No rash.   Neurological:      Mental Status: She is alert and oriented to person, place, and time.   Psychiatric:         Behavior: Behavior normal.                 DIAGNOSTIC RESULTS     EKG: All EKG's are interpreted by the Emergency Department Physician who either signs or Co-signs this chart in the absence of a cardiologist.        RADIOLOGY:   Non-plain film images such as CT, Ultrasound and MRI are read by the radiologist. Plain radiographic images arevisualized and preliminarily interpreted by the emergency physician with the below findings:        Interpretation per the Radiologist below, if available at thetime of this note:          ED BEDSIDE ULTRASOUND:   Performed by ED Physician - none    LABS:  Labs Reviewed   CBC WITH AUTO DIFFERENTIAL - Abnormal; Notable for the following components:       Result Value    WBC 15.4 (*)     RBC 3.45 (*)     Hemoglobin 10.1 (*)     Hematocrit 31.4 (*)     RDW 15.3 (*)     Platelets 611 (*)     Neutrophils % 85 (*)     Lymphocytes % 6 (*)     Immature Granulocytes % 1 (*)     Neutrophils Absolute 13.08 (*)     Lymphocytes Absolute 0.98 (*)     All other components within normal limits   BASIC METABOLIC PANEL - Abnormal; Notable for the following components:    Sodium 131 (*)     Potassium 3.6 (*)     Chloride 90 (*)     Glucose 137 (*)     BUN/Creatinine Ratio 21 (*)     All other components within normal limits   URIC ACID - Abnormal; Notable for the following components:    Uric Acid 6.5 (*)     All other components within normal limits       All other labs were within normal range or not returned as of this dictation.    EMERGENCY DEPARTMENT COURSE and DIFFERENTIAL DIAGNOSIS/MDM:   Vitals:    Vitals:    10/30/24 1607   BP: 128/64   Pulse: 69   Resp: 18  Daily Amount: 6 tablets    PREDNISONE (DELTASONE) 10 MG TABLET    Take 5 tablets daily for 2 days Take 4 tablets daily for 2 days Take 3 tablets daily for 2 days Take 2 tablets daily for 2 days Take 1 tablets daily for 2 days           (Please note that portions of this note were completed with a voice recognition program.  Efforts were made to edit thedictations but occasionally words are mis-transcribed.)    JAMES Ellis Matthew Christopher, PA-C  10/30/24 1927

## 2024-10-30 NOTE — DISCHARGE INSTRUCTIONS
Take meds as prescribed.  Follow up with doctor tomorrow to schedule an appointment to be seen after steroids are down at minimum.  It is ideal if you could be seen for monitoring within a 3-4 days.  Return to ER immediately if symptoms worsen or persist.

## 2024-11-08 ENCOUNTER — HOSPITAL ENCOUNTER (OUTPATIENT)
Age: 79
Setting detail: SPECIMEN
Discharge: HOME | End: 2024-11-08
Payer: MEDICARE

## 2024-11-08 LAB
BILIRUB UR QL STRIP: NEGATIVE
CLARITY UR: CLEAR
COLOR UR: YELLOW
CRYSTALS URNS MICRO: ABNORMAL /HPF
EPI CELLS #/AREA URNS HPF: ABNORMAL /HPF (ref 0–5)
GLUCOSE UR STRIP-MCNC: NEGATIVE MG/DL
HGB UR QL STRIP.AUTO: NEGATIVE
KETONES UR STRIP-MCNC: NEGATIVE MG/DL
LEUKOCYTE ESTERASE UR QL STRIP: ABNORMAL
NITRITE UR QL STRIP: NEGATIVE
PH UR STRIP: 6 [PH] (ref 5–8)
PROT UR STRIP-MCNC: NEGATIVE MG/DL
RBC #/AREA URNS HPF: ABNORMAL /HPF (ref 0–2)
SP GR UR STRIP: 1.01 (ref 1–1.03)
UROBILINOGEN UR STRIP-ACNC: NORMAL EU/DL (ref 0–1)
WBC #/AREA URNS HPF: ABNORMAL /HPF (ref 0–5)

## 2024-11-08 PROCEDURE — 87086 URINE CULTURE/COLONY COUNT: CPT

## 2024-11-08 PROCEDURE — 81001 URINALYSIS AUTO W/SCOPE: CPT

## 2024-11-08 PROCEDURE — 87077 CULTURE AEROBIC IDENTIFY: CPT

## 2024-11-08 PROCEDURE — 87186 SC STD MICRODIL/AGAR DIL: CPT

## 2024-11-10 LAB
MICROORGANISM SPEC CULT: ABNORMAL
MICROORGANISM SPEC CULT: ABNORMAL
SPECIMEN DESCRIPTION: ABNORMAL

## 2024-11-27 ENCOUNTER — HOSPITAL ENCOUNTER (OUTPATIENT)
Age: 79
Setting detail: SPECIMEN
Discharge: HOME | End: 2024-11-27
Payer: MEDICARE

## 2024-11-27 LAB
ALBUMIN SERPL-MCNC: 3.4 G/DL (ref 3.5–5.2)
ALBUMIN/GLOB SERPL: 0.8 {RATIO} (ref 1–2.5)
ALP SERPL-CCNC: 115 U/L (ref 35–104)
ALT SERPL-CCNC: 5 U/L (ref 10–35)
ANION GAP SERPL CALCULATED.3IONS-SCNC: 13 MMOL/L (ref 9–16)
AST SERPL-CCNC: 19 U/L (ref 10–35)
BASOPHILS # BLD: 0.04 K/UL (ref 0–0.2)
BASOPHILS NFR BLD: 1 % (ref 0–2)
BILIRUB SERPL-MCNC: 0.5 MG/DL (ref 0–1.2)
BILIRUB UR QL STRIP: NEGATIVE
BUN SERPL-MCNC: 26 MG/DL (ref 8–23)
CALCIUM SERPL-MCNC: 10.2 MG/DL (ref 8.8–10.2)
CHLORIDE SERPL-SCNC: 94 MMOL/L (ref 98–107)
CLARITY UR: CLEAR
CO2 SERPL-SCNC: 29 MMOL/L (ref 20–31)
COLOR UR: YELLOW
CREAT SERPL-MCNC: 1.2 MG/DL (ref 0.5–0.9)
EOSINOPHIL # BLD: 0.39 K/UL (ref 0–0.44)
EOSINOPHILS RELATIVE PERCENT: 5 % (ref 1–4)
ERYTHROCYTE [DISTWIDTH] IN BLOOD BY AUTOMATED COUNT: 14.5 % (ref 11.8–14.4)
GFR, ESTIMATED: 45 ML/MIN/1.73M2
GLUCOSE SERPL-MCNC: 190 MG/DL (ref 82–115)
GLUCOSE UR STRIP-MCNC: NEGATIVE MG/DL
HCT VFR BLD AUTO: 33.9 % (ref 36.3–47.1)
HGB BLD-MCNC: 10.9 G/DL (ref 11.9–15.1)
HGB UR QL STRIP.AUTO: NEGATIVE
IMM GRANULOCYTES # BLD AUTO: 0.03 K/UL (ref 0–0.3)
IMM GRANULOCYTES NFR BLD: 0 %
KETONES UR STRIP-MCNC: NEGATIVE MG/DL
LEUKOCYTE ESTERASE UR QL STRIP: NEGATIVE
LYMPHOCYTES NFR BLD: 1.25 K/UL (ref 1.1–3.7)
LYMPHOCYTES RELATIVE PERCENT: 15 % (ref 24–43)
MCH RBC QN AUTO: 29.1 PG (ref 25.2–33.5)
MCHC RBC AUTO-ENTMCNC: 32.2 G/DL (ref 28.4–34.8)
MCV RBC AUTO: 90.6 FL (ref 82.6–102.9)
MONOCYTES NFR BLD: 0.65 K/UL (ref 0.1–1.2)
MONOCYTES NFR BLD: 8 % (ref 3–12)
NEUTROPHILS NFR BLD: 71 % (ref 36–65)
NEUTS SEG NFR BLD: 5.79 K/UL (ref 1.5–8.1)
NITRITE UR QL STRIP: NEGATIVE
NRBC BLD-RTO: 0 PER 100 WBC
PH UR STRIP: 6.5 [PH] (ref 5–8)
PLATELET # BLD AUTO: 604 K/UL (ref 138–453)
PMV BLD AUTO: 10.3 FL (ref 8.1–13.5)
POTASSIUM SERPL-SCNC: 4.5 MMOL/L (ref 3.7–5.3)
PROT SERPL-MCNC: 7.5 G/DL (ref 6.6–8.7)
PROT UR STRIP-MCNC: NEGATIVE MG/DL
RBC # BLD AUTO: 3.74 M/UL (ref 3.95–5.11)
RBC # BLD: ABNORMAL 10*6/UL
SODIUM SERPL-SCNC: 137 MMOL/L (ref 136–145)
SP GR UR STRIP: 1.01 (ref 1–1.03)
UROBILINOGEN UR STRIP-ACNC: NORMAL EU/DL (ref 0–1)
WBC OTHER # BLD: 8.2 K/UL (ref 3.5–11.3)

## 2024-11-27 PROCEDURE — 81003 URINALYSIS AUTO W/O SCOPE: CPT

## 2024-11-27 PROCEDURE — 85025 COMPLETE CBC W/AUTO DIFF WBC: CPT

## 2024-11-27 PROCEDURE — 80053 COMPREHEN METABOLIC PANEL: CPT

## 2025-01-02 ENCOUNTER — HOSPITAL ENCOUNTER (OUTPATIENT)
Dept: WOUND CARE | Age: 80
Discharge: HOME | End: 2025-01-02
Payer: MEDICARE

## 2025-01-02 VITALS — TEMPERATURE: 96.6 F | DIASTOLIC BLOOD PRESSURE: 82 MMHG | SYSTOLIC BLOOD PRESSURE: 145 MMHG | HEART RATE: 67 BPM

## 2025-01-02 DIAGNOSIS — I89.0 LYMPHEDEMA OF BOTH LOWER EXTREMITIES: ICD-10-CM

## 2025-01-02 DIAGNOSIS — L97.312 VENOUS STASIS ULCER OF RIGHT ANKLE WITH FAT LAYER EXPOSED WITH VARICOSE VEINS (HCC): ICD-10-CM

## 2025-01-02 DIAGNOSIS — I83.013 VENOUS STASIS ULCER OF RIGHT ANKLE WITH FAT LAYER EXPOSED WITH VARICOSE VEINS (HCC): ICD-10-CM

## 2025-01-02 DIAGNOSIS — E66.01 CLASS 3 SEVERE OBESITY DUE TO EXCESS CALORIES WITH SERIOUS COMORBIDITY AND BODY MASS INDEX (BMI) OF 45.0 TO 49.9 IN ADULT: Primary | ICD-10-CM

## 2025-01-02 DIAGNOSIS — L97.322 VENOUS STASIS ULCER OF LEFT ANKLE WITH FAT LAYER EXPOSED WITH VARICOSE VEINS (HCC): ICD-10-CM

## 2025-01-02 DIAGNOSIS — E66.813 CLASS 3 SEVERE OBESITY DUE TO EXCESS CALORIES WITH SERIOUS COMORBIDITY AND BODY MASS INDEX (BMI) OF 45.0 TO 49.9 IN ADULT: Primary | ICD-10-CM

## 2025-01-02 DIAGNOSIS — E11.65 TYPE 2 DIABETES MELLITUS WITH HYPERGLYCEMIA, WITHOUT LONG-TERM CURRENT USE OF INSULIN (HCC): ICD-10-CM

## 2025-01-02 DIAGNOSIS — I87.2 VENOUS STASIS DERMATITIS OF BOTH LOWER EXTREMITIES: ICD-10-CM

## 2025-01-02 DIAGNOSIS — I83.023 VENOUS STASIS ULCER OF LEFT ANKLE WITH FAT LAYER EXPOSED WITH VARICOSE VEINS (HCC): ICD-10-CM

## 2025-01-02 PROCEDURE — 11045 DBRDMT SUBQ TISS EACH ADDL: CPT

## 2025-01-02 PROCEDURE — 99213 OFFICE O/P EST LOW 20 MIN: CPT

## 2025-01-02 PROCEDURE — 11042 DBRDMT SUBQ TIS 1ST 20SQCM/<: CPT

## 2025-01-02 RX ORDER — NEOMYCIN/BACITRACIN/POLYMYXINB 3.5-400-5K
OINTMENT (GRAM) TOPICAL ONCE
OUTPATIENT
Start: 2025-01-02 | End: 2025-01-02

## 2025-01-02 RX ORDER — LIDOCAINE HYDROCHLORIDE 20 MG/ML
JELLY TOPICAL ONCE
OUTPATIENT
Start: 2025-01-02 | End: 2025-01-02

## 2025-01-02 RX ORDER — LIDOCAINE 50 MG/G
OINTMENT TOPICAL ONCE
OUTPATIENT
Start: 2025-01-02 | End: 2025-01-02

## 2025-01-02 RX ORDER — SODIUM CHLOR/HYPOCHLOROUS ACID 0.033 %
SOLUTION, IRRIGATION IRRIGATION ONCE
OUTPATIENT
Start: 2025-01-02 | End: 2025-01-02

## 2025-01-02 RX ORDER — TRIAMCINOLONE ACETONIDE 1 MG/G
OINTMENT TOPICAL ONCE
OUTPATIENT
Start: 2025-01-02 | End: 2025-01-02

## 2025-01-02 RX ORDER — MUPIROCIN 20 MG/G
OINTMENT TOPICAL ONCE
OUTPATIENT
Start: 2025-01-02 | End: 2025-01-02

## 2025-01-02 RX ORDER — GINSENG 100 MG
CAPSULE ORAL ONCE
OUTPATIENT
Start: 2025-01-02 | End: 2025-01-02

## 2025-01-02 RX ORDER — LIDOCAINE HYDROCHLORIDE 40 MG/ML
SOLUTION TOPICAL ONCE
OUTPATIENT
Start: 2025-01-02 | End: 2025-01-02

## 2025-01-02 RX ORDER — CLOBETASOL PROPIONATE 0.5 MG/G
OINTMENT TOPICAL ONCE
OUTPATIENT
Start: 2025-01-02 | End: 2025-01-02

## 2025-01-02 RX ORDER — BACITRACIN ZINC AND POLYMYXIN B SULFATE 500; 1000 [USP'U]/G; [USP'U]/G
OINTMENT TOPICAL ONCE
OUTPATIENT
Start: 2025-01-02 | End: 2025-01-02

## 2025-01-02 RX ORDER — LIDOCAINE HYDROCHLORIDE 20 MG/ML
JELLY TOPICAL ONCE
Status: DISCONTINUED | OUTPATIENT
Start: 2025-01-02 | End: 2025-01-03 | Stop reason: HOSPADM

## 2025-01-02 RX ORDER — LIDOCAINE 40 MG/G
CREAM TOPICAL ONCE
OUTPATIENT
Start: 2025-01-02 | End: 2025-01-02

## 2025-01-02 RX ORDER — GENTAMICIN SULFATE 1 MG/G
OINTMENT TOPICAL ONCE
OUTPATIENT
Start: 2025-01-02 | End: 2025-01-02

## 2025-01-02 RX ORDER — BETAMETHASONE DIPROPIONATE 0.5 MG/G
CREAM TOPICAL ONCE
OUTPATIENT
Start: 2025-01-02 | End: 2025-01-02

## 2025-01-02 NOTE — DISCHARGE INSTRUCTIONS
[facility] -Phone: [phone] Fax: [phone]    Visit  Discharge Instructions / Physician Orders     DATE: 1/2/2025     Home Care: [facility]     SUPPLIES ORDERED THRU: Home Care     Wound Location: bilateral lower legs     Cleanse with: Keep Dry and Intact     Dressing Orders: Silvercel, Super absorber (KerraMax, DryMax), Calamine Layer, 3 Layer Compression Wrap                                    Frequency: Home Care to Change on Monday, Wound Care Center will change on Thursdays     Additional Orders: Increase protein to diet (meat, cheese, eggs, fish, peanut butter, nuts and beans)  ELEVATE LEGS AS MUCH AS POSSIBLE     Your next appointment with Wound Care Center is in 1 week with Larissa NP     (Please note your next appointment above and if you are unable to keep, kindly give a 24 hour notice. Thank you.)  If more than 15 min late we cannot guarantee you will be seen due to clinician schedule  Per Policy, Excessive cancellation will call for dismissal from program.     If you experience any of the following, please call the Wound Care Center during business hours:  [phone]     * Increase in Pain  * Temperature over 101  * Increase in drainage from your wound  * Drainage with a foul odor  * Bleeding  * Increase in swelling  * Need for compression bandage changes due to slippage, breakthrough drainage.     If you need medical attention outside of the business hours of the Wound Care Centers please contact your PCP or go to the an urgent care or emergency department     The information contained in the After Visit Summary has been reviewed with me, the patient and/or responsible adult, by my health care provider(s). I had the opportunity to ask questions regarding this information. I have elected to receive;      []After Visit Summary  [x]Comprehensive Discharge Instruction        Patient signature______________________________________Date:________  Electronically signed by Kevin DIGGS

## 2025-01-02 NOTE — PROGRESS NOTES
Multilayer Compression Wrap   (Not Unna) Below the Knee    NAME:  Theresa Crow  YOB: 1945  MEDICAL RECORD NUMBER:  [id number]  DATE:  1/2/2025    Multilayer compression wrap: Removed old Multilayer wrap if indicated and wash leg with mild soap/water.  Applied moisturizing agent to dry skin as needed.   Applied primary and secondary dressing as ordered.  Applied multilayered dressing below the knee to right lower leg.  Applied multilayered dressing below the knee to left lower leg.  Instructed patient/caregiver not to remove dressing and to keep it clean and dry.   Instructed patient/caregiver on complications to report to provider, such as pain, numbness in toes, heavy drainage, and slippage of dressing.  Instructed patient on purpose of compression dressing and on activity and exercise recommendations.      Electronically signed by Tasha Heath RN on 1/2/2025 at 11:40 AM    
Pink/red 01/02/25 1100   Drainage Amount Large (50-75% saturated) 01/02/25 1100   Drainage Description Serous 01/02/25 1100   Odor None 01/02/25 1100   Mckayla-wound Assessment Blanchable erythema 01/02/25 1100   Margins Defined edges 01/02/25 1100   Wound Thickness Description not for Pressure Injury Full thickness 01/02/25 1100   Number of days: 0       Wound 01/02/25 Pretibial Right #2 Cluster (Active)   Wound Image   01/02/25 1100   Wound Etiology Venous 01/02/25 1100   Dressing Status New drainage noted;Old drainage noted 01/02/25 1100   Wound Cleansed Soap and water 01/02/25 1100   Wound Length (cm) 4.5 cm 01/02/25 1100   Wound Width (cm) 3 cm 01/02/25 1100   Wound Depth (cm) 0.2 cm 01/02/25 1100   Wound Surface Area (cm^2) 13.5 cm^2 01/02/25 1100   Wound Volume (cm^3) 2.7 cm^3 01/02/25 1100   Post-Procedure Length (cm) 4.5 cm 01/02/25 1100   Post-Procedure Width (cm) 3 cm 01/02/25 1100   Post-Procedure Depth (cm) 0.2 cm 01/02/25 1100   Post-Procedure Surface Area (cm^2) 13.5 cm^2 01/02/25 1100   Post-Procedure Volume (cm^3) 2.7 cm^3 01/02/25 1100   Wound Assessment Pink/red 01/02/25 1100   Drainage Amount Moderate (25-50%) 01/02/25 1100   Drainage Description Serosanguinous 01/02/25 1100   Odor None 01/02/25 1100   Mckayla-wound Assessment Blanchable erythema 01/02/25 1100   Margins Defined edges 01/02/25 1100   Wound Thickness Description not for Pressure Injury Full thickness 01/02/25 1100   Number of days: 0          Percent of Wound(s)/Ulcer(s) Debrided: 10%    Total Surface Area Debrided:  27 sq cm     Diabetic/Pressure/Non Pressure Ulcers only:  Ulcer: Non-Pressure ulcer, fat layer exposed      Estimated Blood Loss:  Minimal    Hemostasis Achieved:  by pressure    Procedural Pain:  3  / 10     Post Procedural Pain:  1 / 10     Response to treatment:  Well tolerated by patient.                  Plan:     -Local wound care: Multilayer compression.  Home care change midweek.     -Signs of wound

## 2025-01-02 NOTE — PLAN OF CARE
Problem: ABCDS Injury Assessment  Goal: Absence of physical injury  Outcome: Progressing     Problem: Wound:  Goal: Will show signs of wound healing; wound closure and no evidence of infection  Description: Will show signs of wound healing; wound closure and no evidence of infection  Outcome: Progressing     Problem: Falls - Risk of:  Goal: Will remain free from falls  Description: Will remain free from falls  Outcome: Progressing

## 2025-01-07 NOTE — DISCHARGE INSTRUCTIONS
[facility] -Phone: [phone] Fax: [phone]    Visit  Discharge Instructions / Physician Orders     DATE: 1/9/2025     Home Care: [facility]     SUPPLIES ORDERED THRU: Home Care     Wound Location: bilateral lower legs     Cleanse with: Keep Dry and Intact     Dressing Orders: Right Leg: Silvercel, Super absorber (KerraMax, DryMax), Calamine Layer, 3 Layer Compression Wrap                               Left Leg: Calamine Layer, 3 Layer Compression Wrap                                   Frequency: Keep Dry and Intact     Additional Orders: Increase protein to diet (meat, cheese, eggs, fish, peanut butter, nuts and beans)  ELEVATE LEGS AS MUCH AS POSSIBLE     Your next appointment with Wound Care Center is in 1 week with Larissa FARMER     (Please note your next appointment above and if you are unable to keep, kindly give a 24 hour notice. Thank you.)  If more than 15 min late we cannot guarantee you will be seen due to clinician schedule  Per Policy, Excessive cancellation will call for dismissal from program.     If you experience any of the following, please call the Wound Care Center during business hours:  [phone]     * Increase in Pain  * Temperature over 101  * Increase in drainage from your wound  * Drainage with a foul odor  * Bleeding  * Increase in swelling  * Need for compression bandage changes due to slippage, breakthrough drainage.     If you need medical attention outside of the business hours of the Wound Care Centers please contact your PCP or go to the an urgent care or emergency department     The information contained in the After Visit Summary has been reviewed with me, the patient and/or responsible adult, by my health care provider(s). I had the opportunity to ask questions regarding this information. I have elected to receive;      []After Visit Summary  [x]Comprehensive Discharge Instruction        Patient

## 2025-01-09 ENCOUNTER — HOSPITAL ENCOUNTER (OUTPATIENT)
Dept: WOUND CARE | Age: 80
Discharge: HOME | End: 2025-01-09
Payer: MEDICARE

## 2025-01-09 VITALS
DIASTOLIC BLOOD PRESSURE: 57 MMHG | BODY MASS INDEX: 39.11 KG/M2 | TEMPERATURE: 96.8 F | HEIGHT: 65 IN | RESPIRATION RATE: 18 BRPM | SYSTOLIC BLOOD PRESSURE: 112 MMHG | HEART RATE: 62 BPM

## 2025-01-09 DIAGNOSIS — E66.01 CLASS 3 SEVERE OBESITY DUE TO EXCESS CALORIES WITH SERIOUS COMORBIDITY AND BODY MASS INDEX (BMI) OF 45.0 TO 49.9 IN ADULT: Primary | ICD-10-CM

## 2025-01-09 DIAGNOSIS — E66.813 CLASS 3 SEVERE OBESITY DUE TO EXCESS CALORIES WITH SERIOUS COMORBIDITY AND BODY MASS INDEX (BMI) OF 45.0 TO 49.9 IN ADULT: Primary | ICD-10-CM

## 2025-01-09 DIAGNOSIS — L97.322 VENOUS STASIS ULCER OF LEFT ANKLE WITH FAT LAYER EXPOSED WITH VARICOSE VEINS (HCC): ICD-10-CM

## 2025-01-09 DIAGNOSIS — I89.0 LYMPHEDEMA OF BOTH LOWER EXTREMITIES: ICD-10-CM

## 2025-01-09 DIAGNOSIS — I83.023 VENOUS STASIS ULCER OF LEFT ANKLE WITH FAT LAYER EXPOSED WITH VARICOSE VEINS (HCC): ICD-10-CM

## 2025-01-09 DIAGNOSIS — I87.2 VENOUS STASIS DERMATITIS OF BOTH LOWER EXTREMITIES: ICD-10-CM

## 2025-01-09 DIAGNOSIS — L97.312 VENOUS STASIS ULCER OF RIGHT ANKLE WITH FAT LAYER EXPOSED WITH VARICOSE VEINS (HCC): ICD-10-CM

## 2025-01-09 DIAGNOSIS — I83.013 VENOUS STASIS ULCER OF RIGHT ANKLE WITH FAT LAYER EXPOSED WITH VARICOSE VEINS (HCC): ICD-10-CM

## 2025-01-09 DIAGNOSIS — E11.65 TYPE 2 DIABETES MELLITUS WITH HYPERGLYCEMIA, WITHOUT LONG-TERM CURRENT USE OF INSULIN (HCC): ICD-10-CM

## 2025-01-09 PROCEDURE — 29581 APPL MULTLAYER CMPRN SYS LEG: CPT

## 2025-01-09 PROCEDURE — 99212 OFFICE O/P EST SF 10 MIN: CPT

## 2025-01-09 RX ORDER — LIDOCAINE HYDROCHLORIDE 40 MG/ML
SOLUTION TOPICAL ONCE
OUTPATIENT
Start: 2025-01-09 | End: 2025-01-09

## 2025-01-09 RX ORDER — LIDOCAINE 50 MG/G
OINTMENT TOPICAL ONCE
OUTPATIENT
Start: 2025-01-09 | End: 2025-01-09

## 2025-01-09 RX ORDER — LIDOCAINE 40 MG/G
CREAM TOPICAL ONCE
OUTPATIENT
Start: 2025-01-09 | End: 2025-01-09

## 2025-01-09 RX ORDER — NEOMYCIN/BACITRACIN/POLYMYXINB 3.5-400-5K
OINTMENT (GRAM) TOPICAL ONCE
OUTPATIENT
Start: 2025-01-09 | End: 2025-01-09

## 2025-01-09 RX ORDER — BETAMETHASONE DIPROPIONATE 0.5 MG/G
CREAM TOPICAL ONCE
OUTPATIENT
Start: 2025-01-09 | End: 2025-01-09

## 2025-01-09 RX ORDER — MUPIROCIN 20 MG/G
OINTMENT TOPICAL ONCE
OUTPATIENT
Start: 2025-01-09 | End: 2025-01-09

## 2025-01-09 RX ORDER — TRIAMCINOLONE ACETONIDE 1 MG/G
OINTMENT TOPICAL ONCE
OUTPATIENT
Start: 2025-01-09 | End: 2025-01-09

## 2025-01-09 RX ORDER — BACITRACIN ZINC AND POLYMYXIN B SULFATE 500; 1000 [USP'U]/G; [USP'U]/G
OINTMENT TOPICAL ONCE
OUTPATIENT
Start: 2025-01-09 | End: 2025-01-09

## 2025-01-09 RX ORDER — OXYCODONE AND ACETAMINOPHEN 5; 325 MG/1; MG/1
1 TABLET ORAL DAILY PRN
COMMUNITY

## 2025-01-09 RX ORDER — GINSENG 100 MG
CAPSULE ORAL ONCE
OUTPATIENT
Start: 2025-01-09 | End: 2025-01-09

## 2025-01-09 RX ORDER — SODIUM CHLOR/HYPOCHLOROUS ACID 0.033 %
SOLUTION, IRRIGATION IRRIGATION ONCE
OUTPATIENT
Start: 2025-01-09 | End: 2025-01-09

## 2025-01-09 RX ORDER — LIDOCAINE HYDROCHLORIDE 20 MG/ML
JELLY TOPICAL ONCE
Status: COMPLETED | OUTPATIENT
Start: 2025-01-09 | End: 2025-01-09

## 2025-01-09 RX ORDER — LIDOCAINE HYDROCHLORIDE 20 MG/ML
JELLY TOPICAL ONCE
OUTPATIENT
Start: 2025-01-09 | End: 2025-01-09

## 2025-01-09 RX ORDER — GENTAMICIN SULFATE 1 MG/G
OINTMENT TOPICAL ONCE
OUTPATIENT
Start: 2025-01-09 | End: 2025-01-09

## 2025-01-09 RX ORDER — CLOBETASOL PROPIONATE 0.5 MG/G
OINTMENT TOPICAL ONCE
OUTPATIENT
Start: 2025-01-09 | End: 2025-01-09

## 2025-01-09 RX ADMIN — LIDOCAINE HYDROCHLORIDE 6 ML: 20 JELLY TOPICAL at 10:39

## 2025-01-09 NOTE — PROGRESS NOTES
[facility]   Progress Note and Procedure Note      Theresa Crow  MEDICAL RECORD NUMBER:  [id number]  AGE: 79 y.o.   GENDER: female  : 1945  EPISODE DATE:  2025    Subjective:     Chief Complaint   Patient presents with    Wound Check     Bilateral lower legs         HISTORY of PRESENT ILLNESS HPI     Theresa Crow is a 79 y.o. female who presents today for wound/ulcer evaluation.     History of Wound Context: history of lymphedema and recalcitrant VLU. Just discharged from wound care in late October. She was having a hard time getting compression garments on due to recent gout flare. New blistered and ulcerated areas to BLE today.    Interval history: has not yet called the lymphedema pump company to ask for instructions for the pump use. She agreed to call this week and begin using the pump.     Wound/Ulcer Pain Timing/Severity: mild  Quality of pain: burning, tender  Severity:  2 / 10         Wound/Ulcer Identification:  Ulcer Type: venous  Contributing Factors: edema, venous stasis, and lymphedema          PAST MEDICAL HISTORY        Diagnosis Date    Allergic rhinitis     Arthritis     Generalized    Borderline diabetes     Caffeine use     2 coffee day    COPD (chronic obstructive pulmonary disease) (HCC)     EARLY STAGE    GERD (gastroesophageal reflux disease)     Kidney stones     Lymphedema     Mumps     MVP (mitral valve prolapse)     Nausea & vomiting     Palpitations     PONV (postoperative nausea and vomiting)     Ringing in ears     Schamberg's disease     Thoracic outlet syndrome     Type II or unspecified type diabetes mellitus without mention of complication, not stated as uncontrolled     Borderline, no meds    Under care of team 2021    PCP: Janette Uribe, last visit 2021    Under care of team 2021    Cardiologist: Janette Melchor, last visit 2021    Under care of team 2021    Urology: Dr. Turner,

## 2025-01-09 NOTE — PROGRESS NOTES
Multilayer Compression Wrap   (Not Unna) Below the Knee    NAME:  Theresa Crow  YOB: 1945  MEDICAL RECORD NUMBER:  [id number]  DATE:  1/9/2025    Multilayer compression wrap: Removed old Multilayer wrap if indicated and wash leg with mild soap/water.  Applied moisturizing agent to dry skin as needed.   Applied primary and secondary dressing as ordered.  Applied multilayered dressing below the knee to right lower leg.  Applied multilayered dressing below the knee to left lower leg.  Instructed patient/caregiver not to remove dressing and to keep it clean and dry.   Instructed patient/caregiver on complications to report to provider, such as pain, numbness in toes, heavy drainage, and slippage of dressing.  Instructed patient on purpose of compression dressing and on activity and exercise recommendations.      Electronically signed by Eren Cortes RN on 1/9/2025 at 11:22 AM

## 2025-01-10 ENCOUNTER — HOSPITAL ENCOUNTER (OUTPATIENT)
Age: 80
Setting detail: SPECIMEN
Discharge: HOME | End: 2025-01-10
Payer: MEDICARE

## 2025-01-10 LAB
ANION GAP SERPL CALCULATED.3IONS-SCNC: 15 MMOL/L (ref 9–16)
BASOPHILS # BLD: 0.06 K/UL (ref 0–0.2)
BASOPHILS NFR BLD: 1 % (ref 0–2)
BUN SERPL-MCNC: 24 MG/DL (ref 8–23)
CALCIUM SERPL-MCNC: 9.4 MG/DL (ref 8.8–10.2)
CHLORIDE SERPL-SCNC: 99 MMOL/L (ref 98–107)
CO2 SERPL-SCNC: 27 MMOL/L (ref 20–31)
CREAT SERPL-MCNC: 1 MG/DL (ref 0.5–0.9)
EOSINOPHIL # BLD: 0.46 K/UL (ref 0–0.44)
EOSINOPHILS RELATIVE PERCENT: 7 % (ref 1–4)
ERYTHROCYTE [DISTWIDTH] IN BLOOD BY AUTOMATED COUNT: 15.4 % (ref 11.8–14.4)
GFR, ESTIMATED: 59 ML/MIN/1.73M2
GLUCOSE SERPL-MCNC: 205 MG/DL (ref 82–115)
HCT VFR BLD AUTO: 33.8 % (ref 36.3–47.1)
HGB BLD-MCNC: 10.6 G/DL (ref 11.9–15.1)
IMM GRANULOCYTES # BLD AUTO: 0.02 K/UL (ref 0–0.3)
IMM GRANULOCYTES NFR BLD: 0 %
LYMPHOCYTES NFR BLD: 1.1 K/UL (ref 1.1–3.7)
LYMPHOCYTES RELATIVE PERCENT: 16 % (ref 24–43)
MCH RBC QN AUTO: 28.6 PG (ref 25.2–33.5)
MCHC RBC AUTO-ENTMCNC: 31.4 G/DL (ref 28.4–34.8)
MCV RBC AUTO: 91.1 FL (ref 82.6–102.9)
MONOCYTES NFR BLD: 0.35 K/UL (ref 0.1–1.2)
MONOCYTES NFR BLD: 5 % (ref 3–12)
NEUTROPHILS NFR BLD: 71 % (ref 36–65)
NEUTS SEG NFR BLD: 4.8 K/UL (ref 1.5–8.1)
NRBC BLD-RTO: 0 PER 100 WBC
PLATELET # BLD AUTO: 432 K/UL (ref 138–453)
PMV BLD AUTO: 10.7 FL (ref 8.1–13.5)
POTASSIUM SERPL-SCNC: 3.6 MMOL/L (ref 3.7–5.3)
RBC # BLD AUTO: 3.71 M/UL (ref 3.95–5.11)
RBC # BLD: ABNORMAL 10*6/UL
SODIUM SERPL-SCNC: 142 MMOL/L (ref 136–145)
WBC OTHER # BLD: 6.8 K/UL (ref 3.5–11.3)

## 2025-01-10 PROCEDURE — 85025 COMPLETE CBC W/AUTO DIFF WBC: CPT

## 2025-01-10 PROCEDURE — 80048 BASIC METABOLIC PNL TOTAL CA: CPT

## 2025-01-16 NOTE — DISCHARGE INSTRUCTIONS
[facility] -Phone: [phone] Fax: [phone]    Visit  Discharge Instructions / Physician Orders     DATE: 1/17/2025     Home Care: [facility]- Not needed for wound care at this time     SUPPLIES ORDERED THRU: Home Care     Wound Location: bilateral lower legs     Cleanse with: Keep Dry and Intact     Dressing Orders: Left Leg: Lotion to leg: Silvercel, Super absorber (KerraMax, DryMax), Calamine Layer, 3 Layer Compression Wrap                               Right Leg: Compression Stocking on during the day, off at night while in bed                                   Frequency: Keep Dry and Intact     Additional Orders: Increase protein to diet (meat, cheese, eggs, fish, peanut butter, nuts and beans)  ELEVATE LEGS AS MUCH AS POSSIBLE     Your next appointment with Wound Care Center is in 1 week with Larissa FARMER     (Please note your next appointment above and if you are unable to keep, kindly give a 24 hour notice. Thank you.)  If more than 15 min late we cannot guarantee you will be seen due to clinician schedule  Per Policy, Excessive cancellation will call for dismissal from program.     If you experience any of the following, please call the Wound Care Center during business hours:  [phone]     * Increase in Pain  * Temperature over 101  * Increase in drainage from your wound  * Drainage with a foul odor  * Bleeding  * Increase in swelling  * Need for compression bandage changes due to slippage, breakthrough drainage.     If you need medical attention outside of the business hours of the Wound Care Centers please contact your PCP or go to the an urgent care or emergency department     The information contained in the After Visit Summary has been reviewed with me, the patient and/or responsible adult, by my health care provider(s). I had the opportunity to ask questions regarding this information. I have elected to receive;      []After Visit Summary  [x]Comprehensive Discharge

## 2025-01-17 ENCOUNTER — HOSPITAL ENCOUNTER (OUTPATIENT)
Dept: WOUND CARE | Age: 80
Discharge: HOME | End: 2025-01-17
Payer: MEDICARE

## 2025-01-17 VITALS
DIASTOLIC BLOOD PRESSURE: 69 MMHG | TEMPERATURE: 97.2 F | HEART RATE: 60 BPM | SYSTOLIC BLOOD PRESSURE: 155 MMHG | RESPIRATION RATE: 16 BRPM

## 2025-01-17 DIAGNOSIS — L97.322 VENOUS STASIS ULCER OF LEFT ANKLE WITH FAT LAYER EXPOSED WITH VARICOSE VEINS (HCC): Primary | ICD-10-CM

## 2025-01-17 DIAGNOSIS — E11.65 TYPE 2 DIABETES MELLITUS WITH HYPERGLYCEMIA, WITHOUT LONG-TERM CURRENT USE OF INSULIN (HCC): ICD-10-CM

## 2025-01-17 DIAGNOSIS — I83.023 VENOUS STASIS ULCER OF LEFT ANKLE WITH FAT LAYER EXPOSED WITH VARICOSE VEINS (HCC): Primary | ICD-10-CM

## 2025-01-17 DIAGNOSIS — I83.013 VENOUS STASIS ULCER OF RIGHT ANKLE WITH FAT LAYER EXPOSED WITH VARICOSE VEINS (HCC): ICD-10-CM

## 2025-01-17 DIAGNOSIS — I89.0 LYMPHEDEMA OF BOTH LOWER EXTREMITIES: ICD-10-CM

## 2025-01-17 DIAGNOSIS — L97.312 VENOUS STASIS ULCER OF RIGHT ANKLE WITH FAT LAYER EXPOSED WITH VARICOSE VEINS (HCC): ICD-10-CM

## 2025-01-17 DIAGNOSIS — E66.01 CLASS 3 SEVERE OBESITY DUE TO EXCESS CALORIES WITH SERIOUS COMORBIDITY AND BODY MASS INDEX (BMI) OF 45.0 TO 49.9 IN ADULT: ICD-10-CM

## 2025-01-17 DIAGNOSIS — I87.2 VENOUS STASIS DERMATITIS OF BOTH LOWER EXTREMITIES: ICD-10-CM

## 2025-01-17 DIAGNOSIS — E66.813 CLASS 3 SEVERE OBESITY DUE TO EXCESS CALORIES WITH SERIOUS COMORBIDITY AND BODY MASS INDEX (BMI) OF 45.0 TO 49.9 IN ADULT: ICD-10-CM

## 2025-01-17 PROCEDURE — 29581 APPL MULTLAYER CMPRN SYS LEG: CPT

## 2025-01-17 PROCEDURE — 99213 OFFICE O/P EST LOW 20 MIN: CPT | Performed by: NURSE PRACTITIONER

## 2025-01-17 RX ORDER — GINSENG 100 MG
CAPSULE ORAL ONCE
OUTPATIENT
Start: 2025-01-17 | End: 2025-01-17

## 2025-01-17 RX ORDER — CLOBETASOL PROPIONATE 0.5 MG/G
OINTMENT TOPICAL ONCE
OUTPATIENT
Start: 2025-01-17 | End: 2025-01-17

## 2025-01-17 RX ORDER — LIDOCAINE HYDROCHLORIDE 40 MG/ML
SOLUTION TOPICAL ONCE
OUTPATIENT
Start: 2025-01-17 | End: 2025-01-17

## 2025-01-17 RX ORDER — LIDOCAINE HYDROCHLORIDE 20 MG/ML
JELLY TOPICAL ONCE
Status: COMPLETED | OUTPATIENT
Start: 2025-01-17 | End: 2025-01-17

## 2025-01-17 RX ORDER — GENTAMICIN SULFATE 1 MG/G
OINTMENT TOPICAL ONCE
OUTPATIENT
Start: 2025-01-17 | End: 2025-01-17

## 2025-01-17 RX ORDER — BETAMETHASONE DIPROPIONATE 0.5 MG/G
CREAM TOPICAL ONCE
OUTPATIENT
Start: 2025-01-17 | End: 2025-01-17

## 2025-01-17 RX ORDER — LIDOCAINE 50 MG/G
OINTMENT TOPICAL ONCE
OUTPATIENT
Start: 2025-01-17 | End: 2025-01-17

## 2025-01-17 RX ORDER — LIDOCAINE HYDROCHLORIDE 20 MG/ML
JELLY TOPICAL ONCE
OUTPATIENT
Start: 2025-01-17 | End: 2025-01-17

## 2025-01-17 RX ORDER — LIDOCAINE 40 MG/G
CREAM TOPICAL ONCE
OUTPATIENT
Start: 2025-01-17 | End: 2025-01-17

## 2025-01-17 RX ORDER — NEOMYCIN/BACITRACIN/POLYMYXINB 3.5-400-5K
OINTMENT (GRAM) TOPICAL ONCE
OUTPATIENT
Start: 2025-01-17 | End: 2025-01-17

## 2025-01-17 RX ORDER — BACITRACIN ZINC AND POLYMYXIN B SULFATE 500; 1000 [USP'U]/G; [USP'U]/G
OINTMENT TOPICAL ONCE
OUTPATIENT
Start: 2025-01-17 | End: 2025-01-17

## 2025-01-17 RX ORDER — SODIUM CHLOR/HYPOCHLOROUS ACID 0.033 %
SOLUTION, IRRIGATION IRRIGATION ONCE
OUTPATIENT
Start: 2025-01-17 | End: 2025-01-17

## 2025-01-17 RX ORDER — TRIAMCINOLONE ACETONIDE 1 MG/G
OINTMENT TOPICAL ONCE
OUTPATIENT
Start: 2025-01-17 | End: 2025-01-17

## 2025-01-17 RX ORDER — MUPIROCIN 20 MG/G
OINTMENT TOPICAL ONCE
OUTPATIENT
Start: 2025-01-17 | End: 2025-01-17

## 2025-01-17 RX ADMIN — LIDOCAINE HYDROCHLORIDE 6 ML: 20 JELLY TOPICAL at 10:41

## 2025-01-17 ASSESSMENT — PAIN SCALES - GENERAL: PAINLEVEL_OUTOF10: 5

## 2025-01-17 ASSESSMENT — ENCOUNTER SYMPTOMS
SHORTNESS OF BREATH: 0
NAUSEA: 0
VOMITING: 0
COUGH: 0
DIARRHEA: 0
RHINORRHEA: 0

## 2025-01-17 ASSESSMENT — PAIN DESCRIPTION - DESCRIPTORS: DESCRIPTORS: SORE

## 2025-01-17 ASSESSMENT — PAIN DESCRIPTION - LOCATION: LOCATION: LEG

## 2025-01-17 ASSESSMENT — PAIN DESCRIPTION - ORIENTATION: ORIENTATION: LEFT

## 2025-01-17 NOTE — PLAN OF CARE
Problem: Pain  Goal: Verbalizes/displays adequate comfort level or baseline comfort level  1/17/2025 1111 by Kevin Car RN  Outcome: Progressing  1/17/2025 1111 by Kevin Car RN  Outcome: Progressing     Problem: Wound:  Goal: Will show signs of wound healing; wound closure and no evidence of infection  Description: Will show signs of wound healing; wound closure and no evidence of infection  Outcome: Progressing     Problem: Falls - Risk of:  Goal: Will remain free from falls  Description: Will remain free from falls  Outcome: Progressing

## 2025-01-17 NOTE — PROGRESS NOTES
Multilayer Compression Wrap   (Not Unna) Below the Knee    NAME:  Theresa Crow  YOB: 1945  MEDICAL RECORD NUMBER:  [id number]  DATE:  1/17/2025    Multilayer compression wrap: Removed old Multilayer wrap if indicated and wash leg with mild soap/water.  Applied moisturizing agent to dry skin as needed.   Applied primary and secondary dressing as ordered.  Applied multilayered dressing below the knee to left lower leg.  Instructed patient/caregiver not to remove dressing and to keep it clean and dry.   Instructed patient/caregiver on complications to report to provider, such as pain, numbness in toes, heavy drainage, and slippage of dressing.  Instructed patient on purpose of compression dressing and on activity and exercise recommendations.      Electronically signed by Eren Cortes RN on 1/17/2025 at 11:41 AM  
  Wound 01/02/25 Pretibial Right #2 Cluster (Active)   Wound Image   01/02/25 1100   Wound Etiology Venous 01/09/25 1022   Dressing Status New drainage noted;Old drainage noted 01/09/25 1022   Wound Cleansed Soap and water 01/09/25 1022   Wound Length (cm) 0 cm 01/17/25 1038   Wound Width (cm) 0 cm 01/17/25 1038   Wound Depth (cm) 0 cm 01/17/25 1038   Wound Surface Area (cm^2) 0 cm^2 01/17/25 1038   Change in Wound Size % (l*w) 100 01/17/25 1038   Wound Volume (cm^3) 0 cm^3 01/17/25 1038   Wound Healing % 100 01/17/25 1038   Post-Procedure Length (cm) 0 cm 01/17/25 1038   Post-Procedure Width (cm) 0 cm 01/17/25 1038   Post-Procedure Depth (cm) 0 cm 01/17/25 1038   Post-Procedure Surface Area (cm^2) 0 cm^2 01/17/25 1038   Post-Procedure Volume (cm^3) 0 cm^3 01/17/25 1038   Wound Assessment Pink/red 01/09/25 1022   Drainage Amount Moderate (25-50%) 01/09/25 1022   Drainage Description Serosanguinous 01/09/25 1022   Odor None 01/09/25 1022   Mckayla-wound Assessment Blanchable erythema 01/09/25 1022   Margins Defined edges 01/09/25 1022   Wound Thickness Description not for Pressure Injury Full thickness 01/09/25 1022   Number of days: 15       Wound 01/17/25 Pretibial Left;Proximal;Lateral #3 (Active)   Wound Image   01/17/25 1038   Wound Etiology Venous 01/17/25 1038   Dressing Status Old drainage noted;New drainage noted 01/17/25 1038   Wound Cleansed Soap and water 01/17/25 1038   Wound Length (cm) 1 cm 01/17/25 1038   Wound Width (cm) 1.6 cm 01/17/25 1038   Wound Depth (cm) 0.1 cm 01/17/25 1038   Wound Surface Area (cm^2) 1.6 cm^2 01/17/25 1038   Wound Volume (cm^3) 0.16 cm^3 01/17/25 1038   Post-Procedure Length (cm) 1 cm 01/17/25 1038   Post-Procedure Width (cm) 1.6 cm 01/17/25 1038   Post-Procedure Depth (cm) 0.1 cm 01/17/25 1038   Post-Procedure Surface Area (cm^2) 1.6 cm^2 01/17/25 1038   Post-Procedure Volume (cm^3) 0.16 cm^3 01/17/25 1038   Wound Assessment Pink/red 01/17/25 1038   Drainage Amount Moderate

## 2025-01-23 NOTE — DISCHARGE INSTRUCTIONS
[facility] -Phone: [phone] Fax: [phone]    Visit  Discharge Instructions / Physician Orders     DATE: 1/24/2025     Home Care: [facility]- New Orders     SUPPLIES ORDERED THRU: Home Care     Wound Location: bilateral lower legs     Cleanse with: Keep Dry and Intact     Dressing Orders: Left Leg: Lotion to leg (not in wounds)- Silvercel, Super absorber (KerraMax, DryMax), Calamine Layer, 3 Layer Compression Wrap                               Right Leg: Lotion to leg (not in wounds)- Silvercel, Super absorber (KerraMax, DryMax), Calamine Layer, 3 Layer Compression Wrap                                   Frequency: Home Care to change on Monday or Tuesday     Additional Orders: Increase protein to diet (meat, cheese, eggs, fish, peanut butter, nuts and beans)  ELEVATE LEGS AS MUCH AS POSSIBLE     Your next appointment with Wound Care Center is in 1 week      (Please note your next appointment above and if you are unable to keep, kindly give a 24 hour notice. Thank you.)  If more than 15 min late we cannot guarantee you will be seen due to clinician schedule  Per Policy, Excessive cancellation will call for dismissal from program.     If you experience any of the following, please call the Wound Care Center during business hours:  [phone]     * Increase in Pain  * Temperature over 101  * Increase in drainage from your wound  * Drainage with a foul odor  * Bleeding  * Increase in swelling  * Need for compression bandage changes due to slippage, breakthrough drainage.     If you need medical attention outside of the business hours of the Wound Care Centers please contact your PCP or go to the an urgent care or emergency department     The information contained in the After Visit Summary has been reviewed with me, the patient and/or responsible adult, by my health care provider(s). I had the opportunity to ask questions regarding this information. I have elected to receive;      []After

## 2025-01-24 ENCOUNTER — HOSPITAL ENCOUNTER (OUTPATIENT)
Dept: WOUND CARE | Age: 80
Discharge: HOME | End: 2025-01-24
Payer: MEDICARE

## 2025-01-24 VITALS
RESPIRATION RATE: 19 BRPM | HEART RATE: 58 BPM | HEIGHT: 65 IN | SYSTOLIC BLOOD PRESSURE: 142 MMHG | DIASTOLIC BLOOD PRESSURE: 79 MMHG | BODY MASS INDEX: 39.49 KG/M2 | TEMPERATURE: 95.5 F | WEIGHT: 237 LBS

## 2025-01-24 DIAGNOSIS — L97.312 VENOUS STASIS ULCER OF RIGHT ANKLE WITH FAT LAYER EXPOSED WITH VARICOSE VEINS (HCC): ICD-10-CM

## 2025-01-24 DIAGNOSIS — I83.023 VENOUS STASIS ULCER OF LEFT ANKLE WITH FAT LAYER EXPOSED WITH VARICOSE VEINS (HCC): Primary | ICD-10-CM

## 2025-01-24 DIAGNOSIS — I87.2 VENOUS STASIS DERMATITIS OF BOTH LOWER EXTREMITIES: ICD-10-CM

## 2025-01-24 DIAGNOSIS — E66.01 CLASS 3 SEVERE OBESITY DUE TO EXCESS CALORIES WITH SERIOUS COMORBIDITY AND BODY MASS INDEX (BMI) OF 45.0 TO 49.9 IN ADULT: ICD-10-CM

## 2025-01-24 DIAGNOSIS — E11.65 TYPE 2 DIABETES MELLITUS WITH HYPERGLYCEMIA, WITHOUT LONG-TERM CURRENT USE OF INSULIN (HCC): ICD-10-CM

## 2025-01-24 DIAGNOSIS — E66.813 CLASS 3 SEVERE OBESITY DUE TO EXCESS CALORIES WITH SERIOUS COMORBIDITY AND BODY MASS INDEX (BMI) OF 45.0 TO 49.9 IN ADULT: ICD-10-CM

## 2025-01-24 DIAGNOSIS — I83.013 VENOUS STASIS ULCER OF RIGHT ANKLE WITH FAT LAYER EXPOSED WITH VARICOSE VEINS (HCC): ICD-10-CM

## 2025-01-24 DIAGNOSIS — L97.322 VENOUS STASIS ULCER OF LEFT ANKLE WITH FAT LAYER EXPOSED WITH VARICOSE VEINS (HCC): Primary | ICD-10-CM

## 2025-01-24 DIAGNOSIS — I89.0 LYMPHEDEMA OF BOTH LOWER EXTREMITIES: ICD-10-CM

## 2025-01-24 PROCEDURE — 29581 APPL MULTLAYER CMPRN SYS LEG: CPT

## 2025-01-24 PROCEDURE — 99213 OFFICE O/P EST LOW 20 MIN: CPT | Performed by: NURSE PRACTITIONER

## 2025-01-24 RX ORDER — MUPIROCIN 20 MG/G
OINTMENT TOPICAL ONCE
OUTPATIENT
Start: 2025-01-24 | End: 2025-01-24

## 2025-01-24 RX ORDER — SODIUM CHLOR/HYPOCHLOROUS ACID 0.033 %
SOLUTION, IRRIGATION IRRIGATION ONCE
OUTPATIENT
Start: 2025-01-24 | End: 2025-01-24

## 2025-01-24 RX ORDER — LIDOCAINE HYDROCHLORIDE 20 MG/ML
JELLY TOPICAL ONCE
OUTPATIENT
Start: 2025-01-24 | End: 2025-01-24

## 2025-01-24 RX ORDER — GENTAMICIN SULFATE 1 MG/G
OINTMENT TOPICAL ONCE
OUTPATIENT
Start: 2025-01-24 | End: 2025-01-24

## 2025-01-24 RX ORDER — LIDOCAINE 50 MG/G
OINTMENT TOPICAL ONCE
OUTPATIENT
Start: 2025-01-24 | End: 2025-01-24

## 2025-01-24 RX ORDER — LIDOCAINE HYDROCHLORIDE 40 MG/ML
SOLUTION TOPICAL ONCE
OUTPATIENT
Start: 2025-01-24 | End: 2025-01-24

## 2025-01-24 RX ORDER — LIDOCAINE HYDROCHLORIDE 20 MG/ML
JELLY TOPICAL ONCE
Status: COMPLETED | OUTPATIENT
Start: 2025-01-24 | End: 2025-01-24

## 2025-01-24 RX ORDER — NEOMYCIN/BACITRACIN/POLYMYXINB 3.5-400-5K
OINTMENT (GRAM) TOPICAL ONCE
OUTPATIENT
Start: 2025-01-24 | End: 2025-01-24

## 2025-01-24 RX ORDER — LIDOCAINE 40 MG/G
CREAM TOPICAL ONCE
OUTPATIENT
Start: 2025-01-24 | End: 2025-01-24

## 2025-01-24 RX ORDER — TRIAMCINOLONE ACETONIDE 1 MG/G
OINTMENT TOPICAL ONCE
OUTPATIENT
Start: 2025-01-24 | End: 2025-01-24

## 2025-01-24 RX ORDER — GINSENG 100 MG
CAPSULE ORAL ONCE
OUTPATIENT
Start: 2025-01-24 | End: 2025-01-24

## 2025-01-24 RX ORDER — CLOBETASOL PROPIONATE 0.5 MG/G
OINTMENT TOPICAL ONCE
OUTPATIENT
Start: 2025-01-24 | End: 2025-01-24

## 2025-01-24 RX ORDER — BACITRACIN ZINC AND POLYMYXIN B SULFATE 500; 1000 [USP'U]/G; [USP'U]/G
OINTMENT TOPICAL ONCE
OUTPATIENT
Start: 2025-01-24 | End: 2025-01-24

## 2025-01-24 RX ORDER — BETAMETHASONE DIPROPIONATE 0.5 MG/G
CREAM TOPICAL ONCE
OUTPATIENT
Start: 2025-01-24 | End: 2025-01-24

## 2025-01-24 RX ADMIN — LIDOCAINE HYDROCHLORIDE 6 ML: 20 JELLY TOPICAL at 09:46

## 2025-01-24 ASSESSMENT — ENCOUNTER SYMPTOMS
DIARRHEA: 0
RHINORRHEA: 0
NAUSEA: 0
VOMITING: 0
COUGH: 0
SHORTNESS OF BREATH: 0

## 2025-01-24 ASSESSMENT — PAIN DESCRIPTION - LOCATION: LOCATION: LEG

## 2025-01-24 ASSESSMENT — PAIN - FUNCTIONAL ASSESSMENT: PAIN_FUNCTIONAL_ASSESSMENT: ACTIVITIES ARE NOT PREVENTED

## 2025-01-24 ASSESSMENT — PAIN SCALES - GENERAL: PAINLEVEL_OUTOF10: 6

## 2025-01-24 ASSESSMENT — PAIN DESCRIPTION - ORIENTATION: ORIENTATION: RIGHT

## 2025-01-24 ASSESSMENT — PAIN DESCRIPTION - ONSET: ONSET: ON-GOING

## 2025-01-24 ASSESSMENT — PAIN DESCRIPTION - PAIN TYPE: TYPE: ACUTE PAIN

## 2025-01-24 ASSESSMENT — PAIN DESCRIPTION - DESCRIPTORS: DESCRIPTORS: ACHING

## 2025-01-24 ASSESSMENT — PAIN DESCRIPTION - FREQUENCY: FREQUENCY: INTERMITTENT

## 2025-01-24 NOTE — PROGRESS NOTES
[facility]   Progress Note and Procedure Note      Theresa Crow  MEDICAL RECORD NUMBER:  [id number]  AGE: 79 y.o.   GENDER: female  : 1945  EPISODE DATE:  2025    Subjective:     Chief Complaint   Patient presents with    Wound Check     Bilateral lower legs         HISTORY of PRESENT ILLNESS HPI     Theresa Crow is a 79 y.o. female who presents today for wound/ulcer evaluation.   History of Wound Context: here with daughter to follow up on left lower leg wound. Had juxta lite wrap on right leg, blister started two days after appointment. Has increased edema on right lower leg. She will be getting lymphedema pumps 2025. Will have home care nurse change compression wraps once during this week.   Wound/Ulcer Pain Timing/Severity: none  Quality of pain: N/A  Severity:  0 / 10   Modifying Factors: None  Associated Signs/Symptoms: none    Ulcer Identification:  Ulcer Type: venous  Contributing Factors: edema, venous stasis, lymphedema, decreased mobility, and obesity         PAST MEDICAL HISTORY        Diagnosis Date    Allergic rhinitis     Arthritis     Generalized    Borderline diabetes     Caffeine use     2 coffee day    COPD (chronic obstructive pulmonary disease) (HCC)     EARLY STAGE    GERD (gastroesophageal reflux disease)     Kidney stones     Lymphedema     Mumps     MVP (mitral valve prolapse)     Nausea & vomiting     Palpitations     PONV (postoperative nausea and vomiting)     Ringing in ears     Schamberg's disease     Thoracic outlet syndrome     Type II or unspecified type diabetes mellitus without mention of complication, not stated as uncontrolled     Borderline, no meds    Under care of team 2021    PCP: Janette Uribe, last visit 2021    Under care of team 2021    Cardiologist: Janette Melchor, last visit 2021    Under care of team 2021    Urology: Dr. Turner, last visit 2021    Under care of

## 2025-01-24 NOTE — PROGRESS NOTES
Multilayer Compression Wrap   (Not Unna) Below the Knee    NAME:  Theresa Crow  YOB: 1945  MEDICAL RECORD NUMBER:  [id number]  DATE:  1/24/2025    Multilayer compression wrap: Removed old Multilayer wrap if indicated and wash leg with mild soap/water.  Applied moisturizing agent to dry skin as needed.   Applied primary and secondary dressing as ordered.  Applied multilayered dressing below the knee to right lower leg.  Applied multilayered dressing below the knee to left lower leg.  Instructed patient/caregiver not to remove dressing and to keep it clean and dry.   Instructed patient/caregiver on complications to report to provider, such as pain, numbness in toes, heavy drainage, and slippage of dressing.  Instructed patient on purpose of compression dressing and on activity and exercise recommendations.      Electronically signed by Sarah Petit RN on 1/24/2025 at 10:27 AM

## 2025-01-30 NOTE — DISCHARGE INSTRUCTIONS
[facility] -Phone: [phone] Fax: [phone]    Visit  Discharge Instructions / Physician Orders     DATE: 1/31/2025     Home Care: [facility]- New Orders     SUPPLIES ORDERED THRU: Home Care     Wound Location: bilateral lower legs     Cleanse with: Keep Dry and Intact     Dressing Orders: Left Leg: Lotion to leg (not in wounds)- Silvercel, Super absorber (KerraMax, DryMax), Calamine Layer, 3 Layer Compression Wrap                               Right Leg: Lotion to leg (not in wounds)- Silvercel, Super absorber (KerraMax, DryMax), Calamine Layer, 3 Layer Compression Wrap                                   Frequency: Home Care to change on Monday or Tuesday     Additional Orders: Increase protein to diet (meat, cheese, eggs, fish, peanut butter, nuts and beans)  ELEVATE LEGS AS MUCH AS POSSIBLE  Use Lymphedema Pumps as directed     Your next appointment with Wound Care Center is in 1 week      (Please note your next appointment above and if you are unable to keep, kindly give a 24 hour notice. Thank you.)  If more than 15 min late we cannot guarantee you will be seen due to clinician schedule  Per Policy, Excessive cancellation will call for dismissal from program.     If you experience any of the following, please call the Wound Care Center during business hours:  [phone]     * Increase in Pain  * Temperature over 101  * Increase in drainage from your wound  * Drainage with a foul odor  * Bleeding  * Increase in swelling  * Need for compression bandage changes due to slippage, breakthrough drainage.     If you need medical attention outside of the business hours of the Wound Care Centers please contact your PCP or go to the an urgent care or emergency department     The information contained in the After Visit Summary has been reviewed with me, the patient and/or responsible adult, by my health care provider(s). I had the opportunity to ask questions regarding this information. I have

## 2025-01-31 ENCOUNTER — HOSPITAL ENCOUNTER (OUTPATIENT)
Dept: WOUND CARE | Age: 80
Discharge: HOME | End: 2025-01-31
Payer: MEDICARE

## 2025-01-31 VITALS
DIASTOLIC BLOOD PRESSURE: 53 MMHG | HEIGHT: 65 IN | BODY MASS INDEX: 39.49 KG/M2 | SYSTOLIC BLOOD PRESSURE: 117 MMHG | TEMPERATURE: 96.5 F | RESPIRATION RATE: 18 BRPM | WEIGHT: 237 LBS | HEART RATE: 61 BPM

## 2025-01-31 DIAGNOSIS — L97.322 VENOUS STASIS ULCER OF LEFT ANKLE WITH FAT LAYER EXPOSED WITH VARICOSE VEINS (HCC): Primary | ICD-10-CM

## 2025-01-31 DIAGNOSIS — E66.01 CLASS 3 SEVERE OBESITY DUE TO EXCESS CALORIES WITH SERIOUS COMORBIDITY AND BODY MASS INDEX (BMI) OF 45.0 TO 49.9 IN ADULT: ICD-10-CM

## 2025-01-31 DIAGNOSIS — I83.013 VENOUS STASIS ULCER OF RIGHT ANKLE WITH FAT LAYER EXPOSED WITH VARICOSE VEINS (HCC): ICD-10-CM

## 2025-01-31 DIAGNOSIS — I87.2 VENOUS STASIS DERMATITIS OF BOTH LOWER EXTREMITIES: ICD-10-CM

## 2025-01-31 DIAGNOSIS — L97.312 VENOUS STASIS ULCER OF RIGHT ANKLE WITH FAT LAYER EXPOSED WITH VARICOSE VEINS (HCC): ICD-10-CM

## 2025-01-31 DIAGNOSIS — E66.813 CLASS 3 SEVERE OBESITY DUE TO EXCESS CALORIES WITH SERIOUS COMORBIDITY AND BODY MASS INDEX (BMI) OF 45.0 TO 49.9 IN ADULT: ICD-10-CM

## 2025-01-31 DIAGNOSIS — I83.023 VENOUS STASIS ULCER OF LEFT ANKLE WITH FAT LAYER EXPOSED WITH VARICOSE VEINS (HCC): Primary | ICD-10-CM

## 2025-01-31 DIAGNOSIS — E11.65 TYPE 2 DIABETES MELLITUS WITH HYPERGLYCEMIA, WITHOUT LONG-TERM CURRENT USE OF INSULIN (HCC): ICD-10-CM

## 2025-01-31 DIAGNOSIS — I89.0 LYMPHEDEMA OF BOTH LOWER EXTREMITIES: ICD-10-CM

## 2025-01-31 PROCEDURE — 11042 DBRDMT SUBQ TIS 1ST 20SQCM/<: CPT

## 2025-01-31 RX ORDER — LIDOCAINE 50 MG/G
OINTMENT TOPICAL ONCE
OUTPATIENT
Start: 2025-01-31 | End: 2025-01-31

## 2025-01-31 RX ORDER — CLOBETASOL PROPIONATE 0.5 MG/G
OINTMENT TOPICAL ONCE
OUTPATIENT
Start: 2025-01-31 | End: 2025-01-31

## 2025-01-31 RX ORDER — LIDOCAINE HYDROCHLORIDE 40 MG/ML
SOLUTION TOPICAL ONCE
OUTPATIENT
Start: 2025-01-31 | End: 2025-01-31

## 2025-01-31 RX ORDER — NEOMYCIN/BACITRACIN/POLYMYXINB 3.5-400-5K
OINTMENT (GRAM) TOPICAL ONCE
OUTPATIENT
Start: 2025-01-31 | End: 2025-01-31

## 2025-01-31 RX ORDER — LIDOCAINE HYDROCHLORIDE 20 MG/ML
JELLY TOPICAL ONCE
Status: COMPLETED | OUTPATIENT
Start: 2025-01-31 | End: 2025-01-31

## 2025-01-31 RX ORDER — LIDOCAINE HYDROCHLORIDE 20 MG/ML
JELLY TOPICAL ONCE
OUTPATIENT
Start: 2025-01-31 | End: 2025-01-31

## 2025-01-31 RX ORDER — GINSENG 100 MG
CAPSULE ORAL ONCE
OUTPATIENT
Start: 2025-01-31 | End: 2025-01-31

## 2025-01-31 RX ORDER — BACITRACIN ZINC AND POLYMYXIN B SULFATE 500; 1000 [USP'U]/G; [USP'U]/G
OINTMENT TOPICAL ONCE
OUTPATIENT
Start: 2025-01-31 | End: 2025-01-31

## 2025-01-31 RX ORDER — SODIUM CHLOR/HYPOCHLOROUS ACID 0.033 %
SOLUTION, IRRIGATION IRRIGATION ONCE
OUTPATIENT
Start: 2025-01-31 | End: 2025-01-31

## 2025-01-31 RX ORDER — BETAMETHASONE DIPROPIONATE 0.5 MG/G
CREAM TOPICAL ONCE
OUTPATIENT
Start: 2025-01-31 | End: 2025-01-31

## 2025-01-31 RX ORDER — TRIAMCINOLONE ACETONIDE 1 MG/G
OINTMENT TOPICAL ONCE
OUTPATIENT
Start: 2025-01-31 | End: 2025-01-31

## 2025-01-31 RX ORDER — GENTAMICIN SULFATE 1 MG/G
OINTMENT TOPICAL ONCE
OUTPATIENT
Start: 2025-01-31 | End: 2025-01-31

## 2025-01-31 RX ORDER — LIDOCAINE 40 MG/G
CREAM TOPICAL ONCE
OUTPATIENT
Start: 2025-01-31 | End: 2025-01-31

## 2025-01-31 RX ORDER — MUPIROCIN 20 MG/G
OINTMENT TOPICAL ONCE
OUTPATIENT
Start: 2025-01-31 | End: 2025-01-31

## 2025-01-31 RX ADMIN — LIDOCAINE HYDROCHLORIDE 6 ML: 20 JELLY TOPICAL at 10:39

## 2025-01-31 ASSESSMENT — PAIN SCALES - GENERAL
PAINLEVEL_OUTOF10: 0
PAINLEVEL_OUTOF10: 0

## 2025-01-31 ASSESSMENT — ENCOUNTER SYMPTOMS
VOMITING: 0
COUGH: 0
DIARRHEA: 0
RHINORRHEA: 0
NAUSEA: 0
SHORTNESS OF BREATH: 0

## 2025-01-31 NOTE — PROGRESS NOTES
[facility]   Progress Note and Procedure Note      Theresa Crow  MEDICAL RECORD NUMBER:  [id number]  AGE: 79 y.o.   GENDER: female  : 1945  EPISODE DATE:  2025    Subjective:     Chief Complaint   Patient presents with    Wound Check     Right Leg, Left Leg         HISTORY of PRESENT ILLNESS HPI     Theresa Crow is a 79 y.o. female who presents today for wound/ulcer evaluation.   History of Wound Context: presents in follow up on left lower leg wound. Using lymphedema pumps and nurse changed wraps once this week. Edema did improve along with wounds. Recommend compression wrap and lymphedema pumps. Home care nurse to change compression wrap once.   Wound/Ulcer Pain Timing/Severity: none  Quality of pain: N/A  Severity:  0 / 10   Modifying Factors: None  Associated Signs/Symptoms: none    Ulcer Identification:  Ulcer Type: venous  Contributing Factors: edema, venous stasis, lymphedema, decreased mobility, and obesity         PAST MEDICAL HISTORY        Diagnosis Date    Allergic rhinitis     Arthritis     Generalized    Borderline diabetes     Caffeine use     2 coffee day    COPD (chronic obstructive pulmonary disease) (HCC)     EARLY STAGE    GERD (gastroesophageal reflux disease)     Kidney stones     Lymphedema     Mumps     MVP (mitral valve prolapse)     Nausea & vomiting     Palpitations     PONV (postoperative nausea and vomiting)     Ringing in ears     Schamberg's disease     Thoracic outlet syndrome     Type II or unspecified type diabetes mellitus without mention of complication, not stated as uncontrolled     Borderline, no meds    Under care of team 2021    PCP: Janette Uribe, last visit 2021    Under care of team 2021    Cardiologist: aJnette Melchor, last visit 2021    Under care of team 2021    Urology: Dr. Turner, last visit 2021    Under care of team 2021    Endo: Dr. Pelletier, [facility],

## 2025-01-31 NOTE — PROGRESS NOTES
Multilayer Compression Wrap   (Not Unna) Below the Knee    NAME:  Theresa Crow  YOB: 1945  MEDICAL RECORD NUMBER:  [id number]  DATE:  1/31/2025    Multilayer compression wrap: Removed old Multilayer wrap if indicated and wash leg with mild soap/water.  Applied moisturizing agent to dry skin as needed.   Applied primary and secondary dressing as ordered.  Applied multilayered dressing below the knee to right lower leg.  Applied multilayered dressing below the knee to left lower leg.  Instructed patient/caregiver not to remove dressing and to keep it clean and dry.   Instructed patient/caregiver on complications to report to provider, such as pain, numbness in toes, heavy drainage, and slippage of dressing.  Instructed patient on purpose of compression dressing and on activity and exercise recommendations.      Electronically signed by Eren Cortes RN on 1/31/2025 at 11:51 AM

## 2025-02-04 NOTE — DISCHARGE INSTRUCTIONS
[facility] -Phone: [phone] Fax: [phone]    Visit  Discharge Instructions / Physician Orders     DATE: 2/7/2025     Home Care: [facility]- New Orders     SUPPLIES ORDERED THRU: Home Care     Wound Location: bilateral lower legs     Cleanse with: Soap and Water     Dressing Orders: Right Leg, Left Leg: Silver Alginate (Silvercel), Silicone Border Bandage (Excel SAP), Velcro Compression Wraps, On during the day, off at night                                Frequency: Daily     Additional Orders: Increase protein to diet (meat, cheese, eggs, fish, peanut butter, nuts and beans)  ELEVATE LEGS AS MUCH AS POSSIBLE  Use Lymphedema Pumps as directed     Your next appointment with Wound Care Center is in 1 week      (Please note your next appointment above and if you are unable to keep, kindly give a 24 hour notice. Thank you.)  If more than 15 min late we cannot guarantee you will be seen due to clinician schedule  Per Policy, Excessive cancellation will call for dismissal from program.     If you experience any of the following, please call the Wound Care Center during business hours:  [phone]     * Increase in Pain  * Temperature over 101  * Increase in drainage from your wound  * Drainage with a foul odor  * Bleeding  * Increase in swelling  * Need for compression bandage changes due to slippage, breakthrough drainage.     If you need medical attention outside of the business hours of the Wound Care Centers please contact your PCP or go to the an urgent care or emergency department     The information contained in the After Visit Summary has been reviewed with me, the patient and/or responsible adult, by my health care provider(s). I had the opportunity to ask questions regarding this information. I have elected to receive;      []After Visit Summary  [x]Comprehensive Discharge Instruction        Patient signature______________________________________Date:________  Electronically signed

## 2025-02-07 ENCOUNTER — HOSPITAL ENCOUNTER (OUTPATIENT)
Dept: WOUND CARE | Age: 80
Discharge: HOME | End: 2025-02-07
Payer: MEDICARE

## 2025-02-07 VITALS
BODY MASS INDEX: 39.49 KG/M2 | TEMPERATURE: 96.1 F | HEART RATE: 51 BPM | HEIGHT: 65 IN | WEIGHT: 237 LBS | DIASTOLIC BLOOD PRESSURE: 58 MMHG | SYSTOLIC BLOOD PRESSURE: 139 MMHG | RESPIRATION RATE: 19 BRPM

## 2025-02-07 DIAGNOSIS — L97.322 VENOUS STASIS ULCER OF LEFT ANKLE WITH FAT LAYER EXPOSED WITH VARICOSE VEINS (HCC): ICD-10-CM

## 2025-02-07 DIAGNOSIS — I83.023 VENOUS STASIS ULCER OF LEFT ANKLE WITH FAT LAYER EXPOSED WITH VARICOSE VEINS (HCC): ICD-10-CM

## 2025-02-07 DIAGNOSIS — I87.2 VENOUS STASIS DERMATITIS OF BOTH LOWER EXTREMITIES: ICD-10-CM

## 2025-02-07 DIAGNOSIS — S81.802D LEG WOUND, LEFT, SUBSEQUENT ENCOUNTER: Primary | ICD-10-CM

## 2025-02-07 DIAGNOSIS — E11.65 TYPE 2 DIABETES MELLITUS WITH HYPERGLYCEMIA, WITHOUT LONG-TERM CURRENT USE OF INSULIN (HCC): ICD-10-CM

## 2025-02-07 DIAGNOSIS — E66.813 CLASS 3 SEVERE OBESITY DUE TO EXCESS CALORIES WITH SERIOUS COMORBIDITY AND BODY MASS INDEX (BMI) OF 45.0 TO 49.9 IN ADULT: ICD-10-CM

## 2025-02-07 DIAGNOSIS — L97.312 VENOUS STASIS ULCER OF RIGHT ANKLE WITH FAT LAYER EXPOSED WITH VARICOSE VEINS (HCC): ICD-10-CM

## 2025-02-07 DIAGNOSIS — I83.013 VENOUS STASIS ULCER OF RIGHT ANKLE WITH FAT LAYER EXPOSED WITH VARICOSE VEINS (HCC): ICD-10-CM

## 2025-02-07 DIAGNOSIS — I89.0 LYMPHEDEMA OF BOTH LOWER EXTREMITIES: ICD-10-CM

## 2025-02-07 DIAGNOSIS — E66.01 CLASS 3 SEVERE OBESITY DUE TO EXCESS CALORIES WITH SERIOUS COMORBIDITY AND BODY MASS INDEX (BMI) OF 45.0 TO 49.9 IN ADULT: ICD-10-CM

## 2025-02-07 PROCEDURE — 99213 OFFICE O/P EST LOW 20 MIN: CPT

## 2025-02-07 PROCEDURE — 99213 OFFICE O/P EST LOW 20 MIN: CPT | Performed by: NURSE PRACTITIONER

## 2025-02-07 RX ORDER — LIDOCAINE 50 MG/G
OINTMENT TOPICAL ONCE
Status: CANCELLED | OUTPATIENT
Start: 2025-02-07 | End: 2025-02-07

## 2025-02-07 RX ORDER — BETAMETHASONE DIPROPIONATE 0.5 MG/G
CREAM TOPICAL ONCE
Status: CANCELLED | OUTPATIENT
Start: 2025-02-07 | End: 2025-02-07

## 2025-02-07 RX ORDER — LIDOCAINE 40 MG/G
CREAM TOPICAL ONCE
Status: CANCELLED | OUTPATIENT
Start: 2025-02-07 | End: 2025-02-07

## 2025-02-07 RX ORDER — LIDOCAINE HYDROCHLORIDE 20 MG/ML
JELLY TOPICAL ONCE
Status: CANCELLED | OUTPATIENT
Start: 2025-02-07 | End: 2025-02-07

## 2025-02-07 RX ORDER — GENTAMICIN SULFATE 1 MG/G
OINTMENT TOPICAL ONCE
Status: CANCELLED | OUTPATIENT
Start: 2025-02-07 | End: 2025-02-07

## 2025-02-07 RX ORDER — BACITRACIN ZINC AND POLYMYXIN B SULFATE 500; 1000 [USP'U]/G; [USP'U]/G
OINTMENT TOPICAL ONCE
Status: CANCELLED | OUTPATIENT
Start: 2025-02-07 | End: 2025-02-07

## 2025-02-07 RX ORDER — LIDOCAINE HYDROCHLORIDE 20 MG/ML
JELLY TOPICAL ONCE
Status: COMPLETED | OUTPATIENT
Start: 2025-02-07 | End: 2025-02-07

## 2025-02-07 RX ORDER — SODIUM CHLOR/HYPOCHLOROUS ACID 0.033 %
SOLUTION, IRRIGATION IRRIGATION ONCE
Status: CANCELLED | OUTPATIENT
Start: 2025-02-07 | End: 2025-02-07

## 2025-02-07 RX ORDER — MUPIROCIN 20 MG/G
OINTMENT TOPICAL ONCE
Status: CANCELLED | OUTPATIENT
Start: 2025-02-07 | End: 2025-02-07

## 2025-02-07 RX ORDER — LIDOCAINE HYDROCHLORIDE 20 MG/ML
JELLY TOPICAL ONCE
OUTPATIENT
Start: 2025-02-07 | End: 2025-02-07

## 2025-02-07 RX ORDER — CLOBETASOL PROPIONATE 0.5 MG/G
OINTMENT TOPICAL ONCE
Status: CANCELLED | OUTPATIENT
Start: 2025-02-07 | End: 2025-02-07

## 2025-02-07 RX ORDER — NEOMYCIN/BACITRACIN/POLYMYXINB 3.5-400-5K
OINTMENT (GRAM) TOPICAL ONCE
Status: CANCELLED | OUTPATIENT
Start: 2025-02-07 | End: 2025-02-07

## 2025-02-07 RX ORDER — LIDOCAINE HYDROCHLORIDE 40 MG/ML
SOLUTION TOPICAL ONCE
OUTPATIENT
Start: 2025-02-07 | End: 2025-02-07

## 2025-02-07 RX ORDER — GINSENG 100 MG
CAPSULE ORAL ONCE
Status: CANCELLED | OUTPATIENT
Start: 2025-02-07 | End: 2025-02-07

## 2025-02-07 RX ORDER — TRIAMCINOLONE ACETONIDE 1 MG/G
OINTMENT TOPICAL ONCE
Status: CANCELLED | OUTPATIENT
Start: 2025-02-07 | End: 2025-02-07

## 2025-02-07 RX ADMIN — LIDOCAINE HYDROCHLORIDE 6 ML: 20 JELLY TOPICAL at 09:22

## 2025-02-07 ASSESSMENT — ENCOUNTER SYMPTOMS
NAUSEA: 0
RHINORRHEA: 0
VOMITING: 0
SHORTNESS OF BREATH: 0
DIARRHEA: 0
COUGH: 0

## 2025-02-07 ASSESSMENT — PAIN DESCRIPTION - DESCRIPTORS: DESCRIPTORS: ACHING

## 2025-02-07 ASSESSMENT — PAIN SCALES - GENERAL: PAINLEVEL_OUTOF10: 6

## 2025-02-07 ASSESSMENT — PAIN DESCRIPTION - ONSET: ONSET: ON-GOING

## 2025-02-07 ASSESSMENT — PAIN - FUNCTIONAL ASSESSMENT: PAIN_FUNCTIONAL_ASSESSMENT: ACTIVITIES ARE NOT PREVENTED

## 2025-02-07 ASSESSMENT — PAIN DESCRIPTION - PAIN TYPE: TYPE: ACUTE PAIN

## 2025-02-07 ASSESSMENT — PAIN DESCRIPTION - LOCATION: LOCATION: LEG

## 2025-02-07 ASSESSMENT — PAIN DESCRIPTION - FREQUENCY: FREQUENCY: INTERMITTENT

## 2025-02-07 ASSESSMENT — PAIN DESCRIPTION - ORIENTATION: ORIENTATION: LEFT

## 2025-02-07 NOTE — PROGRESS NOTES
01/31/25 107.5 kg (237 lb)   01/24/25 107.5 kg (237 lb)       PHYSICAL EXAM    General Appearance: alert and oriented to person, place and time, well-developed and obese, in no acute distress  Skin: warm and dry, no rash or erythema, left leg wound   Head: normocephalic and atraumatic  Eyes: pupils equal, round and conjunctivae normal  Pulmonary/Chest: normal air movement, no respiratory distress  Extremities: no cyanosis and no clubbing   Musculoskeletal: no joint swelling, deformity or tenderness  Neurologic: gait, coordination normal and speech normal      Assessment:     Problem List Items Addressed This Visit       Class 3 severe obesity due to excess calories with serious comorbidity and body mass index (BMI) of 45.0 to 49.9 in adult    Relevant Orders    Initiate Outpatient Wound Care Protocol    Leg wound, left, subsequent encounter - Primary    Lymphedema of both lower extremities    Relevant Orders    Initiate Outpatient Wound Care Protocol    Type 2 diabetes mellitus with hyperglycemia (HCC)    Relevant Orders    Initiate Outpatient Wound Care Protocol    Venous stasis dermatitis of both lower extremities    Relevant Orders    Initiate Outpatient Wound Care Protocol    Venous stasis ulcer of left ankle with fat layer exposed (HCC)    Relevant Orders    Initiate Outpatient Wound Care Protocol    Venous stasis ulcer of right ankle with fat layer exposed (HCC)    Relevant Orders    Initiate Outpatient Wound Care Protocol        Procedure Note  Indications:  Based on my examination of this patient's wound(s)/ulcer(s) today, debridement is not required to promote healing and evaluate the wound base.    Wound Measurements:  Wound/Ulcer Descriptions are Pre Debridement except measurements:    Incision 06/09/14 Leg Left (Active)   Number of days: 3895       Wound 01/17/25 Pretibial Left;Proximal;Lateral #3 (Active)   Wound Image   01/24/25 0949   Wound Etiology Venous 02/07/25 0922   Dressing Status Old

## 2025-02-11 NOTE — DISCHARGE INSTRUCTIONS
[facility] -Phone: [phone] Fax: [phone]    Visit  Discharge Instructions / Physician Orders     DATE: 2/12/2025     Home Care: [facility] Orders     SUPPLIES ORDERED THRU: Home Care     Wound Location: bilateral lower legs     Cleanse with: Soap and Water     Dressing Orders: Left thigh: silvercel. Silicone border bandage          Right Leg: Velcro Compression Wraps, On during the day, off at night while in bed         Left Leg:  fibracol, silvercel, calamine layer, 3 layer wrap                                Frequency: Daily to left thigh,  left lower leg to be changed once a week per home health    Additional Orders: Increase protein to diet (meat, cheese, eggs, fish, peanut butter, nuts and beans)  ELEVATE LEGS AS MUCH AS POSSIBLE  Use Lymphedema Pumps as directed     Your next appointment with Wound Care Center is in 1 week      (Please note your next appointment above and if you are unable to keep, kindly give a 24 hour notice. Thank you.)  If more than 15 min late we cannot guarantee you will be seen due to clinician schedule  Per Policy, Excessive cancellation will call for dismissal from program.     If you experience any of the following, please call the Wound Care Center during business hours:  [phone]     * Increase in Pain  * Temperature over 101  * Increase in drainage from your wound  * Drainage with a foul odor  * Bleeding  * Increase in swelling  * Need for compression bandage changes due to slippage, breakthrough drainage.     If you need medical attention outside of the business hours of the Wound Care Centers please contact your PCP or go to the an urgent care or emergency department     The information contained in the After Visit Summary has been reviewed with me, the patient and/or responsible adult, by my health care provider(s). I had the opportunity to ask questions regarding this information. I have elected to receive;      []After Visit

## 2025-02-12 ENCOUNTER — HOSPITAL ENCOUNTER (OUTPATIENT)
Dept: WOUND CARE | Age: 80
Discharge: HOME | End: 2025-02-12
Payer: MEDICARE

## 2025-02-12 VITALS
TEMPERATURE: 97.5 F | WEIGHT: 237 LBS | HEART RATE: 58 BPM | HEIGHT: 65 IN | DIASTOLIC BLOOD PRESSURE: 71 MMHG | BODY MASS INDEX: 39.49 KG/M2 | SYSTOLIC BLOOD PRESSURE: 138 MMHG

## 2025-02-12 DIAGNOSIS — E66.01 CLASS 3 SEVERE OBESITY DUE TO EXCESS CALORIES WITH SERIOUS COMORBIDITY AND BODY MASS INDEX (BMI) OF 45.0 TO 49.9 IN ADULT: ICD-10-CM

## 2025-02-12 DIAGNOSIS — S81.802D LEG WOUND, LEFT, SUBSEQUENT ENCOUNTER: Primary | ICD-10-CM

## 2025-02-12 DIAGNOSIS — I87.2 VENOUS STASIS DERMATITIS OF BOTH LOWER EXTREMITIES: ICD-10-CM

## 2025-02-12 DIAGNOSIS — E66.813 CLASS 3 SEVERE OBESITY DUE TO EXCESS CALORIES WITH SERIOUS COMORBIDITY AND BODY MASS INDEX (BMI) OF 45.0 TO 49.9 IN ADULT: ICD-10-CM

## 2025-02-12 DIAGNOSIS — I89.0 LYMPHEDEMA OF BOTH LOWER EXTREMITIES: ICD-10-CM

## 2025-02-12 DIAGNOSIS — E11.65 TYPE 2 DIABETES MELLITUS WITH HYPERGLYCEMIA, WITHOUT LONG-TERM CURRENT USE OF INSULIN (HCC): ICD-10-CM

## 2025-02-12 PROCEDURE — 11042 DBRDMT SUBQ TIS 1ST 20SQCM/<: CPT

## 2025-02-12 RX ORDER — LIDOCAINE HYDROCHLORIDE 20 MG/ML
JELLY TOPICAL ONCE
OUTPATIENT
Start: 2025-02-12 | End: 2025-02-12

## 2025-02-12 RX ORDER — LIDOCAINE HYDROCHLORIDE 40 MG/ML
SOLUTION TOPICAL ONCE
OUTPATIENT
Start: 2025-02-12 | End: 2025-02-12

## 2025-02-12 RX ORDER — LIDOCAINE HYDROCHLORIDE 20 MG/ML
JELLY TOPICAL ONCE
Status: COMPLETED | OUTPATIENT
Start: 2025-02-12 | End: 2025-02-12

## 2025-02-12 RX ADMIN — LIDOCAINE HYDROCHLORIDE 6 ML: 20 JELLY TOPICAL at 14:19

## 2025-02-12 ASSESSMENT — PAIN SCALES - GENERAL: PAINLEVEL_OUTOF10: 0

## 2025-02-12 NOTE — PROGRESS NOTES
08/06/2021    Endo: Dr. Pelletier, [facility], last visit 5/2021       PAST SURGICAL HISTORY    Past Surgical History:   Procedure Laterality Date    ACHILLES TENDON SURGERY Bilateral     ANGIOPLASTY      APPENDECTOMY      BACK SURGERY      BREAST BIOPSY      CARPAL TUNNEL RELEASE Bilateral     CERVICAL SPINE SURGERY      CHOLECYSTECTOMY      COLONOSCOPY      CYSTOSCOPY      with stent 2010    CYSTOSCOPY Left 08/17/2021    HOLMIUM, CYSTOSCOPY, URETEROSCOPY, LITHO, STENT PLACEMENT     DIAGNOSTIC CARDIAC CATH LAB PROCEDURE      10 YEARS AGO    ENDOSCOPY, COLON, DIAGNOSTIC      EYE SURGERY Bilateral     Cataracts    FINGER TRIGGER RELEASE Bilateral     HYSTERECTOMY (CERVIX STATUS UNKNOWN)      JOINT REPLACEMENT Bilateral     Knees    KNEE ARTHROSCOPY Bilateral     LITHOTRIPSY      LITHOTRIPSY      PARATHYROIDECTOMY      STOMACH SURGERY      Cyst removal    THYROIDECTOMY      TONSILLECTOMY      URETER SURGERY Left 8/17/2021    HOLMIUM, CYSTOSCOPY, URETEROSCOPY, LITHO, STENT PLACEMENT performed by Nahun Turner MD at [facility] OR    VASCULAR SURGERY      THORACIC OUTLET RELEASE       FAMILY HISTORY    Family History   Problem Relation Age of Onset    Diabetes Mother     Heart Disease Father     Pacemaker Sister     Atrial Fibrillation Child        SOCIAL HISTORY    Social History     Tobacco Use    Smoking status: Never    Smokeless tobacco: Never   Vaping Use    Vaping status: Never Used   Substance Use Topics    Alcohol use: No     Comment: rare    Drug use: No       ALLERGIES    Allergies   Allergen Reactions    Latex Rash    Other Shortness Of Breath    Adhesive Tape Rash    Bactrim [Sulfamethoxazole-Trimethoprim]     Codeine Other (See Comments)     UPSET STOMACH    Iodine Hives    Pcn [Penicillins] Other (See Comments)     Unknown-child, pt just had Keflex from dentist and was fine    Morphine Rash       MEDICATIONS    Current Outpatient Medications on File Prior to Encounter   Medication Sig Dispense Refill

## 2025-02-18 NOTE — DISCHARGE INSTRUCTIONS
[facility] -Phone: [phone] Fax: [phone]    Visit  Discharge Instructions / Physician Orders     DATE: 2/19/2025     Home Care: [facility]     SUPPLIES ORDERED THRU: Home Care     Wound Location: left lower leg     Cleanse with: keep dry and intact     Dressing Orders:           Right Leg: Velcro Compression Wraps, On during the day, off at night while in bed         Left Leg: fibracol, silvercel, calamine layer, 3 layer wrap                              .  Frequency:  left lower leg keep dry and intact     Additional Orders: Increase protein to diet (meat, cheese, eggs, fish, peanut butter, nuts and beans)  ELEVATE LEGS AS MUCH AS POSSIBLE  Use Lymphedema Pumps as directed     Your next appointment with Wound Care Center is in 1 week      (Please note your next appointment above and if you are unable to keep, kindly give a 24 hour notice. Thank you.)  If more than 15 min late we cannot guarantee you will be seen due to clinician schedule  Per Policy, Excessive cancellation will call for dismissal from program.     If you experience any of the following, please call the Wound Care Center during business hours:  [phone]     * Increase in Pain  * Temperature over 101  * Increase in drainage from your wound  * Drainage with a foul odor  * Bleeding  * Increase in swelling  * Need for compression bandage changes due to slippage, breakthrough drainage.     If you need medical attention outside of the business hours of the Wound Care Centers please contact your PCP or go to the an urgent care or emergency department     The information contained in the After Visit Summary has been reviewed with me, the patient and/or responsible adult, by my health care provider(s). I had the opportunity to ask questions regarding this information. I have elected to receive;      []After Visit Summary  [x]Comprehensive Discharge Instruction        Patient

## 2025-02-19 ENCOUNTER — HOSPITAL ENCOUNTER (OUTPATIENT)
Dept: WOUND CARE | Age: 80
Discharge: HOME | End: 2025-02-19
Payer: MEDICARE

## 2025-02-19 VITALS
SYSTOLIC BLOOD PRESSURE: 149 MMHG | DIASTOLIC BLOOD PRESSURE: 58 MMHG | TEMPERATURE: 97.2 F | HEART RATE: 52 BPM | RESPIRATION RATE: 16 BRPM

## 2025-02-19 DIAGNOSIS — I87.2 VENOUS STASIS DERMATITIS OF BOTH LOWER EXTREMITIES: ICD-10-CM

## 2025-02-19 DIAGNOSIS — E66.01 CLASS 3 SEVERE OBESITY DUE TO EXCESS CALORIES WITH SERIOUS COMORBIDITY AND BODY MASS INDEX (BMI) OF 45.0 TO 49.9 IN ADULT: ICD-10-CM

## 2025-02-19 DIAGNOSIS — S81.802D LEG WOUND, LEFT, SUBSEQUENT ENCOUNTER: Primary | ICD-10-CM

## 2025-02-19 DIAGNOSIS — E66.813 CLASS 3 SEVERE OBESITY DUE TO EXCESS CALORIES WITH SERIOUS COMORBIDITY AND BODY MASS INDEX (BMI) OF 45.0 TO 49.9 IN ADULT: ICD-10-CM

## 2025-02-19 DIAGNOSIS — E11.65 TYPE 2 DIABETES MELLITUS WITH HYPERGLYCEMIA, WITHOUT LONG-TERM CURRENT USE OF INSULIN (HCC): ICD-10-CM

## 2025-02-19 DIAGNOSIS — I89.0 LYMPHEDEMA OF BOTH LOWER EXTREMITIES: ICD-10-CM

## 2025-02-19 PROCEDURE — 11042 DBRDMT SUBQ TIS 1ST 20SQCM/<: CPT

## 2025-02-19 RX ORDER — LIDOCAINE HYDROCHLORIDE 20 MG/ML
JELLY TOPICAL ONCE
Status: COMPLETED | OUTPATIENT
Start: 2025-02-19 | End: 2025-02-19

## 2025-02-19 RX ORDER — LIDOCAINE HYDROCHLORIDE 40 MG/ML
SOLUTION TOPICAL ONCE
OUTPATIENT
Start: 2025-02-19 | End: 2025-02-19

## 2025-02-19 RX ORDER — LIDOCAINE HYDROCHLORIDE 20 MG/ML
JELLY TOPICAL ONCE
OUTPATIENT
Start: 2025-02-19 | End: 2025-02-19

## 2025-02-19 RX ADMIN — LIDOCAINE HYDROCHLORIDE 6 ML: 20 JELLY TOPICAL at 13:42

## 2025-02-19 NOTE — PROGRESS NOTES
[facility]   Progress Note and Procedure Note      Theresa Crow  MEDICAL RECORD NUMBER:  [id number]  AGE: 79 y.o.   GENDER: female  : 1945  EPISODE DATE:  2025    Subjective:     Chief Complaint   Patient presents with    Wound Check     BLE         HISTORY of PRESENT ILLNESS HPI     Theresa Crow is a 79 y.o. female who presents today for wound/ulcer evaluation.     History of Wound Context: history of lymphedema and recalcitrant VLU. Just discharged from wound care in late October. She was having a hard time getting compression garments on due to recent gout flare. New blistered and ulcerated areas to BLE today.     Interval history: now using lymphedema pump, but unable to use on her LLE as it causes pain while she has the multilayer compression on.     Wound/Ulcer Pain Timing/Severity: mild  Quality of pain: burning, tender  Severity:  2 / 10         Wound/Ulcer Identification:  Ulcer Type: venous  Contributing Factors: edema, venous stasis, and lymphedema          PAST MEDICAL HISTORY        Diagnosis Date    Allergic rhinitis     Arthritis     Generalized    Borderline diabetes     Caffeine use     2 coffee day    COPD (chronic obstructive pulmonary disease) (HCC)     EARLY STAGE    GERD (gastroesophageal reflux disease)     Kidney stones     Lymphedema     Mumps     MVP (mitral valve prolapse)     Nausea & vomiting     Palpitations     PONV (postoperative nausea and vomiting)     Ringing in ears     Schamberg's disease     Thoracic outlet syndrome     Type II or unspecified type diabetes mellitus without mention of complication, not stated as uncontrolled     Borderline, no meds    Under care of team 2021    PCP: Janette Uribe, last visit 2021    Under care of team 2021    Cardiologist: Janette Melchor, last visit 2021    Under care of team 2021    Urology: Dr. Turner, last visit 2021    Under care of team

## 2025-02-19 NOTE — PROGRESS NOTES
Multilayer Compression Wrap   (Not Unna) Below the Knee    NAME:  Theresa Crow  YOB: 1945  MEDICAL RECORD NUMBER:  [id number]  DATE:  2/19/2025       [x] Removed old Multilayer wrap if indicated and wash leg with mild soap/water.  [x] Applied moisturizing agent to dry skin as needed.   [x] Applied primary and secondary dressing as ordered   [x] Applied multilayered dressing below the knee to Left lower leg(s).    [x] Instructed patient/caregiver not to remove dressing and to keep it clean and dry.   [x] Instructed patient/caregiver on complications to report to provider, such as pain, numbness in toes, heavy drainage, and slippage of dressing.   [x] Instructed patient on purpose of compression dressing and on activity and exercise recommendations.    Electronically signed by JHONY HUSTON RN on 2/19/2025 at 2:18 PM

## 2025-02-25 NOTE — DISCHARGE INSTRUCTIONS
[facility] -Phone: [phone] Fax: [phone]    Visit  Discharge Instructions / Physician Orders     DATE: 2/26/2025     Home Care: [facility]     SUPPLIES ORDERED THRU: Home Care     Wound Location: left lower leg     Cleanse with: keep dry and intact     Dressing Orders:           Right Leg: Velcro Compression Wraps, On during the day, off at night while in bed         Left Leg: calamine layer, 3 layer wrap                              .  Frequency:  left lower leg keep dry and intact     Additional Orders: Increase protein to diet (meat, cheese, eggs, fish, peanut butter, nuts and beans)  ELEVATE LEGS AS MUCH AS POSSIBLE  Use Lymphedema Pumps as directed     Your next appointment with Wound Care Center is in 1 week      (Please note your next appointment above and if you are unable to keep, kindly give a 24 hour notice. Thank you.)  If more than 15 min late we cannot guarantee you will be seen due to clinician schedule  Per Policy, Excessive cancellation will call for dismissal from program.     If you experience any of the following, please call the Wound Care Center during business hours:  [phone]     * Increase in Pain  * Temperature over 101  * Increase in drainage from your wound  * Drainage with a foul odor  * Bleeding  * Increase in swelling  * Need for compression bandage changes due to slippage, breakthrough drainage.     If you need medical attention outside of the business hours of the Wound Care Centers please contact your PCP or go to the an urgent care or emergency department     The information contained in the After Visit Summary has been reviewed with me, the patient and/or responsible adult, by my health care provider(s). I had the opportunity to ask questions regarding this information. I have elected to receive;      []After Visit Summary  [x]Comprehensive Discharge Instruction        Patient

## 2025-02-26 ENCOUNTER — HOSPITAL ENCOUNTER (OUTPATIENT)
Dept: WOUND CARE | Age: 80
Discharge: HOME | End: 2025-02-26
Payer: MEDICARE

## 2025-02-26 VITALS
HEART RATE: 53 BPM | SYSTOLIC BLOOD PRESSURE: 132 MMHG | RESPIRATION RATE: 16 BRPM | BODY MASS INDEX: 39.49 KG/M2 | WEIGHT: 237 LBS | HEIGHT: 65 IN | DIASTOLIC BLOOD PRESSURE: 78 MMHG | TEMPERATURE: 97.1 F

## 2025-02-26 DIAGNOSIS — E66.01 CLASS 3 SEVERE OBESITY DUE TO EXCESS CALORIES WITH SERIOUS COMORBIDITY AND BODY MASS INDEX (BMI) OF 45.0 TO 49.9 IN ADULT: ICD-10-CM

## 2025-02-26 DIAGNOSIS — I87.2 VENOUS STASIS DERMATITIS OF BOTH LOWER EXTREMITIES: ICD-10-CM

## 2025-02-26 DIAGNOSIS — I89.0 LYMPHEDEMA OF BOTH LOWER EXTREMITIES: ICD-10-CM

## 2025-02-26 DIAGNOSIS — E66.813 CLASS 3 SEVERE OBESITY DUE TO EXCESS CALORIES WITH SERIOUS COMORBIDITY AND BODY MASS INDEX (BMI) OF 45.0 TO 49.9 IN ADULT: ICD-10-CM

## 2025-02-26 DIAGNOSIS — E11.65 TYPE 2 DIABETES MELLITUS WITH HYPERGLYCEMIA, WITHOUT LONG-TERM CURRENT USE OF INSULIN (HCC): ICD-10-CM

## 2025-02-26 DIAGNOSIS — S81.802D LEG WOUND, LEFT, SUBSEQUENT ENCOUNTER: Primary | ICD-10-CM

## 2025-02-26 PROCEDURE — 29581 APPL MULTLAYER CMPRN SYS LEG: CPT

## 2025-02-26 PROCEDURE — 99211 OFF/OP EST MAY X REQ PHY/QHP: CPT

## 2025-02-26 PROCEDURE — 99212 OFFICE O/P EST SF 10 MIN: CPT

## 2025-02-26 RX ORDER — LIDOCAINE HYDROCHLORIDE 20 MG/ML
JELLY TOPICAL ONCE
Status: DISCONTINUED | OUTPATIENT
Start: 2025-02-26 | End: 2025-02-27 | Stop reason: HOSPADM

## 2025-02-26 RX ORDER — LIDOCAINE HYDROCHLORIDE 20 MG/ML
JELLY TOPICAL ONCE
OUTPATIENT
Start: 2025-02-26 | End: 2025-02-26

## 2025-02-26 RX ORDER — LIDOCAINE HYDROCHLORIDE 40 MG/ML
SOLUTION TOPICAL ONCE
OUTPATIENT
Start: 2025-02-26 | End: 2025-02-26

## 2025-02-26 ASSESSMENT — PAIN SCALES - GENERAL: PAINLEVEL_OUTOF10: 0

## 2025-02-26 NOTE — PROGRESS NOTES
[facility]   Progress Note and Procedure Note      Theresa Crow  MEDICAL RECORD NUMBER:  [id number]  AGE: 79 y.o.   GENDER: female  : 1945  EPISODE DATE:  2025    Subjective:     Chief Complaint   Patient presents with    Wound Check     LLE         HISTORY of PRESENT ILLNESS HPI     Theresa Crow is a 79 y.o. female who presents today for wound/ulcer evaluation.     History of Wound Context: history of lymphedema and recalcitrant VLU. Just discharged from wound care in late October. She was having a hard time getting compression garments on due to recent gout flare. New blistered and ulcerated areas to BLE today.     Interval history: not using lymphedema pump but maybe a couple times a week. She also is not good about wearing her compression garments. The RLE is quite edematous.      Wound/Ulcer Pain Timing/Severity: mild  Quality of pain: burning, tender  Severity:  2 / 10           PAST MEDICAL HISTORY        Diagnosis Date    Allergic rhinitis     Arthritis     Generalized    Borderline diabetes     Caffeine use     2 coffee day    COPD (chronic obstructive pulmonary disease) (HCC)     EARLY STAGE    GERD (gastroesophageal reflux disease)     Kidney stones     Lymphedema     Mumps     MVP (mitral valve prolapse)     Nausea & vomiting     Palpitations     PONV (postoperative nausea and vomiting)     Ringing in ears     Schamberg's disease     Thoracic outlet syndrome     Type II or unspecified type diabetes mellitus without mention of complication, not stated as uncontrolled     Borderline, no meds    Under care of team 2021    PCP: Janette Uribe, last visit 2021    Under care of team 2021    Cardiologist: Janette Melchor, last visit 2021    Under care of team 2021    Urology: Dr. Turner, last visit 2021    Under care of team 2021    Endo: Dr. Pelletier, [facility], last visit 2021       PAST SURGICAL

## 2025-03-03 NOTE — DISCHARGE INSTRUCTIONS
[facility] -Phone: [phone] Fax: [phone]    Visit  Discharge Instructions / Physician Orders     DATE: 3/6/2025     Home Care: [facility]     SUPPLIES ORDERED THRU: Home Care     Wound Location: left lower leg     Cleanse with: keep dry and intact     Dressing Orders: Right Leg: Maryann, calamine layer, 3 layer wraps                                Left Leg: Velcro Compression Wrap on during the day, off at night                                Frequency:  Right Lower Leg- Keep Dry and Intact     Additional Orders: Increase protein to diet (meat, cheese, eggs, fish, peanut butter, nuts and beans)  ELEVATE LEGS AS MUCH AS POSSIBLE  Use Lymphedema Pumps as directed     Your next appointment with Wound Care Center is in 1 week      (Please note your next appointment above and if you are unable to keep, kindly give a 24 hour notice. Thank you.)  If more than 15 min late we cannot guarantee you will be seen due to clinician schedule  Per Policy, Excessive cancellation will call for dismissal from program.     If you experience any of the following, please call the Wound Care Center during business hours:  [phone]     * Increase in Pain  * Temperature over 101  * Increase in drainage from your wound  * Drainage with a foul odor  * Bleeding  * Increase in swelling  * Need for compression bandage changes due to slippage, breakthrough drainage.     If you need medical attention outside of the business hours of the Wound Care Centers please contact your PCP or go to the an urgent care or emergency department     The information contained in the After Visit Summary has been reviewed with me, the patient and/or responsible adult, by my health care provider(s). I had the opportunity to ask questions regarding this information. I have elected to receive;      []After Visit Summary  [x]Comprehensive Discharge Instruction        Patient

## 2025-03-06 ENCOUNTER — HOSPITAL ENCOUNTER (OUTPATIENT)
Dept: WOUND CARE | Age: 80
Discharge: HOME | End: 2025-03-06
Payer: MEDICARE

## 2025-03-06 VITALS
WEIGHT: 237 LBS | BODY MASS INDEX: 39.49 KG/M2 | DIASTOLIC BLOOD PRESSURE: 61 MMHG | SYSTOLIC BLOOD PRESSURE: 118 MMHG | HEART RATE: 55 BPM | HEIGHT: 65 IN | TEMPERATURE: 96.8 F | RESPIRATION RATE: 20 BRPM

## 2025-03-06 DIAGNOSIS — I87.2 VENOUS STASIS DERMATITIS OF BOTH LOWER EXTREMITIES: ICD-10-CM

## 2025-03-06 DIAGNOSIS — S81.802D LEG WOUND, LEFT, SUBSEQUENT ENCOUNTER: Primary | ICD-10-CM

## 2025-03-06 DIAGNOSIS — S81.801D WOUND OF RIGHT LOWER EXTREMITY, SUBSEQUENT ENCOUNTER: ICD-10-CM

## 2025-03-06 DIAGNOSIS — E66.01 CLASS 3 SEVERE OBESITY DUE TO EXCESS CALORIES WITH SERIOUS COMORBIDITY AND BODY MASS INDEX (BMI) OF 45.0 TO 49.9 IN ADULT: ICD-10-CM

## 2025-03-06 DIAGNOSIS — E66.813 CLASS 3 SEVERE OBESITY DUE TO EXCESS CALORIES WITH SERIOUS COMORBIDITY AND BODY MASS INDEX (BMI) OF 45.0 TO 49.9 IN ADULT: ICD-10-CM

## 2025-03-06 DIAGNOSIS — I89.0 LYMPHEDEMA OF BOTH LOWER EXTREMITIES: ICD-10-CM

## 2025-03-06 DIAGNOSIS — E11.65 TYPE 2 DIABETES MELLITUS WITH HYPERGLYCEMIA, WITHOUT LONG-TERM CURRENT USE OF INSULIN (HCC): ICD-10-CM

## 2025-03-06 PROBLEM — S81.801A WOUND OF RIGHT LEG: Status: ACTIVE | Noted: 2025-03-06

## 2025-03-06 PROCEDURE — 99203 OFFICE O/P NEW LOW 30 MIN: CPT | Performed by: INTERNAL MEDICINE

## 2025-03-06 PROCEDURE — 29581 APPL MULTLAYER CMPRN SYS LEG: CPT

## 2025-03-06 RX ORDER — LIDOCAINE HYDROCHLORIDE 20 MG/ML
JELLY TOPICAL ONCE
Status: DISCONTINUED | OUTPATIENT
Start: 2025-03-06 | End: 2025-03-07 | Stop reason: HOSPADM

## 2025-03-06 RX ORDER — LIDOCAINE HYDROCHLORIDE 40 MG/ML
SOLUTION TOPICAL ONCE
OUTPATIENT
Start: 2025-03-06 | End: 2025-03-06

## 2025-03-06 RX ORDER — LIDOCAINE HYDROCHLORIDE 20 MG/ML
JELLY TOPICAL ONCE
OUTPATIENT
Start: 2025-03-06 | End: 2025-03-06

## 2025-03-06 NOTE — PROGRESS NOTES
Multilayer Compression Wrap   (Not Unna) Below the Knee    NAME:  Theresa Crow  YOB: 1945  MEDICAL RECORD NUMBER:  [id number]  DATE:  3/6/2025    Multilayer compression wrap: Removed old Multilayer wrap if indicated and wash leg with mild soap/water.  Applied moisturizing agent to dry skin as needed.   Applied primary and secondary dressing as ordered.  Applied multilayered dressing below the knee to right lower leg.  Instructed patient/caregiver not to remove dressing and to keep it clean and dry.   Instructed patient/caregiver on complications to report to provider, such as pain, numbness in toes, heavy drainage, and slippage of dressing.  Instructed patient on purpose of compression dressing and on activity and exercise recommendations.      Electronically signed by Sarah Petit RN on 3/6/2025 at 11:22 AM   
appointment with Wound Care Center is in 1 week      (Please note your next appointment above and if you are unable to keep, kindly give a 24 hour notice. Thank you.)  If more than 15 min late we cannot guarantee you will be seen due to clinician schedule  Per Policy, Excessive cancellation will call for dismissal from program.     If you experience any of the following, please call the Wound Care Center during business hours:  [phone]     * Increase in Pain  * Temperature over 101  * Increase in drainage from your wound  * Drainage with a foul odor  * Bleeding  * Increase in swelling  * Need for compression bandage changes due to slippage, breakthrough drainage.     If you need medical attention outside of the business hours of the Wound Care Centers please contact your PCP or go to the an urgent care or emergency department     The information contained in the After Visit Summary has been reviewed with me, the patient and/or responsible adult, by my health care provider(s). I had the opportunity to ask questions regarding this information. I have elected to receive;      []After Visit Summary  [x]Comprehensive Discharge Instruction        Patient signature______________________________________Date:________  Electronically signed by Kevin Car RN on 3/6/2025 at 11:13 AM        There are no Patient Instructions on file for this visit.     Electronically signed by Kori Carlson MD on 3/6/2025 at 11:14 AM

## 2025-03-10 NOTE — DISCHARGE INSTRUCTIONS
[facility] -Phone: [phone] Fax: [phone]    Visit  Discharge Instructions / Physician Orders     DATE: 3/12/2025     Home Care: [facility]     SUPPLIES ORDERED THRU: Home Care     Wound Location: left lower leg     Cleanse with: keep dry and intact     Dressing Orders: Right Leg:calamine layer, 3 layer wraps                                Left Leg: Maryann, calamine layer, 3 layer wrap                                Frequency:  Right Lower Leg- Keep Dry and Intact     Additional Orders: Increase protein to diet (meat, cheese, eggs, fish, peanut butter, nuts and beans)  ELEVATE LEGS AS MUCH AS POSSIBLE  Use Lymphedema Pumps as directed     Your next appointment with Wound Care Center is in 1 week      (Please note your next appointment above and if you are unable to keep, kindly give a 24 hour notice. Thank you.)  If more than 15 min late we cannot guarantee you will be seen due to clinician schedule  Per Policy, Excessive cancellation will call for dismissal from program.     If you experience any of the following, please call the Wound Care Center during business hours:  [phone]     * Increase in Pain  * Temperature over 101  * Increase in drainage from your wound  * Drainage with a foul odor  * Bleeding  * Increase in swelling  * Need for compression bandage changes due to slippage, breakthrough drainage.     If you need medical attention outside of the business hours of the Wound Care Centers please contact your PCP or go to the an urgent care or emergency department     The information contained in the After Visit Summary has been reviewed with me, the patient and/or responsible adult, by my health care provider(s). I had the opportunity to ask questions regarding this information. I have elected to receive;      []After Visit Summary  [x]Comprehensive Discharge Instruction        Patient signature______________________________________Date:________  Electronically signed by

## 2025-03-12 ENCOUNTER — HOSPITAL ENCOUNTER (OUTPATIENT)
Dept: WOUND CARE | Age: 80
Discharge: HOME | End: 2025-03-12
Payer: MEDICARE

## 2025-03-12 VITALS
HEART RATE: 57 BPM | TEMPERATURE: 97.6 F | RESPIRATION RATE: 16 BRPM | SYSTOLIC BLOOD PRESSURE: 150 MMHG | DIASTOLIC BLOOD PRESSURE: 67 MMHG

## 2025-03-12 DIAGNOSIS — I87.2 VENOUS STASIS DERMATITIS OF BOTH LOWER EXTREMITIES: ICD-10-CM

## 2025-03-12 DIAGNOSIS — E66.813 CLASS 3 SEVERE OBESITY DUE TO EXCESS CALORIES WITH SERIOUS COMORBIDITY AND BODY MASS INDEX (BMI) OF 45.0 TO 49.9 IN ADULT: ICD-10-CM

## 2025-03-12 DIAGNOSIS — I89.0 LYMPHEDEMA OF BOTH LOWER EXTREMITIES: ICD-10-CM

## 2025-03-12 DIAGNOSIS — S81.802D LEG WOUND, LEFT, SUBSEQUENT ENCOUNTER: Primary | ICD-10-CM

## 2025-03-12 DIAGNOSIS — E66.01 CLASS 3 SEVERE OBESITY DUE TO EXCESS CALORIES WITH SERIOUS COMORBIDITY AND BODY MASS INDEX (BMI) OF 45.0 TO 49.9 IN ADULT: ICD-10-CM

## 2025-03-12 DIAGNOSIS — E11.65 TYPE 2 DIABETES MELLITUS WITH HYPERGLYCEMIA, WITHOUT LONG-TERM CURRENT USE OF INSULIN (HCC): ICD-10-CM

## 2025-03-12 PROCEDURE — 29581 APPL MULTLAYER CMPRN SYS LEG: CPT

## 2025-03-12 PROCEDURE — 99212 OFFICE O/P EST SF 10 MIN: CPT

## 2025-03-12 RX ORDER — LIDOCAINE HYDROCHLORIDE 20 MG/ML
JELLY TOPICAL ONCE
Status: COMPLETED | OUTPATIENT
Start: 2025-03-12 | End: 2025-03-12

## 2025-03-12 RX ORDER — LIDOCAINE HYDROCHLORIDE 40 MG/ML
SOLUTION TOPICAL ONCE
OUTPATIENT
Start: 2025-03-12 | End: 2025-03-12

## 2025-03-12 RX ORDER — LIDOCAINE HYDROCHLORIDE 20 MG/ML
JELLY TOPICAL ONCE
OUTPATIENT
Start: 2025-03-12 | End: 2025-03-12

## 2025-03-12 RX ADMIN — LIDOCAINE HYDROCHLORIDE 6 ML: 20 JELLY TOPICAL at 14:05

## 2025-03-12 NOTE — PROGRESS NOTES
[facility]   Progress Note and Procedure Note      Theresa Crow  MEDICAL RECORD NUMBER:  [id number]  AGE: 80 y.o.   GENDER: female  : 1945  EPISODE DATE:  3/12/2025    Subjective:     Chief Complaint   Patient presents with    Wound Check     BLE         HISTORY of PRESENT ILLNESS HPI     Theresa Crow is a 80 y.o. female who presents today for wound/ulcer evaluation.     History of Wound Context: history of lymphedema and recalcitrant VLU. Just discharged from wound care in late October. She was having a hard time getting compression garments on due to recent gout flare. New blistered and ulcerated areas to BLE today.     Interval history: not using lymphedema pump, states it is too difficult to place on. She also is not good about wearing her compression garments historically. Was closed last week.      Wound/Ulcer Pain Timing/Severity: mild  Quality of pain: burning, tender  Severity:  2 / 10           PAST MEDICAL HISTORY        Diagnosis Date    Allergic rhinitis     Arthritis     Generalized    Borderline diabetes     Caffeine use     2 coffee day    COPD (chronic obstructive pulmonary disease) (HCC)     EARLY STAGE    GERD (gastroesophageal reflux disease)     Kidney stones     Lymphedema     Mumps     MVP (mitral valve prolapse)     Nausea & vomiting     Palpitations     PONV (postoperative nausea and vomiting)     Ringing in ears     Schamberg's disease     Thoracic outlet syndrome     Type II or unspecified type diabetes mellitus without mention of complication, not stated as uncontrolled     Borderline, no meds    Under care of team 2021    PCP: Janette Uribe, last visit 2021    Under care of team 2021    Cardiologist: Janette Melchor, last visit 2021    Under care of team 2021    Urology: Dr. Turner, last visit 2021    Under care of team 2021    Endo: Dr. Pelletier, [facility], last visit 2021

## 2025-03-12 NOTE — PROGRESS NOTES
Multilayer Compression Wrap   (Not Unna) Below the Knee    NAME:  Theresa Crow  YOB: 1945  MEDICAL RECORD NUMBER:  [id number]  DATE:  3/12/2025    Multilayer compression wrap: Removed old Multilayer wrap if indicated and wash leg with mild soap/water.  Applied moisturizing agent to dry skin as needed.   Applied primary and secondary dressing as ordered.  Applied multilayered dressing below the knee to right lower leg.  Applied multilayered dressing below the knee to left lower leg.  Instructed patient/caregiver not to remove dressing and to keep it clean and dry.   Instructed patient/caregiver on complications to report to provider, such as pain, numbness in toes, heavy drainage, and slippage of dressing.  Instructed patient on purpose of compression dressing and on activity and exercise recommendations.      Electronically signed by Tasha Heath RN on 3/12/2025 at 3:15 PM

## 2025-03-19 NOTE — DISCHARGE INSTRUCTIONS
[facility] -Phone: [phone] Fax: [phone]    Visit  Discharge Instructions / Physician Orders     DATE: 3/20/2025     Home Care: [facility]     SUPPLIES ORDERED THRU: Home Care     Wound Location: left lower leg     Cleanse with: keep dry and intact     Dressing Orders: Right Leg:calamine layer, 3 layer wraps                                Left Leg:  calamine layer, 3 layer wrap                                Frequency:  Right Lower Leg- Keep Dry and Intact     Additional Orders: Increase protein to diet (meat, cheese, eggs, fish, peanut butter, nuts and beans)  ELEVATE LEGS AS MUCH AS POSSIBLE  Use Lymphedema Pumps as directed     Your next appointment with Wound Care Center is in 1 week      (Please note your next appointment above and if you are unable to keep, kindly give a 24 hour notice. Thank you.)  If more than 15 min late we cannot guarantee you will be seen due to clinician schedule  Per Policy, Excessive cancellation will call for dismissal from program.     If you experience any of the following, please call the Wound Care Center during business hours:  [phone]     * Increase in Pain  * Temperature over 101  * Increase in drainage from your wound  * Drainage with a foul odor  * Bleeding  * Increase in swelling  * Need for compression bandage changes due to slippage, breakthrough drainage.     If you need medical attention outside of the business hours of the Wound Care Centers please contact your PCP or go to the an urgent care or emergency department     The information contained in the After Visit Summary has been reviewed with me, the patient and/or responsible adult, by my health care provider(s). I had the opportunity to ask questions regarding this information. I have elected to receive;      []After Visit Summary  [x]Comprehensive Discharge Instruction        Patient signature______________________________________Date:________  Electronically signed by Sarah BEY

## 2025-03-20 ENCOUNTER — HOSPITAL ENCOUNTER (OUTPATIENT)
Dept: WOUND CARE | Age: 80
Discharge: HOME | End: 2025-03-20
Payer: MEDICARE

## 2025-03-20 VITALS
SYSTOLIC BLOOD PRESSURE: 111 MMHG | RESPIRATION RATE: 18 BRPM | TEMPERATURE: 96.8 F | DIASTOLIC BLOOD PRESSURE: 58 MMHG | HEART RATE: 54 BPM

## 2025-03-20 DIAGNOSIS — E66.01 CLASS 3 SEVERE OBESITY DUE TO EXCESS CALORIES WITH SERIOUS COMORBIDITY AND BODY MASS INDEX (BMI) OF 45.0 TO 49.9 IN ADULT: ICD-10-CM

## 2025-03-20 DIAGNOSIS — E11.65 TYPE 2 DIABETES MELLITUS WITH HYPERGLYCEMIA, WITHOUT LONG-TERM CURRENT USE OF INSULIN (HCC): ICD-10-CM

## 2025-03-20 DIAGNOSIS — I87.2 VENOUS STASIS DERMATITIS OF BOTH LOWER EXTREMITIES: ICD-10-CM

## 2025-03-20 DIAGNOSIS — S81.802D LEG WOUND, LEFT, SUBSEQUENT ENCOUNTER: Primary | ICD-10-CM

## 2025-03-20 DIAGNOSIS — I89.0 LYMPHEDEMA OF BOTH LOWER EXTREMITIES: ICD-10-CM

## 2025-03-20 DIAGNOSIS — E66.813 CLASS 3 SEVERE OBESITY DUE TO EXCESS CALORIES WITH SERIOUS COMORBIDITY AND BODY MASS INDEX (BMI) OF 45.0 TO 49.9 IN ADULT: ICD-10-CM

## 2025-03-20 PROCEDURE — 29581 APPL MULTLAYER CMPRN SYS LEG: CPT

## 2025-03-20 PROCEDURE — 99212 OFFICE O/P EST SF 10 MIN: CPT

## 2025-03-20 RX ORDER — LIDOCAINE HYDROCHLORIDE 20 MG/ML
JELLY TOPICAL ONCE
OUTPATIENT
Start: 2025-03-20 | End: 2025-03-20

## 2025-03-20 RX ORDER — LIDOCAINE HYDROCHLORIDE 40 MG/ML
SOLUTION TOPICAL ONCE
Status: DISCONTINUED | OUTPATIENT
Start: 2025-03-20 | End: 2025-03-21 | Stop reason: HOSPADM

## 2025-03-20 RX ORDER — LIDOCAINE HYDROCHLORIDE 20 MG/ML
JELLY TOPICAL ONCE
Status: DISCONTINUED | OUTPATIENT
Start: 2025-03-20 | End: 2025-03-21 | Stop reason: HOSPADM

## 2025-03-20 RX ORDER — LIDOCAINE HYDROCHLORIDE 40 MG/ML
SOLUTION TOPICAL ONCE
OUTPATIENT
Start: 2025-03-20 | End: 2025-03-20

## 2025-03-20 ASSESSMENT — PAIN SCALES - GENERAL: PAINLEVEL_OUTOF10: 6

## 2025-03-20 NOTE — PROGRESS NOTES
Multilayer Compression Wrap   (Not Unna) Below the Knee    NAME:  Theresa Crow  YOB: 1945  MEDICAL RECORD NUMBER:  [id number]  DATE:  3/20/2025       [x] Removed old Multilayer wrap if indicated and wash leg with mild soap/water.  [x] Applied moisturizing agent to dry skin as needed.   [x] Applied primary and secondary dressing as ordered   [x] Applied multilayered dressing below the knee to Bilateral lower leg(s).    [x] Instructed patient/caregiver not to remove dressing and to keep it clean and dry.   [x] Instructed patient/caregiver on complications to report to provider, such as pain, numbness in toes, heavy drainage, and slippage of dressing.   [x] Instructed patient on purpose of compression dressing and on activity and exercise recommendations.    Electronically signed by JHONY HUSTON RN on 3/20/2025 at 1:45 PM

## 2025-03-20 NOTE — PROGRESS NOTES
[facility]   Progress Note and Procedure Note      Theresa Crow  MEDICAL RECORD NUMBER:  [id number]  AGE: 80 y.o.   GENDER: female  : 1945  EPISODE DATE:  3/20/2025    Subjective:     Chief Complaint   Patient presents with    Wound Check     legs         HISTORY of PRESENT ILLNESS HPI     Theresa Crow is a 80 y.o. female who presents today for wound/ulcer evaluation.     History of Wound Context: history of lymphedema and recalcitrant VLU. Just discharged from wound care in late October. She was having a hard time getting compression garments on due to recent gout flare. New blistered and ulcerated areas to BLE today.     Interval history: not using lymphedema pump, states it is too difficult to place on. She had called the lymphedema supply company, was told they do not make shorter cuffs. She also is not good about wearing her compression garments historically. Closed this week. Trying to make sleeping in a bed happen, working on getting a step stool.      Wound/Ulcer Pain Timing/Severity: mild  Quality of pain: burning, tender  Severity:  2 / 10           PAST MEDICAL HISTORY        Diagnosis Date    Allergic rhinitis     Arthritis     Generalized    Borderline diabetes     Caffeine use     2 coffee day    COPD (chronic obstructive pulmonary disease) ([facility])     EARLY STAGE    GERD (gastroesophageal reflux disease)     Kidney stones     Lymphedema     Mumps     MVP (mitral valve prolapse)     Nausea & vomiting     Palpitations     PONV (postoperative nausea and vomiting)     Ringing in ears     Schamberg's disease     Thoracic outlet syndrome     Type II or unspecified type diabetes mellitus without mention of complication, not stated as uncontrolled     Borderline, no meds    Under care of team 2021    PCP: Janette Uribe, last visit 2021    Under care of team 2021    Cardiologist: Janette Melchor, last visit 2021    Under care

## 2025-03-20 NOTE — PROGRESS NOTES
Compression Garments    Supply Company for Compression Stockings:     [employer] [address] [place] [address] f: [phone] f: [phone] p: [phone] [email or web address]      Ordering Center:     [facility]  [address] [place] [address]  [place] [address]  [phone]  WOUND CARE Dept: [phone]   FAX NUMBER [phone]    Patient Information:      Shilpa Crow  [address]  [facility] [address]   [phone]   : 1945  AGE: 80 y.o.     GENDER: female   TODAYS DATE:  3/20/2025    Insurance:      PRIMARY INSURANCE:  Plan: MEDICARE PART A AND B  Coverage: MEDICARE  Effective Date: 2011  Group Number: [unfilled]  Subscriber Number: [id number] - (Medicare)    Payer/Plan Subscr  Sex Relation Sub. Ins. ID Effective Group Num   1. [place] - [place]* SHILPA CROW 1945 Female Self [id number]                                    [address] [address]   2. [facility] NET* SHILPA CROW 1945 Female Self [id number]                                   [address] [address]       Patient Information:       Codes Comments   Leg wound, left, subsequent encounter  - Primary S81.802D    Lymphedema of both lower extremities I89.0    Venous stasis dermatitis of both lower extremities I87.2    Type 2 diabetes mellitus with hyperglycemia, without long-term current use of insulin (HCC) E11.65    Class 3 severe obesity due to excess calories with serious comorbidity and body mass index (BMI) of 45.0 to 49.9 in adult E66.813, Z68.42, E66.01          Incision 14 Leg Left (Active)   Number of days: 3937       Wound 25 Pretibial Left;Lateral #1 (Active)   Wound Image   25 1402   Wound Etiology Venous 25 1254   Dressing Status Old drainage noted;New drainage noted 25 1254   Wound Cleansed Soap and water 25 1254   Wound Length (cm) 0 cm 03/20/25 1254   Wound Width (cm) 0 cm 25 1254   Wound Depth (cm) 0 cm 25 1254   Wound

## 2025-03-24 NOTE — DISCHARGE INSTRUCTIONS
[facility] -Phone: [phone] Fax: [phone]    Visit  Discharge Instructions / Physician Orders     DATE: 3/27/2025     Home Care: [facility]     SUPPLIES ORDERED THRU: Home Care     Wound Location: left lower leg     Cleanse with: keep dry and intact     Dressing Orders: Right Leg: calamine layer, 3 layer wrap                                 Left Leg:  calamine layer, pad back of leg, 3 layer wrap                                Frequency: Keep Dry and Intact     Additional Orders: Increase protein to diet (meat, cheese, eggs, fish, peanut butter, nuts and beans)  ELEVATE LEGS AS MUCH AS POSSIBLE  Use Lymphedema Pumps as directed     Your next appointment with Wound Care Center is in 1 week      (Please note your next appointment above and if you are unable to keep, kindly give a 24 hour notice. Thank you.)  If more than 15 min late we cannot guarantee you will be seen due to clinician schedule  Per Policy, Excessive cancellation will call for dismissal from program.     If you experience any of the following, please call the Wound Care Center during business hours:  [phone]     * Increase in Pain  * Temperature over 101  * Increase in drainage from your wound  * Drainage with a foul odor  * Bleeding  * Increase in swelling  * Need for compression bandage changes due to slippage, breakthrough drainage.     If you need medical attention outside of the business hours of the Wound Care Centers please contact your PCP or go to the an urgent care or emergency department     The information contained in the After Visit Summary has been reviewed with me, the patient and/or responsible adult, by my health care provider(s). I had the opportunity to ask questions regarding this information. I have elected to receive;      []After Visit Summary  [x]Comprehensive Discharge Instruction        Patient signature______________________________________Date:________  Electronically signed by Kevin DIGGS

## 2025-03-27 ENCOUNTER — HOSPITAL ENCOUNTER (OUTPATIENT)
Dept: WOUND CARE | Age: 80
Discharge: HOME | End: 2025-03-27
Payer: MEDICARE

## 2025-03-27 VITALS
HEIGHT: 65 IN | WEIGHT: 237 LBS | BODY MASS INDEX: 39.49 KG/M2 | TEMPERATURE: 98.4 F | HEART RATE: 50 BPM | RESPIRATION RATE: 17 BRPM | SYSTOLIC BLOOD PRESSURE: 130 MMHG | DIASTOLIC BLOOD PRESSURE: 55 MMHG

## 2025-03-27 DIAGNOSIS — E66.01 CLASS 3 SEVERE OBESITY DUE TO EXCESS CALORIES WITH SERIOUS COMORBIDITY AND BODY MASS INDEX (BMI) OF 45.0 TO 49.9 IN ADULT: Primary | ICD-10-CM

## 2025-03-27 DIAGNOSIS — S81.802D LEG WOUND, LEFT, SUBSEQUENT ENCOUNTER: ICD-10-CM

## 2025-03-27 DIAGNOSIS — I87.2 VENOUS STASIS DERMATITIS OF BOTH LOWER EXTREMITIES: ICD-10-CM

## 2025-03-27 DIAGNOSIS — E11.65 TYPE 2 DIABETES MELLITUS WITH HYPERGLYCEMIA, WITHOUT LONG-TERM CURRENT USE OF INSULIN (HCC): ICD-10-CM

## 2025-03-27 DIAGNOSIS — I89.0 LYMPHEDEMA OF BOTH LOWER EXTREMITIES: ICD-10-CM

## 2025-03-27 DIAGNOSIS — E66.813 CLASS 3 SEVERE OBESITY DUE TO EXCESS CALORIES WITH SERIOUS COMORBIDITY AND BODY MASS INDEX (BMI) OF 45.0 TO 49.9 IN ADULT: Primary | ICD-10-CM

## 2025-03-27 PROCEDURE — 29581 APPL MULTLAYER CMPRN SYS LEG: CPT

## 2025-03-27 PROCEDURE — 99212 OFFICE O/P EST SF 10 MIN: CPT

## 2025-03-27 RX ORDER — LIDOCAINE HYDROCHLORIDE 20 MG/ML
JELLY TOPICAL ONCE
OUTPATIENT
Start: 2025-03-27 | End: 2025-03-27

## 2025-03-27 RX ORDER — LIDOCAINE HYDROCHLORIDE 40 MG/ML
SOLUTION TOPICAL ONCE
OUTPATIENT
Start: 2025-03-27 | End: 2025-03-27

## 2025-03-27 RX ORDER — LIDOCAINE HYDROCHLORIDE 20 MG/ML
JELLY TOPICAL ONCE
Status: DISCONTINUED | OUTPATIENT
Start: 2025-03-27 | End: 2025-03-28 | Stop reason: HOSPADM

## 2025-03-27 ASSESSMENT — PAIN SCALES - GENERAL: PAINLEVEL_OUTOF10: 0

## 2025-03-27 NOTE — PROGRESS NOTES
Multilayer Compression Wrap   (Not Unna) Below the Knee    NAME:  Theresa Crow  YOB: 1945  MEDICAL RECORD NUMBER:  [id number]  DATE:  3/27/2025       [x] Removed old Multilayer wrap if indicated and wash leg with mild soap/water.  [x] Applied moisturizing agent to dry skin as needed.   [x] Applied primary and secondary dressing as ordered   [x] Applied multilayered dressing below the knee to Bilateral lower leg(s).    [x] Instructed patient/caregiver not to remove dressing and to keep it clean and dry.   [x] Instructed patient/caregiver on complications to report to provider, such as pain, numbness in toes, heavy drainage, and slippage of dressing.   [x] Instructed patient on purpose of compression dressing and on activity and exercise recommendations.    Electronically signed by JHONY HUSTON RN on 3/27/2025 at 2:08 PM   
  03/27/25 107.5 kg (237 lb)   03/06/25 107.5 kg (237 lb)   02/28/25 107.5 kg (237 lb)       LABS    CBC:   Lab Results   Component Value Date/Time    WBC 6.8 01/10/2025 10:20 AM    RBC 3.71 01/10/2025 10:20 AM    HGB 10.6 01/10/2025 10:20 AM    HCT 33.8 01/10/2025 10:20 AM     CRP:   Lab Results   Component Value Date/Time    .8 08/23/2023 06:32 PM    CRP 92.4 08/23/2023 09:01 AM     CMP:  Creatinine:   Lab Results   Component Value Date/Time    CREATININE 1.0 01/10/2025 10:20 AM     HgBA1c:    Lab Results   Component Value Date/Time    LABA1C 6.2 09/26/2016 11:09 AM         PHYSICAL EXAM    General Appearance: alert and oriented to person, place and time, well-developed and well-nourished, in no acute distress  Skin: BLE wounds closed this week. No signs of infection.   Head: normocephalic and atraumatic  Pulmonary: normal air movement, no respiratory distress  Cardiovascular/Circulation: BLE 3+ edema, no cyanosis, palpable peripheral pulses  Extremities: no cyanosis and no clubbing   Musculoskeletal: no joint swelling, deformity or tenderness  Neurologic: gait, coordination normal and speech normal      Assessment:     Problem List Items Addressed This Visit          Circulatory    Venous stasis dermatitis of both lower extremities    Relevant Medications    lidocaine (XYLOCAINE) 2 % uro-jet    Other Relevant Orders    Initiate Outpatient Wound Care Protocol       Endocrine    Type 2 diabetes mellitus with hyperglycemia (HCC)    Relevant Medications    lidocaine (XYLOCAINE) 2 % uro-jet    Other Relevant Orders    Initiate Outpatient Wound Care Protocol       Other    Class 3 severe obesity due to excess calories with serious comorbidity and body mass index (BMI) of 45.0 to 49.9 in adult - Primary    Relevant Medications    lidocaine (XYLOCAINE) 2 % uro-jet    Other Relevant Orders    Initiate Outpatient Wound Care Protocol    Lymphedema of both lower extremities    Relevant Medications    lidocaine

## 2025-04-01 NOTE — DISCHARGE INSTRUCTIONS
[facility] -Phone: [phone] Fax: [phone]    Visit  Discharge Instructions / Physician Orders     DATE: 4/2/2025     Home Care: [facility]     SUPPLIES ORDERED THRU: Home Care     Wound Location: left lower leg-healed     Cleanse with: as per usual     Dressing Orders: velcro compression wraps on in the morning and off at bedtime                                Frequency: daily    Additional Orders: Increase protein to diet (meat, cheese, eggs, fish, peanut butter, nuts and beans)  ELEVATE LEGS AS MUCH AS POSSIBLE  Use Lymphedema Pumps as directed     Your next appointment with Wound Care Center is as needed     (Please note your next appointment above and if you are unable to keep, kindly give a 24 hour notice. Thank you.)  If more than 15 min late we cannot guarantee you will be seen due to clinician schedule  Per Policy, Excessive cancellation will call for dismissal from program.     If you experience any of the following, please call the Wound Care Center during business hours:  [phone]     * Increase in Pain  * Temperature over 101  * Increase in drainage from your wound  * Drainage with a foul odor  * Bleeding  * Increase in swelling  * Need for compression bandage changes due to slippage, breakthrough drainage.     If you need medical attention outside of the business hours of the Wound Care Centers please contact your PCP or go to the an urgent care or emergency department     The information contained in the After Visit Summary has been reviewed with me, the patient and/or responsible adult, by my health care provider(s). I had the opportunity to ask questions regarding this information. I have elected to receive;      []After Visit Summary  [x]Comprehensive Discharge Instruction        Patient signature______________________________________Date:________  Electronically signed by Sarah Petit RN on 4/2/2025 at 1:51 PM  Electronically signed by REMA Carroll CNP on

## 2025-04-02 ENCOUNTER — HOSPITAL ENCOUNTER (OUTPATIENT)
Dept: WOUND CARE | Age: 80
Discharge: HOME | End: 2025-04-02
Payer: MEDICARE

## 2025-04-02 VITALS
RESPIRATION RATE: 16 BRPM | SYSTOLIC BLOOD PRESSURE: 119 MMHG | HEART RATE: 56 BPM | DIASTOLIC BLOOD PRESSURE: 61 MMHG | TEMPERATURE: 97.6 F

## 2025-04-02 DIAGNOSIS — I89.0 LYMPHEDEMA OF BOTH LOWER EXTREMITIES: ICD-10-CM

## 2025-04-02 DIAGNOSIS — E11.65 TYPE 2 DIABETES MELLITUS WITH HYPERGLYCEMIA, WITHOUT LONG-TERM CURRENT USE OF INSULIN (HCC): ICD-10-CM

## 2025-04-02 DIAGNOSIS — E66.01 CLASS 3 SEVERE OBESITY DUE TO EXCESS CALORIES WITH SERIOUS COMORBIDITY AND BODY MASS INDEX (BMI) OF 45.0 TO 49.9 IN ADULT: Primary | ICD-10-CM

## 2025-04-02 DIAGNOSIS — E66.813 CLASS 3 SEVERE OBESITY DUE TO EXCESS CALORIES WITH SERIOUS COMORBIDITY AND BODY MASS INDEX (BMI) OF 45.0 TO 49.9 IN ADULT: Primary | ICD-10-CM

## 2025-04-02 DIAGNOSIS — I87.2 VENOUS STASIS DERMATITIS OF BOTH LOWER EXTREMITIES: ICD-10-CM

## 2025-04-02 DIAGNOSIS — S81.802D LEG WOUND, LEFT, SUBSEQUENT ENCOUNTER: ICD-10-CM

## 2025-04-02 PROCEDURE — 99211 OFF/OP EST MAY X REQ PHY/QHP: CPT

## 2025-04-02 PROCEDURE — 99212 OFFICE O/P EST SF 10 MIN: CPT

## 2025-04-02 RX ORDER — LIDOCAINE HYDROCHLORIDE 40 MG/ML
SOLUTION TOPICAL ONCE
OUTPATIENT
Start: 2025-04-02 | End: 2025-04-02

## 2025-04-02 RX ORDER — LIDOCAINE HYDROCHLORIDE 20 MG/ML
JELLY TOPICAL ONCE
Status: DISCONTINUED | OUTPATIENT
Start: 2025-04-02 | End: 2025-04-03 | Stop reason: HOSPADM

## 2025-04-02 RX ORDER — LIDOCAINE HYDROCHLORIDE 20 MG/ML
JELLY TOPICAL ONCE
OUTPATIENT
Start: 2025-04-02 | End: 2025-04-02

## 2025-04-02 NOTE — PROGRESS NOTES
Plan:     -Local wound care: use compression garments, discharge from wound care. Call as needed.     -Signs of wound infection/cellulitis present: Not applicable     -Return to clinic: 1 week(s)     -Education provided/reinforced for wound healing: discussed importance of leg elevation when sitting, sleeping in a bed at night, and performing calf muscle exercise to promote edema mgmt     -I have reviewed instructions with the patient, answering all questions to satisfaction    -Patient to call or return to clinic as needed if wound symptoms worsen or fail to improve as anticipated    -Provided with printed Discharge Instructions for wound management        Written patient dismissal instructions given to patient and signed by patient or POA.           Electronically signed by REMA Carroll CNP on 4/2/2025 at 2:37 PM

## 2025-04-07 ENCOUNTER — HOSPITAL ENCOUNTER (OUTPATIENT)
Age: 80
Setting detail: SPECIMEN
Discharge: HOME | End: 2025-04-07
Payer: MEDICARE

## 2025-04-07 ENCOUNTER — HOSPITAL ENCOUNTER (OUTPATIENT)
Dept: WOUND CARE | Age: 80
Discharge: HOME | End: 2025-04-07
Payer: MEDICARE

## 2025-04-07 VITALS
HEART RATE: 49 BPM | BODY MASS INDEX: 39.49 KG/M2 | TEMPERATURE: 96.5 F | HEIGHT: 65 IN | RESPIRATION RATE: 18 BRPM | WEIGHT: 237 LBS | DIASTOLIC BLOOD PRESSURE: 60 MMHG | SYSTOLIC BLOOD PRESSURE: 145 MMHG

## 2025-04-07 DIAGNOSIS — S81.801D WOUND OF RIGHT LOWER EXTREMITY, SUBSEQUENT ENCOUNTER: ICD-10-CM

## 2025-04-07 DIAGNOSIS — E11.65 TYPE 2 DIABETES MELLITUS WITH HYPERGLYCEMIA, WITHOUT LONG-TERM CURRENT USE OF INSULIN (HCC): ICD-10-CM

## 2025-04-07 DIAGNOSIS — E66.813 CLASS 3 SEVERE OBESITY DUE TO EXCESS CALORIES WITH SERIOUS COMORBIDITY AND BODY MASS INDEX (BMI) OF 45.0 TO 49.9 IN ADULT: ICD-10-CM

## 2025-04-07 DIAGNOSIS — S81.802D LEG WOUND, LEFT, SUBSEQUENT ENCOUNTER: Primary | ICD-10-CM

## 2025-04-07 DIAGNOSIS — I89.0 LYMPHEDEMA OF BOTH LOWER EXTREMITIES: ICD-10-CM

## 2025-04-07 DIAGNOSIS — I87.2 VENOUS STASIS DERMATITIS OF BOTH LOWER EXTREMITIES: ICD-10-CM

## 2025-04-07 LAB
ALBUMIN SERPL-MCNC: 4 G/DL (ref 3.5–5.2)
ALP SERPL-CCNC: 157 U/L (ref 35–104)
ALT SERPL-CCNC: 11 U/L (ref 10–35)
ANION GAP SERPL CALCULATED.3IONS-SCNC: 13 MMOL/L
AST SERPL-CCNC: 23 U/L (ref 10–35)
BASOPHILS # BLD: 0.05 K/UL (ref 0–0.2)
BASOPHILS NFR BLD: 1 % (ref 0–2)
BILIRUB SERPL-MCNC: 0.6 MG/DL (ref 0–1.2)
BILIRUB UR QL STRIP: NEGATIVE
BUN SERPL-MCNC: 36 MG/DL (ref 8–23)
BUN/CREAT SERPL: 36 (ref 9–20)
CALCIUM SERPL-MCNC: 10 MG/DL (ref 8.8–10.2)
CHLORIDE SERPL-SCNC: 103 MMOL/L (ref 98–107)
CLARITY UR: CLEAR
CO2 SERPL-SCNC: 26 MMOL/L (ref 20–31)
COLOR UR: YELLOW
COMMENT: NORMAL
CREAT SERPL-MCNC: 1 MG/DL (ref 0.5–0.9)
CREAT UR-MCNC: 92.5 MG/DL (ref 28–217)
EOSINOPHIL # BLD: 0.33 K/UL (ref 0–0.44)
EOSINOPHILS RELATIVE PERCENT: 6 % (ref 1–4)
ERYTHROCYTE [DISTWIDTH] IN BLOOD BY AUTOMATED COUNT: 15.9 % (ref 11.8–14.4)
EST. AVERAGE GLUCOSE BLD GHB EST-MCNC: 146 MG/DL
GFR, ESTIMATED: 57 ML/MIN/1.73M2
GLUCOSE SERPL-MCNC: 124 MG/DL (ref 82–115)
GLUCOSE UR STRIP-MCNC: NEGATIVE MG/DL
HBA1C MFR BLD: 6.7 % (ref 4–6)
HCT VFR BLD AUTO: 33.8 % (ref 36.3–47.1)
HGB BLD-MCNC: 11 G/DL (ref 11.9–15.1)
HGB UR QL STRIP.AUTO: NEGATIVE
IMM GRANULOCYTES # BLD AUTO: 0.01 K/UL (ref 0–0.3)
IMM GRANULOCYTES NFR BLD: 0 %
KETONES UR STRIP-MCNC: NEGATIVE MG/DL
LEUKOCYTE ESTERASE UR QL STRIP: NEGATIVE
LYMPHOCYTES NFR BLD: 1.12 K/UL (ref 1.1–3.7)
LYMPHOCYTES RELATIVE PERCENT: 22 % (ref 24–43)
MCH RBC QN AUTO: 30.2 PG (ref 25.2–33.5)
MCHC RBC AUTO-ENTMCNC: 32.5 G/DL (ref 28.4–34.8)
MCV RBC AUTO: 92.9 FL (ref 82.6–102.9)
MICROALBUMIN UR-MCNC: 21 MG/L (ref 0–20)
MICROALBUMIN/CREAT UR-RTO: 23 MCG/MG CREAT (ref 0–25)
MONOCYTES NFR BLD: 0.35 K/UL (ref 0.1–1.2)
MONOCYTES NFR BLD: 7 % (ref 3–12)
NEUTROPHILS NFR BLD: 64 % (ref 36–65)
NEUTS SEG NFR BLD: 3.27 K/UL (ref 1.5–8.1)
NITRITE UR QL STRIP: NEGATIVE
NRBC BLD-RTO: 0 PER 100 WBC
PH UR STRIP: 6 [PH] (ref 5–8)
PLATELET # BLD AUTO: 252 K/UL (ref 138–453)
PMV BLD AUTO: 11.1 FL (ref 8.1–13.5)
POTASSIUM SERPL-SCNC: 4.2 MMOL/L (ref 3.7–5.3)
PROT SERPL-MCNC: 6.4 G/DL (ref 6.6–8.7)
PROT UR STRIP-MCNC: NEGATIVE MG/DL
RBC # BLD AUTO: 3.64 M/UL (ref 3.95–5.11)
RBC # BLD: ABNORMAL 10*6/UL
SODIUM SERPL-SCNC: 142 MMOL/L (ref 136–145)
SP GR UR STRIP: 1.02 (ref 1–1.03)
T4 FREE SERPL-MCNC: 1.5 NG/DL (ref 0.9–1.7)
TSH SERPL DL<=0.05 MIU/L-ACNC: 1.2 UIU/ML (ref 0.27–4.2)
URATE SERPL-MCNC: 8.8 MG/DL (ref 2.4–5.7)
UROBILINOGEN UR STRIP-ACNC: NORMAL EU/DL (ref 0–1)
WBC OTHER # BLD: 5.1 K/UL (ref 3.5–11.3)

## 2025-04-07 PROCEDURE — 83036 HEMOGLOBIN GLYCOSYLATED A1C: CPT

## 2025-04-07 PROCEDURE — 82570 ASSAY OF URINE CREATININE: CPT

## 2025-04-07 PROCEDURE — 82043 UR ALBUMIN QUANTITATIVE: CPT

## 2025-04-07 PROCEDURE — 97598 DBRDMT OPN WND ADDL 20CM/<: CPT | Performed by: NURSE PRACTITIONER

## 2025-04-07 PROCEDURE — 85025 COMPLETE CBC W/AUTO DIFF WBC: CPT

## 2025-04-07 PROCEDURE — 84443 ASSAY THYROID STIM HORMONE: CPT

## 2025-04-07 PROCEDURE — 29581 APPL MULTLAYER CMPRN SYS LEG: CPT

## 2025-04-07 PROCEDURE — 97597 DBRDMT OPN WND 1ST 20 CM/<: CPT | Performed by: NURSE PRACTITIONER

## 2025-04-07 PROCEDURE — 80053 COMPREHEN METABOLIC PANEL: CPT

## 2025-04-07 PROCEDURE — 84439 ASSAY OF FREE THYROXINE: CPT

## 2025-04-07 PROCEDURE — 97597 DBRDMT OPN WND 1ST 20 CM/<: CPT

## 2025-04-07 PROCEDURE — 97598 DBRDMT OPN WND ADDL 20CM/<: CPT

## 2025-04-07 PROCEDURE — 81003 URINALYSIS AUTO W/O SCOPE: CPT

## 2025-04-07 PROCEDURE — 84550 ASSAY OF BLOOD/URIC ACID: CPT

## 2025-04-07 RX ORDER — LIDOCAINE HYDROCHLORIDE 40 MG/ML
SOLUTION TOPICAL ONCE
OUTPATIENT
Start: 2025-04-07 | End: 2025-04-07

## 2025-04-07 RX ORDER — LIDOCAINE HYDROCHLORIDE 20 MG/ML
JELLY TOPICAL ONCE
OUTPATIENT
Start: 2025-04-07 | End: 2025-04-07

## 2025-04-07 ASSESSMENT — ENCOUNTER SYMPTOMS
RHINORRHEA: 0
SHORTNESS OF BREATH: 0
COUGH: 0
DIARRHEA: 0
NAUSEA: 0
VOMITING: 0

## 2025-04-07 NOTE — PROGRESS NOTES
Multilayer Compression Wrap   (Not Unna) Below the Knee    NAME:  Theresa Crow  YOB: 1945  MEDICAL RECORD NUMBER:  [id number]  DATE:  4/7/2025    Multilayer compression wrap: Removed old Multilayer wrap if indicated and wash leg with mild soap/water.  Applied moisturizing agent to dry skin as needed.   Applied primary and secondary dressing as ordered.  Applied multilayered dressing below the knee to right lower leg.  Applied multilayered dressing below the knee to left lower leg.  Instructed patient/caregiver not to remove dressing and to keep it clean and dry.   Instructed patient/caregiver on complications to report to provider, such as pain, numbness in toes, heavy drainage, and slippage of dressing.  Instructed patient on purpose of compression dressing and on activity and exercise recommendations.      Electronically signed by Sarah Petit RN on 4/7/2025 at 3:01 PM   
with saline 04/07/25 1405   Wound Length (cm) 10 cm 04/07/25 1405   Wound Width (cm) 10.5 cm 04/07/25 1405   Wound Depth (cm) 0.1 cm 04/07/25 1405   Wound Surface Area (cm^2) 105 cm^2 04/07/25 1405   Wound Volume (cm^3) 10.5 cm^3 04/07/25 1405   Post-Procedure Length (cm) 10 cm 04/07/25 1405   Post-Procedure Width (cm) 10.5 cm 04/07/25 1405   Post-Procedure Depth (cm) 0.1 cm 04/07/25 1405   Post-Procedure Surface Area (cm^2) 105 cm^2 04/07/25 1405   Post-Procedure Volume (cm^3) 10.5 cm^3 04/07/25 1405   Wound Assessment Pink/red;Fluid filled blister 04/07/25 1405   Drainage Amount Moderate (25-50%) 04/07/25 1405   Drainage Description Serous 04/07/25 1405   Odor None 04/07/25 1405   Mckayla-wound Assessment Blanchable erythema 04/07/25 1405   Margins Defined edges 04/07/25 1405   Wound Thickness Description not for Pressure Injury Full thickness 04/07/25 1405   Number of days: 0          Percent of Wound(s)/Ulcer(s) Debrided: 30%    Total Surface Area Debrided:  31.53 sq cm     Estimated Blood Loss:  Minimal    Hemostasis Achieved:  by pressure    Procedural Pain:  0  / 10     Post Procedural Pain:  0 / 10     Response to treatment:  Well tolerated by patient.       Plan:     Treatment Note please see Discharge Instructions    Written patient dismissal instructions given to patient and signed by patient or POA.           Electronically signed by REMA CABAN CNP on 4/7/2025 at 2:42 PM

## 2025-04-07 NOTE — DISCHARGE INSTRUCTIONS
signature______________________________________Date:________  Electronically signed by Kevin Car RN on 4/7/2025 at 2:29 PM  Electronically signed by REMA CABAN CNP on 4/7/2025 at 2:20 PM

## 2025-04-15 NOTE — DISCHARGE INSTRUCTIONS
[facility] -Phone: [phone] Fax: [phone]    Visit  Discharge Instructions / Physician Orders     DATE: 4/17/2025     Home Care: [facility]     SUPPLIES ORDERED THRU: Home Care     Wound Location: bilateral lower legs     Cleanse with: keep dry and intact     Dressing Orders: Right Leg: Silvercel, Kerramax, Calamine Layer, 3 layer compression wrap                                 Left Leg:  Silvercel, Kerramax, Calamine Layer, 3 layer compression wrap                                Frequency: Home Care to change on Friday-home health please show patient how to use lymphedema pumps     Additional Orders: Increase protein to diet (meat, cheese, eggs, fish, peanut butter, nuts and beans)  ELEVATE LEGS AS MUCH AS POSSIBLE  Use Lymphedema Pumps as directed  Referral sent to lymphedema clinic [phone]     Your next appointment with Wound Care Center is in 1 week      (Please note your next appointment above and if you are unable to keep, kindly give a 24 hour notice. Thank you.)  If more than 15 min late we cannot guarantee you will be seen due to clinician schedule  Per Policy, Excessive cancellation will call for dismissal from program.     If you experience any of the following, please call the Wound Care Center during business hours:  [phone]     * Increase in Pain  * Temperature over 101  * Increase in drainage from your wound  * Drainage with a foul odor  * Bleeding  * Increase in swelling  * Need for compression bandage changes due to slippage, breakthrough drainage.     If you need medical attention outside of the business hours of the Wound Care Centers please contact your PCP or go to the an urgent care or emergency department     The information contained in the After Visit Summary has been reviewed with me, the patient and/or responsible adult, by my health care provider(s). I had the opportunity to ask questions regarding this information. I have elected to receive;

## 2025-04-17 ENCOUNTER — HOSPITAL ENCOUNTER (OUTPATIENT)
Dept: WOUND CARE | Age: 80
Discharge: HOME | End: 2025-04-17
Payer: MEDICARE

## 2025-04-17 VITALS
SYSTOLIC BLOOD PRESSURE: 127 MMHG | DIASTOLIC BLOOD PRESSURE: 51 MMHG | TEMPERATURE: 97.2 F | RESPIRATION RATE: 16 BRPM | HEART RATE: 56 BPM

## 2025-04-17 DIAGNOSIS — E11.65 TYPE 2 DIABETES MELLITUS WITH HYPERGLYCEMIA, WITHOUT LONG-TERM CURRENT USE OF INSULIN (HCC): ICD-10-CM

## 2025-04-17 DIAGNOSIS — I87.2 VENOUS STASIS DERMATITIS OF BOTH LOWER EXTREMITIES: ICD-10-CM

## 2025-04-17 DIAGNOSIS — I89.0 LYMPHEDEMA OF BOTH LOWER EXTREMITIES: ICD-10-CM

## 2025-04-17 DIAGNOSIS — S81.802D LEG WOUND, LEFT, SUBSEQUENT ENCOUNTER: Primary | ICD-10-CM

## 2025-04-17 DIAGNOSIS — S81.801D WOUND OF RIGHT LOWER EXTREMITY, SUBSEQUENT ENCOUNTER: ICD-10-CM

## 2025-04-17 DIAGNOSIS — E66.813 CLASS 3 SEVERE OBESITY DUE TO EXCESS CALORIES WITH SERIOUS COMORBIDITY AND BODY MASS INDEX (BMI) OF 45.0 TO 49.9 IN ADULT: ICD-10-CM

## 2025-04-17 PROCEDURE — 11042 DBRDMT SUBQ TIS 1ST 20SQCM/<: CPT

## 2025-04-17 PROCEDURE — 11045 DBRDMT SUBQ TISS EACH ADDL: CPT

## 2025-04-17 RX ORDER — LIDOCAINE HYDROCHLORIDE 40 MG/ML
SOLUTION TOPICAL ONCE
OUTPATIENT
Start: 2025-04-17 | End: 2025-04-17

## 2025-04-17 RX ORDER — LIDOCAINE HYDROCHLORIDE 20 MG/ML
JELLY TOPICAL ONCE
OUTPATIENT
Start: 2025-04-17 | End: 2025-04-17

## 2025-04-17 RX ORDER — LIDOCAINE HYDROCHLORIDE 20 MG/ML
JELLY TOPICAL ONCE
Status: COMPLETED | OUTPATIENT
Start: 2025-04-17 | End: 2025-04-17

## 2025-04-17 RX ADMIN — LIDOCAINE HYDROCHLORIDE 6 ML: 20 JELLY TOPICAL at 13:46

## 2025-04-17 NOTE — PROGRESS NOTES
Multilayer Compression Wrap   (Not Unna) Below the Knee    NAME:  Theersa Crow  YOB: 1945  MEDICAL RECORD NUMBER:  [id number]  DATE:  4/17/2025    Multilayer compression wrap: Removed old Multilayer wrap if indicated and wash leg with mild soap/water.  Applied moisturizing agent to dry skin as needed.   Applied primary and secondary dressing as ordered.  Applied multilayered dressing below the knee to right lower leg.  Applied multilayered dressing below the knee to left lower leg.  Instructed patient/caregiver not to remove dressing and to keep it clean and dry.   Instructed patient/caregiver on complications to report to provider, such as pain, numbness in toes, heavy drainage, and slippage of dressing.  Instructed patient on purpose of compression dressing and on activity and exercise recommendations.      Electronically signed by Kevin Car RN on 4/17/2025 at 2:31 PM    
mouth 3 times daily HOLD IF SBP >140. 90 tablet 11    metoprolol tartrate (LOPRESSOR) 50 MG tablet Take 1 tablet by mouth 2 times daily (Patient taking differently: Take 0.5 tablets by mouth 2 times daily) 180 tablet 3    ELIQUIS 5 MG TABS tablet TAKE 1 TABLET BY MOUTH TWICE  DAILY 180 tablet 3    allopurinol (ZYLOPRIM) 100 MG tablet Take 1 tablet by mouth daily      bumetanide (BUMEX) 0.5 MG tablet Take 2 tablets by mouth daily      traMADol (ULTRAM) 50 MG tablet Take 1 tablet by mouth daily as needed for Pain.      SYNTHROID 137 MCG tablet Take 1 tablet by mouth Daily      Cholecalciferol (VITAMIN D3) 125 MCG (5000 UT) TABS Take 2,000 Units by mouth daily       montelukast (SINGULAIR) 10 MG tablet Take 1 tablet by mouth nightly      gemfibrozil (LOPID) 600 MG tablet Take 1 tablet by mouth daily      oxyCODONE-acetaminophen (PERCOCET) 5-325 MG per tablet Take 1 tablet by mouth daily as needed for Pain.      omeprazole (PRILOSEC) 40 MG capsule Take 1 capsule by mouth as needed      fluticasone-salmeterol (ADVAIR) 250-50 MCG/DOSE AEPB Inhale 1 puff into the lungs as needed       No current facility-administered medications on file prior to encounter.       REVIEW OF SYSTEMS    Constitutional: negative for chills and fevers  Respiratory: negative for cough and shortness of breath  Cardiovascular: positive for lower extremity edema, negative for chest pain and palpitations  Gastrointestinal: negative for abdominal pain and nausea  Genitourinary:negative  Musculoskeletal: General Physical Debility  Behavioral/Psych: negative  Hematologic/Immunologic: negative    Objective:      BP (!) 127/51   Pulse 56   Temp 97.2 °F (36.2 °C)   Resp 16     Wt Readings from Last 3 Encounters:   04/07/25 107.5 kg (237 lb)   03/27/25 107.5 kg (237 lb)   03/06/25 107.5 kg (237 lb)       LABS    CBC:   Lab Results   Component Value Date/Time    WBC 5.1 04/07/2025 10:00 AM    RBC 3.64 04/07/2025 10:00 AM    HGB 11.0 04/07/2025 10:00 AM

## 2025-04-22 NOTE — DISCHARGE INSTRUCTIONS
[facility] -Phone: [phone] Fax: [phone]    Visit  Discharge Instructions / Physician Orders     DATE: 4/24/2025     Home Care: [facility]     SUPPLIES ORDERED THRU: Home Care     Wound Location: right lower leg     Cleanse with: keep dry and intact     Dressing Orders: Right Leg: Calamine Layer, 3 layer compression wrap                                 Left Leg:  Silvercel, Kerramax, pad area of wound, Calamine Layer, 3 layer compression wrap                                Frequency: Home Care to change on Friday-home health please show patient how to use lymphedema pumps     Additional Orders: Increase protein to diet (meat, cheese, eggs, fish, peanut butter, nuts and beans)  ELEVATE LEGS AS MUCH AS POSSIBLE  Use Lymphedema Pumps as directed  Referral sent to lymphedema clinic [phone]     Your next appointment with Wound Care Center is in 1 week      (Please note your next appointment above and if you are unable to keep, kindly give a 24 hour notice. Thank you.)  If more than 15 min late we cannot guarantee you will be seen due to clinician schedule  Per Policy, Excessive cancellation will call for dismissal from program.     If you experience any of the following, please call the Wound Care Center during business hours:  [phone]     * Increase in Pain  * Temperature over 101  * Increase in drainage from your wound  * Drainage with a foul odor  * Bleeding  * Increase in swelling  * Need for compression bandage changes due to slippage, breakthrough drainage.     If you need medical attention outside of the business hours of the Wound Care Centers please contact your PCP or go to the an urgent care or emergency department     The information contained in the After Visit Summary has been reviewed with me, the patient and/or responsible adult, by my health care provider(s). I had the opportunity to ask questions regarding this information. I have elected to receive;      []After

## 2025-04-24 ENCOUNTER — HOSPITAL ENCOUNTER (OUTPATIENT)
Dept: WOUND CARE | Age: 80
Discharge: HOME | End: 2025-04-24
Payer: MEDICARE

## 2025-04-24 VITALS
HEART RATE: 54 BPM | DIASTOLIC BLOOD PRESSURE: 65 MMHG | SYSTOLIC BLOOD PRESSURE: 126 MMHG | TEMPERATURE: 97.9 F | RESPIRATION RATE: 16 BRPM

## 2025-04-24 DIAGNOSIS — E66.813 CLASS 3 SEVERE OBESITY DUE TO EXCESS CALORIES WITH SERIOUS COMORBIDITY AND BODY MASS INDEX (BMI) OF 45.0 TO 49.9 IN ADULT (HCC): Primary | ICD-10-CM

## 2025-04-24 DIAGNOSIS — I89.0 LYMPHEDEMA OF BOTH LOWER EXTREMITIES: ICD-10-CM

## 2025-04-24 DIAGNOSIS — I87.2 VENOUS STASIS DERMATITIS OF BOTH LOWER EXTREMITIES: ICD-10-CM

## 2025-04-24 DIAGNOSIS — S81.802D LEG WOUND, LEFT, SUBSEQUENT ENCOUNTER: ICD-10-CM

## 2025-04-24 DIAGNOSIS — E11.65 TYPE 2 DIABETES MELLITUS WITH HYPERGLYCEMIA, WITHOUT LONG-TERM CURRENT USE OF INSULIN (HCC): ICD-10-CM

## 2025-04-24 DIAGNOSIS — S81.801D WOUND OF RIGHT LOWER EXTREMITY, SUBSEQUENT ENCOUNTER: ICD-10-CM

## 2025-04-24 PROCEDURE — 29581 APPL MULTLAYER CMPRN SYS LEG: CPT

## 2025-04-24 PROCEDURE — 11042 DBRDMT SUBQ TIS 1ST 20SQCM/<: CPT

## 2025-04-24 RX ORDER — LIDOCAINE HYDROCHLORIDE 20 MG/ML
JELLY TOPICAL ONCE
OUTPATIENT
Start: 2025-04-24 | End: 2025-04-24

## 2025-04-24 RX ORDER — LIDOCAINE HYDROCHLORIDE 20 MG/ML
JELLY TOPICAL ONCE
Status: COMPLETED | OUTPATIENT
Start: 2025-04-24 | End: 2025-04-24

## 2025-04-24 RX ORDER — LIDOCAINE HYDROCHLORIDE 40 MG/ML
SOLUTION TOPICAL ONCE
OUTPATIENT
Start: 2025-04-24 | End: 2025-04-24

## 2025-04-24 RX ADMIN — LIDOCAINE HYDROCHLORIDE 6 ML: 20 JELLY TOPICAL at 14:36

## 2025-04-24 NOTE — PROGRESS NOTES
Multilayer Compression Wrap   (Not Unna) Below the Knee    NAME:  Theresa Crow  YOB: 1945  MEDICAL RECORD NUMBER:  [id number]  DATE:  4/24/2025    Multilayer compression wrap: Removed old Multilayer wrap if indicated and wash leg with mild soap/water.  Applied moisturizing agent to dry skin as needed.   Applied primary and secondary dressing as ordered.  Applied multilayered dressing below the knee to right lower leg.  Applied multilayered dressing below the knee to left lower leg.  Instructed patient/caregiver not to remove dressing and to keep it clean and dry.   Instructed patient/caregiver on complications to report to provider, such as pain, numbness in toes, heavy drainage, and slippage of dressing.  Instructed patient on purpose of compression dressing and on activity and exercise recommendations.      Electronically signed by Tasha Heath RN on 4/24/2025 at 3:02 PM   
04/17/25 1320   Wound Thickness Description not for Pressure Injury Full thickness 04/17/25 1320   Number of days: 17       Wound 04/17/25 Leg Left;Proximal;Lower #3 Cluster (Active)   Wound Image   04/17/25 1320   Wound Etiology Venous 04/24/25 1429   Dressing Status Old drainage noted;New drainage noted 04/24/25 1429   Wound Cleansed Soap and water 04/24/25 1429   Dressing/Treatment Other (comment) 04/17/25 1426   Wound Length (cm) 4.5 cm 04/24/25 1429   Wound Width (cm) 2 cm 04/24/25 1429   Wound Depth (cm) 0.1 cm 04/24/25 1429   Wound Surface Area (cm^2) 9 cm^2 04/24/25 1429   Change in Wound Size % (l*w) 71.43 04/24/25 1429   Wound Volume (cm^3) 0.9 cm^3 04/24/25 1429   Wound Healing % 71 04/24/25 1429   Post-Procedure Length (cm) 4.5 cm 04/24/25 1429   Post-Procedure Width (cm) 2 cm 04/24/25 1429   Post-Procedure Depth (cm) 0.1 cm 04/24/25 1429   Post-Procedure Surface Area (cm^2) 9 cm^2 04/24/25 1429   Post-Procedure Volume (cm^3) 0.9 cm^3 04/24/25 1429   Wound Assessment Pink/red 04/24/25 1429   Drainage Amount Moderate (25-50%) 04/24/25 1429   Drainage Description Serosanguinous 04/17/25 1320   Odor None 04/24/25 1429   Mckayla-wound Assessment Blanchable erythema 04/24/25 1429   Margins Defined edges 04/24/25 1429   Wound Thickness Description not for Pressure Injury Full thickness 04/24/25 1429   Number of days: 7       Wound 04/24/25 Knee Left;Lateral #4 (Active)   Wound Etiology Venous 04/24/25 1429   Dressing Status Old drainage noted;New drainage noted 04/24/25 1429   Wound Cleansed Soap and water 04/24/25 1429   Wound Length (cm) 1 cm 04/24/25 1429   Wound Width (cm) 2.2 cm 04/24/25 1429   Wound Depth (cm) 0.1 cm 04/24/25 1429   Wound Surface Area (cm^2) 2.2 cm^2 04/24/25 1429   Wound Volume (cm^3) 0.22 cm^3 04/24/25 1429   Post-Procedure Length (cm) 1 cm 04/24/25 1429   Post-Procedure Width (cm) 2.2 cm 04/24/25 1429   Post-Procedure Depth (cm) 0.1 cm 04/24/25 1429   Post-Procedure Surface Area (cm^2)

## 2025-04-30 NOTE — DISCHARGE INSTRUCTIONS
[facility] -Phone: [phone] Fax: [phone]    Visit  Discharge Instructions / Physician Orders     DATE: 5/1/2025     Home Care: [facility]     SUPPLIES ORDERED THRU: Home Care     Wound Location: right lower leg     Cleanse with: keep dry and intact     Dressing Orders: Right Leg: Velcro Compression Wrap on during the day, off at night while in bed                                 Left Leg:  Silvercel, Kerramax, pad area of wound, Calamine Layer, 3 layer compression wrap                                Frequency: Home Care to change on Monday- home health please show patient how to use lymphedema pumps     Additional Orders: Increase protein to diet (meat, cheese, eggs, fish, peanut butter, nuts and beans)  ELEVATE LEGS AS MUCH AS POSSIBLE  Use Lymphedema Pumps as directed  Referral sent to lymphedema clinic [phone]     Your next appointment with Wound Care Center is in 1 week      (Please note your next appointment above and if you are unable to keep, kindly give a 24 hour notice. Thank you.)  If more than 15 min late we cannot guarantee you will be seen due to clinician schedule  Per Policy, Excessive cancellation will call for dismissal from program.     If you experience any of the following, please call the Wound Care Center during business hours:  [phone]     * Increase in Pain  * Temperature over 101  * Increase in drainage from your wound  * Drainage with a foul odor  * Bleeding  * Increase in swelling  * Need for compression bandage changes due to slippage, breakthrough drainage.     If you need medical attention outside of the business hours of the Wound Care Centers please contact your PCP or go to the an urgent care or emergency department     The information contained in the After Visit Summary has been reviewed with me, the patient and/or responsible adult, by my health care provider(s). I had the opportunity to ask questions regarding this information. I have

## 2025-05-01 ENCOUNTER — HOSPITAL ENCOUNTER (OUTPATIENT)
Dept: WOUND CARE | Age: 80
Discharge: HOME | End: 2025-05-01
Payer: MEDICARE

## 2025-05-01 VITALS — TEMPERATURE: 96.5 F | SYSTOLIC BLOOD PRESSURE: 150 MMHG | HEART RATE: 51 BPM | DIASTOLIC BLOOD PRESSURE: 48 MMHG

## 2025-05-01 DIAGNOSIS — I89.0 LYMPHEDEMA OF BOTH LOWER EXTREMITIES: ICD-10-CM

## 2025-05-01 DIAGNOSIS — S81.801D WOUND OF RIGHT LOWER EXTREMITY, SUBSEQUENT ENCOUNTER: ICD-10-CM

## 2025-05-01 DIAGNOSIS — S81.802D LEG WOUND, LEFT, SUBSEQUENT ENCOUNTER: ICD-10-CM

## 2025-05-01 DIAGNOSIS — E66.813 CLASS 3 SEVERE OBESITY DUE TO EXCESS CALORIES WITH SERIOUS COMORBIDITY AND BODY MASS INDEX (BMI) OF 45.0 TO 49.9 IN ADULT (HCC): Primary | ICD-10-CM

## 2025-05-01 DIAGNOSIS — E11.65 TYPE 2 DIABETES MELLITUS WITH HYPERGLYCEMIA, WITHOUT LONG-TERM CURRENT USE OF INSULIN (HCC): ICD-10-CM

## 2025-05-01 DIAGNOSIS — I87.2 VENOUS STASIS DERMATITIS OF BOTH LOWER EXTREMITIES: ICD-10-CM

## 2025-05-01 PROCEDURE — 11042 DBRDMT SUBQ TIS 1ST 20SQCM/<: CPT

## 2025-05-01 RX ORDER — LIDOCAINE HYDROCHLORIDE 20 MG/ML
JELLY TOPICAL ONCE
OUTPATIENT
Start: 2025-05-01 | End: 2025-05-01

## 2025-05-01 RX ORDER — LIDOCAINE HYDROCHLORIDE 40 MG/ML
SOLUTION TOPICAL ONCE
OUTPATIENT
Start: 2025-05-01 | End: 2025-05-01

## 2025-05-01 RX ORDER — LIDOCAINE HYDROCHLORIDE 20 MG/ML
JELLY TOPICAL ONCE
Status: COMPLETED | OUTPATIENT
Start: 2025-05-01 | End: 2025-05-01

## 2025-05-01 RX ADMIN — LIDOCAINE HYDROCHLORIDE 6 ML: 20 JELLY TOPICAL at 10:50

## 2025-05-01 NOTE — PROGRESS NOTES
[facility]   Progress Note and Procedure Note      Theresa Crow  MEDICAL RECORD NUMBER:  [id number]  AGE: 80 y.o.   GENDER: female  : 1945  EPISODE DATE:  2025    Subjective:     Chief Complaint   Patient presents with    Wound Check     Ble           HISTORY of PRESENT ILLNESS HPI     Theresa Crow is a 80 y.o. female who presents today for wound/ulcer evaluation.   History of Wound Context: The patient is here for follow-up on bilateral lower extremity lymphedema, left leg wounds with no purulent drainage, no significant redness.  She is complaining of pain to the left leg ,denied fever or chills, no other complaints      Ulcer Identification:  Ulcer Type: venous    Contributing Factors: edema, venous stasis, and lymphedema    Acute Wound: N/A    PAST MEDICAL HISTORY        Diagnosis Date    Allergic rhinitis     Arthritis     Generalized    Borderline diabetes     Caffeine use     2 coffee day    COPD (chronic obstructive pulmonary disease) (HCC)     EARLY STAGE    GERD (gastroesophageal reflux disease)     Kidney stones     Lymphedema     Mumps     MVP (mitral valve prolapse)     Nausea & vomiting     Palpitations     PONV (postoperative nausea and vomiting)     Ringing in ears     Schamberg's disease     Thoracic outlet syndrome     Type II or unspecified type diabetes mellitus without mention of complication, not stated as uncontrolled     Borderline, no meds    Under care of team 2021    PCP: Janette Uribe, last visit 2021    Under care of team 2021    Cardiologist: Janette Melchor, last visit 2021    Under care of team 2021    Urology: Dr. Turner, last visit 2021    Under care of team 2021    Endo: Dr. Pelletier, [facility], last visit 2021       PAST SURGICAL HISTORY    Past Surgical History:   Procedure Laterality Date    ACHILLES TENDON SURGERY Bilateral     ANGIOPLASTY      APPENDECTOMY      BACK

## 2025-05-01 NOTE — PROGRESS NOTES
Multilayer Compression Wrap   (Not Unna) Below the Knee    NAME:  Theresa Crow  YOB: 1945  MEDICAL RECORD NUMBER:  [id number]  DATE:  5/1/2025    Multilayer compression wrap: Removed old Multilayer wrap if indicated and wash leg with mild soap/water.  Applied moisturizing agent to dry skin as needed.   Applied primary and secondary dressing as ordered.  Applied multilayered dressing below the knee to left lower leg.  Instructed patient/caregiver not to remove dressing and to keep it clean and dry.   Instructed patient/caregiver on complications to report to provider, such as pain, numbness in toes, heavy drainage, and slippage of dressing.  Instructed patient on purpose of compression dressing and on activity and exercise recommendations.      Electronically signed by Sarah Petit RN on 5/1/2025 at 11:10 AM

## 2025-05-06 NOTE — DISCHARGE INSTRUCTIONS
[facility] -Phone: [phone] Fax: [phone]    Visit  Discharge Instructions / Physician Orders     DATE: 5/8/2025     Home Care: [facility]     SUPPLIES ORDERED THRU: Home Care     Wound Location: right lower leg     Cleanse with: keep dry and intact     Dressing Orders: Right Leg: Velcro Compression Wrap on during the day, off at night while in bed                                 Left Leg:  Silvercel, Kerramax, pad area of wound, Calamine Layer, 3 layer compression wrap                                Frequency: Home Care to change on Monday- home health please show patient how to use lymphedema pumps     Additional Orders: Increase protein to diet (meat, cheese, eggs, fish, peanut butter, nuts and beans)  ELEVATE LEGS AS MUCH AS POSSIBLE  Use Lymphedema Pumps as directed  Referral sent to lymphedema clinic [phone]     Your next appointment with Wound Care Center is in 1 week      (Please note your next appointment above and if you are unable to keep, kindly give a 24 hour notice. Thank you.)  If more than 15 min late we cannot guarantee you will be seen due to clinician schedule  Per Policy, Excessive cancellation will call for dismissal from program.     If you experience any of the following, please call the Wound Care Center during business hours:  [phone]     * Increase in Pain  * Temperature over 101  * Increase in drainage from your wound  * Drainage with a foul odor  * Bleeding  * Increase in swelling  * Need for compression bandage changes due to slippage, breakthrough drainage.     If you need medical attention outside of the business hours of the Wound Care Centers please contact your PCP or go to the an urgent care or emergency department     The information contained in the After Visit Summary has been reviewed with me, the patient and/or responsible adult, by my health care provider(s). I had the opportunity to ask questions regarding this information. I have

## 2025-05-08 ENCOUNTER — HOSPITAL ENCOUNTER (OUTPATIENT)
Dept: WOUND CARE | Age: 80
Discharge: HOME | End: 2025-05-08
Payer: MEDICARE

## 2025-05-08 VITALS
DIASTOLIC BLOOD PRESSURE: 55 MMHG | RESPIRATION RATE: 18 BRPM | SYSTOLIC BLOOD PRESSURE: 118 MMHG | HEART RATE: 55 BPM | TEMPERATURE: 96.5 F

## 2025-05-08 DIAGNOSIS — E66.813 CLASS 3 SEVERE OBESITY DUE TO EXCESS CALORIES WITH SERIOUS COMORBIDITY AND BODY MASS INDEX (BMI) OF 45.0 TO 49.9 IN ADULT (HCC): Primary | ICD-10-CM

## 2025-05-08 DIAGNOSIS — I89.0 LYMPHEDEMA OF BOTH LOWER EXTREMITIES: ICD-10-CM

## 2025-05-08 DIAGNOSIS — S81.802D LEG WOUND, LEFT, SUBSEQUENT ENCOUNTER: ICD-10-CM

## 2025-05-08 DIAGNOSIS — E11.65 TYPE 2 DIABETES MELLITUS WITH HYPERGLYCEMIA, WITHOUT LONG-TERM CURRENT USE OF INSULIN (HCC): ICD-10-CM

## 2025-05-08 DIAGNOSIS — I87.2 VENOUS STASIS DERMATITIS OF BOTH LOWER EXTREMITIES: ICD-10-CM

## 2025-05-08 PROCEDURE — 11042 DBRDMT SUBQ TIS 1ST 20SQCM/<: CPT

## 2025-05-08 RX ORDER — LIDOCAINE HYDROCHLORIDE 20 MG/ML
JELLY TOPICAL ONCE
Status: COMPLETED | OUTPATIENT
Start: 2025-05-08 | End: 2025-05-08

## 2025-05-08 RX ORDER — LIDOCAINE HYDROCHLORIDE 20 MG/ML
JELLY TOPICAL ONCE
OUTPATIENT
Start: 2025-05-08 | End: 2025-05-08

## 2025-05-08 RX ORDER — LIDOCAINE HYDROCHLORIDE 40 MG/ML
SOLUTION TOPICAL ONCE
OUTPATIENT
Start: 2025-05-08 | End: 2025-05-08

## 2025-05-08 RX ADMIN — LIDOCAINE HYDROCHLORIDE 6 ML: 20 JELLY TOPICAL at 10:05

## 2025-05-08 NOTE — PROGRESS NOTES
Multilayer Compression Wrap   (Not Unna) Below the Knee    NAME:  Theresa Crow  YOB: 1945  MEDICAL RECORD NUMBER:  [id number]  DATE:  5/8/2025       [x] Removed old Multilayer wrap if indicated and wash leg with mild soap/water.  [x] Applied moisturizing agent to dry skin as needed.   [x] Applied primary and secondary dressing as ordered   [x] Applied multilayered dressing below the knee to Left lower leg(s).    [x] Instructed patient/caregiver not to remove dressing and to keep it clean and dry.   [x] Instructed patient/caregiver on complications to report to provider, such as pain, numbness in toes, heavy drainage, and slippage of dressing.   [x] Instructed patient on purpose of compression dressing and on activity and exercise recommendations.    Electronically signed by JHONY HUSTON RN on 5/8/2025 at 10:51 AM   
place, and time.               Assessment:        Problem List Items Addressed This Visit       Type 2 diabetes mellitus with hyperglycemia (HCC)    Relevant Orders    Initiate Outpatient Wound Care Protocol    Class 3 severe obesity due to excess calories with serious comorbidity and body mass index (BMI) of 45.0 to 49.9 in adult (HCC) - Primary    Relevant Orders    Initiate Outpatient Wound Care Protocol    Lymphedema of both lower extremities    Relevant Orders    Initiate Outpatient Wound Care Protocol    Venous stasis dermatitis of both lower extremities    Relevant Orders    Initiate Outpatient Wound Care Protocol    Leg wound, left, subsequent encounter    Relevant Orders    Initiate Outpatient Wound Care Protocol       Procedure Note  Indications:  Based on my examination of this patient's wound(s)/ulcer(s) today, debridement is required to promote healing and evaluate the wound base.    Performed by: Kori Carlson MD    Consent obtained:  Yes    Time out taken:  Yes    Pain Control: Anesthetic  Anesthetic: 2% Lidocaine Gel Topical       Debridement: Excisional Debridement    Using curette the wound(s)/ulcer(s) was/were debrided down through and including the removal of dermis and subcutaneous tissue.        Devitalized Tissue Debrided:  fibrin, biofilm, and slough    Pre Debridement Measurements:  Are located in the Wound/Ulcer Documentation Flow Sheet    Incision 06/09/14 Leg Left (Active)   Number of days: 3985       Wound 04/17/25 Pretibial Left;Proximal #3 Cluster (Active)   Wound Image   05/08/25 0952   Wound Etiology Venous 05/01/25 1051   Dressing Status Old drainage noted;New drainage noted 05/01/25 1051   Wound Cleansed Soap and water 05/01/25 1051   Dressing/Treatment Other (comment) 04/17/25 1426   Wound Length (cm) 0 cm 05/08/25 0952   Wound Width (cm) 0 cm 05/08/25 0952   Wound Depth (cm) 0 cm 05/08/25 0952   Wound Surface Area (cm^2) 0 cm^2 05/08/25 0952   Change in Wound Size % (l*w) 100

## 2025-05-13 NOTE — DISCHARGE INSTRUCTIONS
[facility] -Phone: [phone] Fax: [phone]    Visit  Discharge Instructions / Physician Orders     DATE: 5/15/2025     Home Care: [facility]     SUPPLIES ORDERED THRU: Home Care     Wound Location: bilateral lower legs-healed     Cleanse with: as per usual     Dressing Orders: Right and Left  Leg: Velcro Compression Wraps (tubi/ace wrap on left lower leg today in clinic)                                   Frequency:on during the day, off at night while in bed    Additional Orders: Increase protein to diet (meat, cheese, eggs, fish, peanut butter, nuts and beans)  ELEVATE LEGS AS MUCH AS POSSIBLE    Use Lymphedema Pumps as directed- EVERDAY  Referral sent to lymphedema clinic [phone]     Your next appointment with Wound Care Center is in 1 week      (Please note your next appointment above and if you are unable to keep, kindly give a 24 hour notice. Thank you.)  If more than 15 min late we cannot guarantee you will be seen due to clinician schedule  Per Policy, Excessive cancellation will call for dismissal from program.     If you experience any of the following, please call the Wound Care Center during business hours:  [phone]     * Increase in Pain  * Temperature over 101  * Increase in drainage from your wound  * Drainage with a foul odor  * Bleeding  * Increase in swelling  * Need for compression bandage changes due to slippage, breakthrough drainage.     If you need medical attention outside of the business hours of the Wound Care Centers please contact your PCP or go to the an urgent care or emergency department     The information contained in the After Visit Summary has been reviewed with me, the patient and/or responsible adult, by my health care provider(s). I had the opportunity to ask questions regarding this information. I have elected to receive;      []After Visit Summary  [x]Comprehensive Discharge Instruction           Patient

## 2025-05-15 ENCOUNTER — HOSPITAL ENCOUNTER (OUTPATIENT)
Dept: WOUND CARE | Age: 80
Discharge: HOME | End: 2025-05-15
Payer: MEDICARE

## 2025-05-15 VITALS — HEART RATE: 55 BPM | SYSTOLIC BLOOD PRESSURE: 130 MMHG | TEMPERATURE: 97.4 F | DIASTOLIC BLOOD PRESSURE: 54 MMHG

## 2025-05-15 DIAGNOSIS — E11.65 TYPE 2 DIABETES MELLITUS WITH HYPERGLYCEMIA, WITHOUT LONG-TERM CURRENT USE OF INSULIN (HCC): ICD-10-CM

## 2025-05-15 DIAGNOSIS — S81.801D WOUND OF RIGHT LOWER EXTREMITY, SUBSEQUENT ENCOUNTER: ICD-10-CM

## 2025-05-15 DIAGNOSIS — E66.813 CLASS 3 SEVERE OBESITY DUE TO EXCESS CALORIES WITH SERIOUS COMORBIDITY AND BODY MASS INDEX (BMI) OF 45.0 TO 49.9 IN ADULT (HCC): Primary | ICD-10-CM

## 2025-05-15 DIAGNOSIS — S81.802D LEG WOUND, LEFT, SUBSEQUENT ENCOUNTER: ICD-10-CM

## 2025-05-15 DIAGNOSIS — I87.2 VENOUS STASIS DERMATITIS OF BOTH LOWER EXTREMITIES: ICD-10-CM

## 2025-05-15 DIAGNOSIS — I89.0 LYMPHEDEMA OF BOTH LOWER EXTREMITIES: ICD-10-CM

## 2025-05-15 PROCEDURE — 99212 OFFICE O/P EST SF 10 MIN: CPT

## 2025-05-15 RX ORDER — LIDOCAINE HYDROCHLORIDE 40 MG/ML
SOLUTION TOPICAL ONCE
OUTPATIENT
Start: 2025-05-15 | End: 2025-05-15

## 2025-05-15 RX ORDER — LIDOCAINE HYDROCHLORIDE 20 MG/ML
JELLY TOPICAL ONCE
Status: DISCONTINUED | OUTPATIENT
Start: 2025-05-15 | End: 2025-05-16 | Stop reason: HOSPADM

## 2025-05-15 RX ORDER — LIDOCAINE HYDROCHLORIDE 20 MG/ML
JELLY TOPICAL ONCE
OUTPATIENT
Start: 2025-05-15 | End: 2025-05-15

## 2025-05-15 NOTE — PROGRESS NOTES
[facility]   Progress Note and Procedure Note      Theresa Crow  MEDICAL RECORD NUMBER:  [id number]  AGE: 80 y.o.   GENDER: female  : 1945  EPISODE DATE:  5/15/2025    Subjective:     Chief Complaint   Patient presents with    Wound Check     Ble           HISTORY of PRESENT ILLNESS HPI     Theresa Crow is a 80 y.o. female who presents today for wound/ulcer evaluation.     History of Wound Context: history of lymphedema and recalcitrant VLU. Just discharged from wound care in late October. She was having a hard time getting compression garments on due to recent gout flare. New blistered and ulcerated areas to BLE today.     Interval history: still not using lymphedema pump as prescribed. Had right leg wrapped last week and the left leg was using her compression garment.  Home care nursing had accidentally caused a small ulcer to left leg when removing the previous compression wrap.    Wound/Ulcer Pain Timing/Severity: mild  Quality of pain: burning, tender  Severity:  2 / 10           PAST MEDICAL HISTORY        Diagnosis Date    Allergic rhinitis     Arthritis     Generalized    Borderline diabetes     Caffeine use     2 coffee day    COPD (chronic obstructive pulmonary disease) (HCC)     EARLY STAGE    GERD (gastroesophageal reflux disease)     Kidney stones     Lymphedema     Mumps     MVP (mitral valve prolapse)     Nausea & vomiting     Palpitations     PONV (postoperative nausea and vomiting)     Ringing in ears     Schamberg's disease     Thoracic outlet syndrome     Type II or unspecified type diabetes mellitus without mention of complication, not stated as uncontrolled     Borderline, no meds    Under care of team 2021    PCP: Janette Uribe, last visit 2021    Under care of team 2021    Cardiologist: Janette Melchor, last visit 2021    Under care of team 2021    Urology: Dr. Turner, last visit 2021    Under

## (undated) DEVICE — DRAPE,REIN 53X77,STERILE: Brand: MEDLINE

## (undated) DEVICE — LIEBERMAN INTRODUCER: Brand: LIEBERMAN

## (undated) DEVICE — STRIP,CLOSURE,WOUND,MEDI-STRIP,1/2X4: Brand: MEDLINE

## (undated) DEVICE — GUIDEWIRE URO L150CM DIA0.035IN STIFF NIT HYDRPHLC STR TIP

## (undated) DEVICE — SINGLE ACTION PUMPING SYSTEM

## (undated) DEVICE — DILATOR URET 12-6FR L70CM HYDR+ 1 STP TAPR NOTTINGHAM

## (undated) DEVICE — FIBER LSR DIA200UM FLEXSHIELD COAT HOLM HI PWR TRAC TIP

## (undated) DEVICE — ADAPTER URO SCP UROLOK LL

## (undated) DEVICE — SOLUTION SCRB 4OZ 4% CHG H2O AIDED FOR PREOPERATIVE SKIN

## (undated) DEVICE — 3M™ STERI-STRIP™ COMPOUND BENZOIN TINCTURE 40 BAGS/CARTON 4 CARTONS/CASE C1544: Brand: 3M™ STERI-STRIP™

## (undated) DEVICE — PACK PROCEDURE SURG CYSTO SVMMC LF